# Patient Record
Sex: FEMALE | Race: WHITE | NOT HISPANIC OR LATINO | Employment: FULL TIME | URBAN - METROPOLITAN AREA
[De-identification: names, ages, dates, MRNs, and addresses within clinical notes are randomized per-mention and may not be internally consistent; named-entity substitution may affect disease eponyms.]

---

## 2017-03-02 DIAGNOSIS — I10 ESSENTIAL HYPERTENSION: Primary | ICD-10-CM

## 2017-03-02 DIAGNOSIS — R80.9 PROTEINURIA: ICD-10-CM

## 2017-03-02 DIAGNOSIS — N27.0 UNILATERAL SMALL KIDNEY: ICD-10-CM

## 2017-03-02 RX ORDER — AMLODIPINE BESYLATE 10 MG/1
10 TABLET ORAL DAILY
Qty: 30 TABLET | Refills: 6 | Status: SHIPPED | OUTPATIENT
Start: 2017-03-02 | End: 2018-05-15

## 2017-06-27 ENCOUNTER — TELEPHONE (OUTPATIENT)
Dept: NEPHROLOGY | Facility: CLINIC | Age: 22
End: 2017-06-27

## 2017-06-27 NOTE — TELEPHONE ENCOUNTER
Hello, this is Ruben's office from Medicine Specialty on the 3rd floor at [facility] located at [address] [place] [address]. We are reminding you of your upcoming Nephrology appointment on 7/11/2017 at 3:30 pm. Please arrive an hour prior to your appointment time for labs. Also, please bring an updated medication list including dose of prescribed and over the counter medications you are currently taking or your labeled medication bottles with you to your appointment. If you have any questions or would like to cancel or reschedule your appointment, please call us at [phone].     If you are having labs draw same day you need a lab appointment scheduled 1 hour prior to your visit.    You are welcome to have your labs done 2-7 days before your appointment at any [facility] or [facility] facility. If you have your labs completed before your appointment, please still come 30 minutes early for check-in.     Thank-You for allowing us to be a member of your Health Care Team,     Camilla Howard., CMA

## 2017-07-10 DIAGNOSIS — R80.9 PROTEINURIA: Primary | ICD-10-CM

## 2017-07-10 NOTE — NURSING NOTE
Labs per clinic 2A protocol.  Proteinuria  Last OV: peds to adult transition  Adali Méndez LPN  Nephrology  [facility]  [phone]

## 2017-07-11 ENCOUNTER — OFFICE VISIT (OUTPATIENT)
Dept: NEPHROLOGY | Facility: CLINIC | Age: 22
End: 2017-07-11
Attending: INTERNAL MEDICINE
Payer: COMMERCIAL

## 2017-07-11 VITALS
BODY MASS INDEX: 19.49 KG/M2 | HEIGHT: 67 IN | OXYGEN SATURATION: 97 % | DIASTOLIC BLOOD PRESSURE: 71 MMHG | WEIGHT: 124.2 LBS | SYSTOLIC BLOOD PRESSURE: 109 MMHG | HEART RATE: 77 BPM

## 2017-07-11 DIAGNOSIS — Q60.5 HYPOPLASTIC KIDNEY: Primary | ICD-10-CM

## 2017-07-11 DIAGNOSIS — R80.9 PROTEINURIA: ICD-10-CM

## 2017-07-11 DIAGNOSIS — I15.9 SECONDARY HYPERTENSION: ICD-10-CM

## 2017-07-11 LAB
ALBUMIN SERPL-MCNC: 4.1 G/DL (ref 3.4–5)
ALBUMIN UR-MCNC: NEGATIVE MG/DL
ANION GAP SERPL CALCULATED.3IONS-SCNC: 7 MMOL/L (ref 3–14)
APPEARANCE UR: CLEAR
BACTERIA #/AREA URNS HPF: ABNORMAL /HPF
BILIRUB UR QL STRIP: NEGATIVE
BUN SERPL-MCNC: 14 MG/DL (ref 7–30)
CALCIUM SERPL-MCNC: 8.4 MG/DL (ref 8.5–10.1)
CHLORIDE SERPL-SCNC: 108 MMOL/L (ref 94–109)
CO2 SERPL-SCNC: 24 MMOL/L (ref 20–32)
COLOR UR AUTO: YELLOW
CREAT SERPL-MCNC: 0.98 MG/DL (ref 0.52–1.04)
CREAT UR-MCNC: 123 MG/DL
ERYTHROCYTE [DISTWIDTH] IN BLOOD BY AUTOMATED COUNT: 12 % (ref 10–15)
GFR SERPL CREATININE-BSD FRML MDRD: 71 ML/MIN/1.7M2
GLUCOSE SERPL-MCNC: 102 MG/DL (ref 70–99)
GLUCOSE UR STRIP-MCNC: NEGATIVE MG/DL
HCT VFR BLD AUTO: 40.8 % (ref 35–47)
HGB BLD-MCNC: 13.3 G/DL (ref 11.7–15.7)
HGB UR QL STRIP: NEGATIVE
KETONES UR STRIP-MCNC: NEGATIVE MG/DL
LEUKOCYTE ESTERASE UR QL STRIP: NEGATIVE
MCH RBC QN AUTO: 29.5 PG (ref 26.5–33)
MCHC RBC AUTO-ENTMCNC: 32.6 G/DL (ref 31.5–36.5)
MCV RBC AUTO: 91 FL (ref 78–100)
MICROALBUMIN UR-MCNC: 65 MG/L
MICROALBUMIN/CREAT UR: 53.09 MG/G CR (ref 0–25)
MUCOUS THREADS #/AREA URNS LPF: PRESENT /LPF
NITRATE UR QL: NEGATIVE
PH UR STRIP: 6 PH (ref 5–7)
PHOSPHATE SERPL-MCNC: 2.4 MG/DL (ref 2.5–4.5)
PLATELET # BLD AUTO: 228 10E9/L (ref 150–450)
POTASSIUM SERPL-SCNC: 3.6 MMOL/L (ref 3.4–5.3)
PROT UR-MCNC: 0.13 G/L
PROT/CREAT 24H UR: 0.11 G/G CR (ref 0–0.2)
RBC # BLD AUTO: 4.51 10E12/L (ref 3.8–5.2)
RBC #/AREA URNS AUTO: <1 /HPF (ref 0–2)
SODIUM SERPL-SCNC: 139 MMOL/L (ref 133–144)
SP GR UR STRIP: 1.01 (ref 1–1.03)
SQUAMOUS #/AREA URNS AUTO: 1 /HPF (ref 0–1)
URN SPEC COLLECT METH UR: ABNORMAL
UROBILINOGEN UR STRIP-MCNC: 0 MG/DL (ref 0–2)
WBC # BLD AUTO: 9.5 10E9/L (ref 4–11)
WBC #/AREA URNS AUTO: 1 /HPF (ref 0–2)

## 2017-07-11 PROCEDURE — 99213 OFFICE O/P EST LOW 20 MIN: CPT | Mod: ZF

## 2017-07-11 PROCEDURE — 85027 COMPLETE CBC AUTOMATED: CPT | Performed by: INTERNAL MEDICINE

## 2017-07-11 PROCEDURE — 84156 ASSAY OF PROTEIN URINE: CPT | Performed by: INTERNAL MEDICINE

## 2017-07-11 PROCEDURE — 36415 COLL VENOUS BLD VENIPUNCTURE: CPT | Performed by: INTERNAL MEDICINE

## 2017-07-11 PROCEDURE — 82043 UR ALBUMIN QUANTITATIVE: CPT | Performed by: INTERNAL MEDICINE

## 2017-07-11 PROCEDURE — 80069 RENAL FUNCTION PANEL: CPT | Performed by: INTERNAL MEDICINE

## 2017-07-11 PROCEDURE — 81001 URINALYSIS AUTO W/SCOPE: CPT | Performed by: INTERNAL MEDICINE

## 2017-07-11 ASSESSMENT — PAIN SCALES - GENERAL: PAINLEVEL: NO PAIN (0)

## 2017-07-11 NOTE — LETTER
7/11/2017       RE: Charisma Valencia  [address]  [facility] [address]     Dear Colleague,    Thank you for referring your patient, Charisma Valencia, to the [facility] NEPHROLOGY at [facility]. Please see a copy of my visit note below.    Nephrology Clinic    Charisma Valencia MRN:[id number] YOB: 1995  Date of Service: July 11, 2017   Primary care provider: Edel Daily  Requesting physician: Lorenzo Houston     ASSESSMENT AND RECOMMENDATIONS:   1. 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  Her renal function is mildly reduced but stable with a baseline Cr of ~1 mg/dL with an eGFR of 71 ml/min. Of note, her iohexol in 2012 was 76 ml/min (when Cr was 0.9 mg/dL).  She has minimal albuminuria of 53 mg/g.    -no pressing need to start RAAS blockade  -will consider restarting enalapril in the future should she develop worsening albuminuria  -if enalapril restarted, will have to remain mindful that she is sexually active  -will consider surveillance renal ultrasound in 6-12 months  -RTC in 6 months    2. HTN: Likely related to CKD.  Per her report she has had 24 BP monitoring.  SBP currently at goal of <130.  -will continue amlodipine at 10 mg daily      REASON FOR CONSULT: CKD (hypoplastic/dysplastic right kidney)    HISTORY OF PRESENT ILLNESS:   Charisma Valencia is a 22 year old female who presents for f/u regarding CKD.  She has been previously followed by Dr. Lorenzo Houston in the in the Pediatric Renal Clinic and presents today to establish care in adult nephrology.  She has a history of long-standing asymptomatic non-nephrotic range isolated proteinuria, hypertension and hypoplastic/dysplastic right kidney.  Her last renal ultrasound (May, 2016) revealed a right kidney of 6.3 cm (with little growth by comparison) and left kidney 11.6 cm. She had been on enalapril in the past but was transitioned to amlodipine 10 mg daily.  She does not measure her blood pressure at home.  "Her proteinuria has been minimal despite being off enalapril.  She is not actively trying to become pregnant. Overall, she feels well and is without medical complaints.  She denies gross hematuria, significant edema, dysuria or urinary tract infection. She denies recent fever, headache, dizziness, blurry vision, hearing difficulty, earache, sore throat, cough, shortness of breath, palpitation, abdominal pain, vomiting, abnormal bowel movement, skin rashes, joint pain/swelling or muscle weakness. The remainder of comprehensive review of systems was unremarkable.     PAST MEDICAL HISTORY:  -CKD, stage 2: Due to hypoplastic/dysplastic right kidney  -HTN    PAST SURGICAL HISTORY:  No past surgical history on file.  MEDICATIONS:  Prescription Medications as of 7/16/2017             amLODIPine (NORVASC) 10 MG tablet Take 1 tablet (10 mg) by mouth daily         ALLERGIES:    No Known Allergies  REVIEW OF SYSTEMS:  A comprehensive review of systems was performed and found to be negative except as described here or above.  SOCIAL HISTORY:   Social History     Social History     Marital status: Single     Spouse name: N/A     Number of children: N/A     Years of education: N/A     Occupational History     Not on file.     Social History Main Topics     Smoking status: Never Smoker     Smokeless tobacco: Never Used     Alcohol use Not on file     Drug use: Not on file     Sexual activity: Not on file     Other Topics Concern     Not on file     Social History Narrative     FAMILY MEDICAL HISTORY:   Reviewed and noncontributory.     PHYSICAL EXAM:   /71  Pulse 77  Ht 1.702 m (5' 7\")  Wt 56.3 kg (124 lb 3.2 oz)  SpO2 97%  BMI 19.45 kg/m2  GENERAL APPEARANCE: alert and no distress  EYES: nonicteric  HENT: mouth without ulcers or lesions  NECK: supple, no adenopathy  RESP: lungs clear to auscultation   CV: regular rhythm, normal rate, no rub  ABDOMEN: soft, nontender, normal bowel sounds, no HSM   : No CVA " tenderness  Extremities: no edema  MS: no evidence of inflammation in joints, no muscle tenderness  SKIN: no concerning rash  NEURO: mentation intact and speech normal  PSYCH: affect normal/bright   LABS:   CMP  Recent Labs   Lab Test  07/11/17   1441 05/26/16   1511  08/14/14   0950  06/05/14   0945   NA  139  138  138  141   POTASSIUM  3.6  3.7  3.8  4.0   CHLORIDE  108  106  105  105   CO2  24  25  23  24   ANIONGAP  7  8  10  12   GLC  102*  108*  87  100*   BUN  14  16  19  18   CR  0.98  0.82  0.89  0.86   GFRESTIMATED  71  88  81  86   GFRESTBLACK  86  >90   GFR Calc    >90   GFR Calc    >90   MERCY  8.4*  9.4  9.6  9.7   MAG   --   2.0   --    --    PHOS  2.4*  3.0  2.6  2.9   ALBUMIN  4.1  4.3  4.8  4.5   ALKPHOS   --   80   --    --      CBC  Recent Labs   Lab Test  07/11/17   1441 05/26/16   1511  06/20/13   0930  12/06/12   0855   HGB  13.3  13.7  14.4  14.3   WBC  9.5  8.9  7.7  6.7   RBC  4.51  4.63  4.73  4.65   HCT  40.8  42.2  43.2  42.0   MCV  91  91  91  90   MCH  29.5  29.6  30.4  30.8   MCHC  32.6  32.5  33.3  34.0   RDW  12.0  12.0  12.9  12.1   PLT  228  236  222  220     URINE STUDIES  Recent Labs   Lab Test  07/11/17   1450  05/26/16   1511  08/14/14   0950  06/17/14   1153   COLOR  Yellow  Light Yellow  Straw  Light Yellow   APPEARANCE  Clear  Clear  Clear  Clear   URINEGLC  Negative  Negative  Negative  Negative   URINEBILI  Negative  Negative  Negative  Negative   URINEKETONE  Negative  Negative  Negative  Negative   SG  1.014  1.014  1.007  1.019   UBLD  Negative  Negative  Negative  Negative   URINEPH  6.0  6.0  5.5  6.5   PROTEIN  Negative  Negative  Negative  Negative   NITRITE  Negative  Negative  Negative  Negative   LEUKEST  Negative  Small*  Negative  Negative   RBCU  <1  0  0  1   WBCU  1  <1  1  1     Recent Labs   Lab Test  07/11/17   1450  05/26/16   1511 08/14/14   0950  06/17/14   1153  07/12/13   0850  06/20/13   0930  06/20/13   0615   12   0845  12   0645  10/13/11   1040  09   0730  09   1200   UTPG  0.11  0.07  0.11  0.18  <0.03  0.39*  0.21*  <0.04  0.16  <0.02  0.31*  0.94*  1.88*     PTH  Recent Labs   Lab Test  16   1511   PTHI  21     IRON STUDIES  No lab results found.  IMAGIN2016 Renal U/S:  Right kidney 6.3 cm and left kidney 11.6 cm.      Total time spent was >60 minutes, and more than 50% of face to face time was spent in counseling and/or coordination of care regarding principles of multidisciplinary care, medication management, and chronic kidney disease and HTN education.  Migel Major MD

## 2017-07-11 NOTE — MR AVS SNAPSHOT
After Visit Summary   7/11/2017    Charisma Valencia    MRN: [id number]           Patient Information     Date Of Birth          1995        Visit Information        Provider Department      7/11/2017 3:30 PM Migel Major MD [facility] Nephrology         Follow-ups after your visit        Follow-up notes from your care team     Return in 6 months (on 1/11/2018).      Your next 10 appointments already scheduled     Jan 09, 2018  1:30 PM CST   Lab with  LAB   [facility] ([facility])    [address] [place] [id number] [phone]            Jan 09, 2018  2:30 PM CST   (Arrive by 2:00 PM)   Return Visit with Migel Major MD   [facility] Nephrology ([facility])    [address] [place] [id number] [phone]              Who to contact     If you have questions or need follow up information about today's clinic visit or your schedule please contact [facility] NEPHROLOGY directly at [phone].  Normal or non-critical lab and imaging results will be communicated to you by MyChart, letter or phone within 4 business days after the clinic has received the results. If you do not hear from us within 7 days, please contact the clinic through [email or web address] or phone. If you have a critical or abnormal lab result, we will notify you by phone as soon as possible.  Submit refill requests through [employer] or call your pharmacy and they will forward the refill request to us. Please allow 3 business days for your refill to be completed.          Additional Information About Your Visit        MyChart Information     [employer] gives you secure access to your electronic health record. If you see a primary care provider, you can also send messages to your care team and make appointments. If you have questions, please call your primary care clinic.  If you do not have a primary care provider, please call  "[phone] and they will assist you.        Care EveryWhere ID     This is your Care EveryWhere ID. This could be used by other organizations to access your [facility] medical records  [id number]        Your Vitals Were     Pulse Height Pulse Oximetry BMI (Body Mass Index)          77 1.702 m (5' 7\") 97% 19.45 kg/m2         Blood Pressure from Last 3 Encounters:   07/11/17 109/71   08/04/16 112/77   07/06/16 115/69    Weight from Last 3 Encounters:   07/11/17 56.3 kg (124 lb 3.2 oz)   08/04/16 54.5 kg (120 lb 2.4 oz)   05/26/16 56.5 kg (124 lb 9 oz)              Today, you had the following     No orders found for display       Primary Care Provider Office Phone # Fax #    Edel Daily [phone]       [place] [id number] [address] [address]        Equal Access to Services     ANDREI HILTON : Hadii aad ku hadasho Soomaali, waaxda luqadaha, qaybta kaalmada adeegyada, waxay idiin hayaan grace silva . So [facility] [phone].    ATENCIÓN: Si habla español, tiene a heredia disposición servicios gratuitos de asistencia lingüística. Llame al [phone].    We comply with applicable federal civil rights laws and [place] laws. We do not discriminate on the basis of race, color, national origin, age, disability sex, sexual orientation or gender identity.            Thank you!     Thank you for choosing [facility] NEPHROLOGY  for your care. Our goal is always to provide you with excellent care. Hearing back from our patients is one way we can continue to improve our services. Please take a few minutes to complete the written survey that you may receive in the mail after your visit with us. Thank you!             Your Updated Medication List - Protect others around you: Learn how to safely use, store and throw away your medicines at www.disposemymeds.org.          This list is accurate as of: 7/11/17  4:54 PM.  Always use your most recent med list.                   " Brand Name Dispense Instructions for use Diagnosis    amLODIPine 10 MG tablet    NORVASC    30 tablet    Take 1 tablet (10 mg) by mouth daily    Essential hypertension, Proteinuria, Unilateral small kidney

## 2017-07-11 NOTE — NURSING NOTE
"Chief Complaint   Patient presents with     Consult     Consult for Proteinuria       Initial /71  Pulse 77  Ht 1.702 m (5' 7\")  Wt 56.3 kg (124 lb 3.2 oz)  SpO2 97%  BMI 19.45 kg/m2 Estimated body mass index is 19.45 kg/(m^2) as calculated from the following:    Height as of this encounter: 1.702 m (5' 7\").    Weight as of this encounter: 56.3 kg (124 lb 3.2 oz).  Medication Reconciliation: complete   Silvia Zazueta CMA    "

## 2017-07-16 PROBLEM — Q60.5 HYPOPLASTIC KIDNEY: Status: ACTIVE | Noted: 2017-07-16

## 2017-07-16 PROBLEM — N18.2 HYPERTENSION ASSOCIATED WITH STAGE 2 CHRONIC KIDNEY DISEASE DUE TO TYPE 2 DIABETES MELLITUS (H): Status: ACTIVE | Noted: 2017-07-16

## 2017-07-16 PROBLEM — I15.9 SECONDARY HYPERTENSION: Status: ACTIVE | Noted: 2017-07-16

## 2017-07-17 NOTE — PROGRESS NOTES
Nephrology Clinic    Charisma Valencia MRN:[id number] YOB: 1995  Date of Service: July 11, 2017   Primary care provider: Edel Daily  Requesting physician: Lorenzo Houston     ASSESSMENT AND RECOMMENDATIONS:   1. 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  Her renal function is mildly reduced but stable with a baseline Cr of ~1 mg/dL with an eGFR of 71 ml/min. Of note, her iohexol in 2012 was 76 ml/min (when Cr was 0.9 mg/dL).  She has minimal albuminuria of 53 mg/g.    -no pressing need to start RAAS blockade  -will consider restarting enalapril in the future should she develop worsening albuminuria  -if enalapril restarted, will have to remain mindful that she is sexually active  -will consider surveillance renal ultrasound in 6-12 months  -RTC in 6 months    2. HTN: Likely related to CKD.  Per her report she has had 24 BP monitoring.  SBP currently at goal of <130.  -will continue amlodipine at 10 mg daily      REASON FOR CONSULT: CKD (hypoplastic/dysplastic right kidney)    HISTORY OF PRESENT ILLNESS:   Charisma Valencia is a 22 year old female who presents for f/u regarding CKD.  She has been previously followed by Dr. Lorenzo Houston in the in the Pediatric Renal Clinic and presents today to establish care in adult nephrology.  She has a history of long-standing asymptomatic non-nephrotic range isolated proteinuria, hypertension and hypoplastic/dysplastic right kidney.  Her last renal ultrasound (May, 2016) revealed a right kidney of 6.3 cm (with little growth by comparison) and left kidney 11.6 cm. She had been on enalapril in the past but was transitioned to amlodipine 10 mg daily.  She does not measure her blood pressure at home. Her proteinuria has been minimal despite being off enalapril.  She is not actively trying to become pregnant. Overall, she feels well and is without medical complaints.  She denies gross hematuria, significant edema, dysuria or urinary tract infection. She denies  "recent fever, headache, dizziness, blurry vision, hearing difficulty, earache, sore throat, cough, shortness of breath, palpitation, abdominal pain, vomiting, abnormal bowel movement, skin rashes, joint pain/swelling or muscle weakness. The remainder of comprehensive review of systems was unremarkable.     PAST MEDICAL HISTORY:  -CKD, stage 2: Due to hypoplastic/dysplastic right kidney  -HTN    PAST SURGICAL HISTORY:  No past surgical history on file.  MEDICATIONS:  Prescription Medications as of 7/16/2017             amLODIPine (NORVASC) 10 MG tablet Take 1 tablet (10 mg) by mouth daily         ALLERGIES:    No Known Allergies  REVIEW OF SYSTEMS:  A comprehensive review of systems was performed and found to be negative except as described here or above.  SOCIAL HISTORY:   Social History     Social History     Marital status: Single     Spouse name: N/A     Number of children: N/A     Years of education: N/A     Occupational History     Not on file.     Social History Main Topics     Smoking status: Never Smoker     Smokeless tobacco: Never Used     Alcohol use Not on file     Drug use: Not on file     Sexual activity: Not on file     Other Topics Concern     Not on file     Social History Narrative     FAMILY MEDICAL HISTORY:   Reviewed and noncontributory.     PHYSICAL EXAM:   /71  Pulse 77  Ht 1.702 m (5' 7\")  Wt 56.3 kg (124 lb 3.2 oz)  SpO2 97%  BMI 19.45 kg/m2  GENERAL APPEARANCE: alert and no distress  EYES: nonicteric  HENT: mouth without ulcers or lesions  NECK: supple, no adenopathy  RESP: lungs clear to auscultation   CV: regular rhythm, normal rate, no rub  ABDOMEN: soft, nontender, normal bowel sounds, no HSM   : No CVA tenderness  Extremities: no edema  MS: no evidence of inflammation in joints, no muscle tenderness  SKIN: no concerning rash  NEURO: mentation intact and speech normal  PSYCH: affect normal/bright   LABS:   CMP  Recent Labs   Lab Test  07/11/17   1441  05/26/16   1511  " 08/14/14   0950  06/05/14   0945   NA  139  138  138  141   POTASSIUM  3.6  3.7  3.8  4.0   CHLORIDE  108  106  105  105   CO2  24  25  23  24   ANIONGAP  7  8  10  12   GLC  102*  108*  87  100*   BUN  14  16  19  18   CR  0.98  0.82  0.89  0.86   GFRESTIMATED  71  88  81  86   GFRESTBLACK  86  >90   GFR Calc    >90   GFR Calc    >90   MERCY  8.4*  9.4  9.6  9.7   MAG   --   2.0   --    --    PHOS  2.4*  3.0  2.6  2.9   ALBUMIN  4.1  4.3  4.8  4.5   ALKPHOS   --   80   --    --      CBC  Recent Labs   Lab Test  07/11/17   1441  05/26/16   1511  06/20/13   0930  12/06/12   0855   HGB  13.3  13.7  14.4  14.3   WBC  9.5  8.9  7.7  6.7   RBC  4.51  4.63  4.73  4.65   HCT  40.8  42.2  43.2  42.0   MCV  91  91  91  90   MCH  29.5  29.6  30.4  30.8   MCHC  32.6  32.5  33.3  34.0   RDW  12.0  12.0  12.9  12.1   PLT  228  236  222  220     URINE STUDIES  Recent Labs   Lab Test  07/11/17   1450  05/26/16   1511  08/14/14   0950  06/17/14   1153   COLOR  Yellow  Light Yellow  Straw  Light Yellow   APPEARANCE  Clear  Clear  Clear  Clear   URINEGLC  Negative  Negative  Negative  Negative   URINEBILI  Negative  Negative  Negative  Negative   URINEKETONE  Negative  Negative  Negative  Negative   SG  1.014  1.014  1.007  1.019   UBLD  Negative  Negative  Negative  Negative   URINEPH  6.0  6.0  5.5  6.5   PROTEIN  Negative  Negative  Negative  Negative   NITRITE  Negative  Negative  Negative  Negative   LEUKEST  Negative  Small*  Negative  Negative   RBCU  <1  0  0  1   WBCU  1  <1  1  1     Recent Labs   Lab Test  07/11/17   1450  05/26/16   1511  08/14/14   0950  06/17/14   1153  07/12/13   0850  06/20/13   0930  06/20/13   0615  07/13/12   0845  07/13/12   0645  10/13/11   1040  07/02/09   0730  06/18/09   1200   UTPG  0.11  0.07  0.11  0.18  <0.03  0.39*  0.21*  <0.04  0.16  <0.02  0.31*  0.94*  1.88*     PTH  Recent Labs   Lab Test  05/26/16   1511   PTHI  21     IRON STUDIES  No lab results  found.  IMAGIN2016 Renal U/S:  Right kidney 6.3 cm and left kidney 11.6 cm.      Total time spent was >60 minutes, and more than 50% of face to face time was spent in counseling and/or coordination of care regarding principles of multidisciplinary care, medication management, and chronic kidney disease and HTN education.  Migel Major MD

## 2017-07-29 ENCOUNTER — HEALTH MAINTENANCE LETTER (OUTPATIENT)
Age: 22
End: 2017-07-29

## 2018-01-03 DIAGNOSIS — Q60.5 HYPOPLASTIC KIDNEY: ICD-10-CM

## 2018-01-03 DIAGNOSIS — R80.9 PROTEINURIA: ICD-10-CM

## 2018-01-03 DIAGNOSIS — I15.9 SECONDARY HYPERTENSION: Primary | ICD-10-CM

## 2018-01-10 ENCOUNTER — OFFICE VISIT (OUTPATIENT)
Dept: NEPHROLOGY | Facility: CLINIC | Age: 23
End: 2018-01-10
Attending: INTERNAL MEDICINE
Payer: COMMERCIAL

## 2018-01-10 VITALS
OXYGEN SATURATION: 96 % | HEART RATE: 79 BPM | BODY MASS INDEX: 18.93 KG/M2 | HEIGHT: 67 IN | SYSTOLIC BLOOD PRESSURE: 107 MMHG | WEIGHT: 120.6 LBS | DIASTOLIC BLOOD PRESSURE: 72 MMHG

## 2018-01-10 DIAGNOSIS — R80.8 OTHER PROTEINURIA: ICD-10-CM

## 2018-01-10 DIAGNOSIS — N18.2 CKD (CHRONIC KIDNEY DISEASE) STAGE 2, GFR 60-89 ML/MIN: Primary | ICD-10-CM

## 2018-01-10 DIAGNOSIS — I15.9 SECONDARY HYPERTENSION: ICD-10-CM

## 2018-01-10 DIAGNOSIS — R80.9 PROTEINURIA: ICD-10-CM

## 2018-01-10 DIAGNOSIS — Q60.5 HYPOPLASTIC KIDNEY: ICD-10-CM

## 2018-01-10 LAB
ALBUMIN SERPL-MCNC: 4.3 G/DL (ref 3.4–5)
ALBUMIN UR-MCNC: NEGATIVE MG/DL
ANION GAP SERPL CALCULATED.3IONS-SCNC: 9 MMOL/L (ref 3–14)
APPEARANCE UR: CLEAR
BACTERIA #/AREA URNS HPF: ABNORMAL /HPF
BILIRUB UR QL STRIP: NEGATIVE
BUN SERPL-MCNC: 16 MG/DL (ref 7–30)
CALCIUM SERPL-MCNC: 8.5 MG/DL (ref 8.5–10.1)
CHLORIDE SERPL-SCNC: 106 MMOL/L (ref 94–109)
CO2 SERPL-SCNC: 22 MMOL/L (ref 20–32)
COLOR UR AUTO: YELLOW
CREAT SERPL-MCNC: 0.91 MG/DL (ref 0.52–1.04)
CREAT UR-MCNC: 68 MG/DL
DEPRECATED CALCIDIOL+CALCIFEROL SERPL-MC: 26 UG/L (ref 20–75)
GFR SERPL CREATININE-BSD FRML MDRD: 77 ML/MIN/1.7M2
GLUCOSE SERPL-MCNC: 98 MG/DL (ref 70–99)
GLUCOSE UR STRIP-MCNC: NEGATIVE MG/DL
HGB BLD-MCNC: 13.7 G/DL (ref 11.7–15.7)
HGB UR QL STRIP: NEGATIVE
KETONES UR STRIP-MCNC: NEGATIVE MG/DL
LEUKOCYTE ESTERASE UR QL STRIP: NEGATIVE
MICROALBUMIN UR-MCNC: 34 MG/L
MICROALBUMIN/CREAT UR: 49.19 MG/G CR (ref 0–25)
MUCOUS THREADS #/AREA URNS LPF: PRESENT /LPF
NITRATE UR QL: NEGATIVE
PH UR STRIP: 5 PH (ref 5–7)
PHOSPHATE SERPL-MCNC: 2.6 MG/DL (ref 2.5–4.5)
POTASSIUM SERPL-SCNC: 3.8 MMOL/L (ref 3.4–5.3)
PROT UR-MCNC: 0.08 G/L
PROT/CREAT 24H UR: 0.11 G/G CR (ref 0–0.2)
PTH-INTACT SERPL-MCNC: 28 PG/ML (ref 12–72)
RBC #/AREA URNS AUTO: 2 /HPF (ref 0–2)
SODIUM SERPL-SCNC: 136 MMOL/L (ref 133–144)
SOURCE: ABNORMAL
SP GR UR STRIP: 1.01 (ref 1–1.03)
SQUAMOUS #/AREA URNS AUTO: <1 /HPF (ref 0–1)
UROBILINOGEN UR STRIP-MCNC: 0 MG/DL (ref 0–2)
WBC #/AREA URNS AUTO: 1 /HPF (ref 0–2)

## 2018-01-10 PROCEDURE — 80069 RENAL FUNCTION PANEL: CPT | Performed by: INTERNAL MEDICINE

## 2018-01-10 PROCEDURE — 82043 UR ALBUMIN QUANTITATIVE: CPT | Performed by: INTERNAL MEDICINE

## 2018-01-10 PROCEDURE — 82306 VITAMIN D 25 HYDROXY: CPT | Performed by: INTERNAL MEDICINE

## 2018-01-10 PROCEDURE — 83970 ASSAY OF PARATHORMONE: CPT | Performed by: INTERNAL MEDICINE

## 2018-01-10 PROCEDURE — 81001 URINALYSIS AUTO W/SCOPE: CPT | Performed by: INTERNAL MEDICINE

## 2018-01-10 PROCEDURE — 36415 COLL VENOUS BLD VENIPUNCTURE: CPT | Performed by: INTERNAL MEDICINE

## 2018-01-10 PROCEDURE — 84156 ASSAY OF PROTEIN URINE: CPT | Performed by: INTERNAL MEDICINE

## 2018-01-10 PROCEDURE — G0463 HOSPITAL OUTPT CLINIC VISIT: HCPCS | Mod: ZF

## 2018-01-10 PROCEDURE — 85018 HEMOGLOBIN: CPT | Performed by: INTERNAL MEDICINE

## 2018-01-10 ASSESSMENT — PAIN SCALES - GENERAL: PAINLEVEL: NO PAIN (0)

## 2018-01-10 NOTE — LETTER
1/10/2018      RE: Charisma Valencia  [address]  [place] [facility] [address]       Nephrology Clinic    Charisma Valencia MRN:[id number] YOB: 1995  Date of Service: January 10, 2018    Primary care provider: Edel Daily  Requesting physician: Lorenzo Houston     ASSESSMENT AND RECOMMENDATIONS:   1. 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  Her renal function is mildly reduced but stable with a baseline Cr of ~0.9 mg/dL with an eGFR of 77 ml/min.  Of note, her iohexol in 2012 was 76 ml/min (when Cr was 0.9 mg/dL) suggesting that creatinine-based eGFR is fairly accurate.  She has minimal albuminuria of ~40 mg/g.    -no pressing need to start RAAS blockade  -will consider restarting enalapril in the future should she develop worsening albuminuria  -if enalapril restarted, will have to remain mindful that she is sexually active  -will consider surveillance renal ultrasound in 1-2 years  -otherwise, RTC in 8 months    2. HTN: Likely related to CKD.  Per her report she has had 24 BP monitoring.  SBP currently at goal of <130.  -will continue amlodipine at 10 mg daily      REASON FOR CONSULT: CKD (hypoplastic/dysplastic right kidney)    HISTORY OF PRESENT ILLNESS:   Charisma Valencia is a 22 year old woman who presents for f/u regarding CKD.  She has been previously followed by Dr. Lorenzo Houston in the [facility].  She has a history of long-standing asymptomatic non-nephrotic range isolated proteinuria, hypertension and hypoplastic/dysplastic right kidney.  Her last renal ultrasound (May, 2016) revealed a right kidney of 6.3 cm (with little growth by comparison) and left kidney 11.6 cm. She had been on enalapril in the past but was transitioned to amlodipine 10 mg daily.  She does not measure her blood pressure at home. Her proteinuria has been minimal despite being off enalapril.  She is not actively trying to become pregnant. Overall, she feels well and is without medical complaints.  She  "denies gross hematuria, significant edema, dysuria or urinary tract infection. She denies recent fever, headache, dizziness, blurry vision, hearing difficulty, earache, sore throat, cough, shortness of breath, palpitation, abdominal pain, vomiting, abnormal bowel movement, skin rashes, joint pain/swelling or muscle weakness. The remainder of comprehensive review of systems was unremarkable.  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      PAST MEDICAL HISTORY:  -CKD, stage 2: Due to hypoplastic/dysplastic right kidney  -HTN    PAST SURGICAL HISTORY:  No past surgical history on file.  MEDICATIONS:  Prescription Medications as of 1/10/2018             amLODIPine (NORVASC) 10 MG tablet Take 1 tablet (10 mg) by mouth daily         ALLERGIES:    No Known Allergies  REVIEW OF SYSTEMS:  A comprehensive review of systems was performed and found to be negative except as described here or above.  SOCIAL HISTORY:   Social History     Social History     Marital status: Single     Spouse name: N/A     Number of children: N/A     Years of education: N/A     Occupational History     Not on file.     Social History Main Topics     Smoking status: Never Smoker     Smokeless tobacco: Never Used     Alcohol use Not on file     Drug use: Not on file     Sexual activity: Not on file     Other Topics Concern     Not on file     Social History Narrative     FAMILY MEDICAL HISTORY:   Reviewed and noncontributory.     PHYSICAL EXAM:   /72  Pulse 79  Ht 1.702 m (5' 7\")  Wt 54.7 kg (120 lb 9.6 oz)  SpO2 96%  BMI 18.89 kg/m2  GENERAL APPEARANCE: alert and no distress  EYES: nonicteric  HENT: mouth without ulcers or lesions  NECK: supple, no adenopathy  RESP: lungs clear to auscultation   CV: regular rhythm, normal rate, no rub  ABDOMEN: soft, nontender, normal bowel sounds, no HSM   : No CVA tenderness  Extremities: no edema  MS: no evidence of inflammation in joints, no muscle tenderness  SKIN: " no concerning rash  NEURO: mentation intact and speech normal  PSYCH: affect normal/bright   LABS:   CMP  Recent Labs   Lab Test  01/10/18   1258  07/11/17   1441  05/26/16   1511  08/14/14   0950   NA  136  139  138  138   POTASSIUM  3.8  3.6  3.7  3.8   CHLORIDE  106  108  106  105   CO2  22  24  25  23   ANIONGAP  9  7  8  10   GLC  98  102*  108*  87   BUN  16  14  16  19   CR  0.91  0.98  0.82  0.89   GFRESTIMATED  77  71  88  81   GFRESTBLACK  >90  86  >90   GFR Calc    >90   GFR Calc     MERCY  8.5  8.4*  9.4  9.6   MAG   --    --   2.0   --    PHOS  2.6  2.4*  3.0  2.6   ALBUMIN  4.3  4.1  4.3  4.8   ALKPHOS   --    --   80   --      CBC  Recent Labs   Lab Test  01/10/18   1258  07/11/17   1441  05/26/16   1511  06/20/13   0930  12/06/12   0855   HGB  13.7  13.3  13.7  14.4  14.3   WBC   --   9.5  8.9  7.7  6.7   RBC   --   4.51  4.63  4.73  4.65   HCT   --   40.8  42.2  43.2  42.0   MCV   --   91  91  91  90   MCH   --   29.5  29.6  30.4  30.8   MCHC   --   32.6  32.5  33.3  34.0   RDW   --   12.0  12.0  12.9  12.1   PLT   --   228  236  222  220     URINE STUDIES  Recent Labs   Lab Test  01/10/18   1313  07/11/17   1450  05/26/16   1511  08/14/14   0950   COLOR  Yellow  Yellow  Light Yellow  Straw   APPEARANCE  Clear  Clear  Clear  Clear   URINEGLC  Negative  Negative  Negative  Negative   URINEBILI  Negative  Negative  Negative  Negative   URINEKETONE  Negative  Negative  Negative  Negative   SG  1.009  1.014  1.014  1.007   UBLD  Negative  Negative  Negative  Negative   URINEPH  5.0  6.0  6.0  5.5   PROTEIN  Negative  Negative  Negative  Negative   NITRITE  Negative  Negative  Negative  Negative   LEUKEST  Negative  Negative  Small*  Negative   RBCU  2  <1  0  0   WBCU  1  1  <1  1     Recent Labs   Lab Test  01/10/18   1313  07/11/17   1450  05/26/16   1511  08/14/14   0950  06/17/14   1153  07/12/13   0850  06/20/13   0930  06/20/13   0615  07/13/12   0845  07/13/12   0645   10/13/11   1040   UTPG  0.11  0.11  0.07  0.11  0.18  <0.03  0.39*  0.21*  <0.04  0.16  <0.02  0.31*     PTH  Recent Labs   Lab Test  16   1511   PTHI  21     IRON STUDIES  No lab results found.  IMAGIN2016 Renal U/S:  Right kidney 6.3 cm and left kidney 11.6 cm.      Total time spent was >40 minutes, and more than 50% of face to face time was spent in counseling and/or coordination of care regarding principles of multidisciplinary care, medication management, and chronic kidney disease and HTN education.  Migel Major MD

## 2018-01-10 NOTE — MR AVS SNAPSHOT
After Visit Summary   1/10/2018    Charisma Valencia    MRN: [id number]           Patient Information     Date Of Birth          1995        Visit Information        Provider Department      1/10/2018 2:00 PM Migel Major MD [facility]         Follow-ups after your visit        Follow-up notes from your care team     Return in about 8 months (around 9/10/2018).      Your next 10 appointments already scheduled     Sep 05, 2018  1:00 PM CDT   Lab with  LAB   [facility] ([facility])    [address]  1st Floor  [facility] [id number] [phone]            Sep 05, 2018  2:00 PM CDT   (Arrive by 1:30 PM)   Return Visit with Migel Major MD   [facility] ([facility])    [address] [place] [address]  [address]  [facility] [address] [phone]              Who to contact     If you have questions or need follow up information about today's clinic visit or your schedule please contact [facility] directly at [phone].  Normal or non-critical lab and imaging results will be communicated to you by [employer], letter or phone within 4 business days after the clinic has received the results. If you do not hear from us within 7 days, please contact the clinic through [employer] or phone. If you have a critical or abnormal lab result, we will notify you by phone as soon as possible.  Submit refill requests through [employer] or call your pharmacy and they will forward the refill request to us. Please allow 3 business days for your refill to be completed.          Additional Information About Your Visit        MyChart Information     [employer] gives you secure access to your electronic health record. If you see a primary care provider, you can also send messages to your care team and make appointments. If you have questions, please call your primary care clinic.  If you do not have a primary care provider,  "please call [phone] and they will assist you.        Care EveryWhere ID     This is your Care EveryWhere ID. This could be used by other organizations to access your [facility] medical records  [id number]        Your Vitals Were     Pulse Height Pulse Oximetry BMI (Body Mass Index)          79 1.702 m (5' 7\") 96% 18.89 kg/m2         Blood Pressure from Last 3 Encounters:   01/10/18 107/72   07/11/17 109/71   08/04/16 112/77    Weight from Last 3 Encounters:   01/10/18 54.7 kg (120 lb 9.6 oz)   07/11/17 56.3 kg (124 lb 3.2 oz)   08/04/16 54.5 kg (120 lb 2.4 oz)              Today, you had the following     No orders found for display       Primary Care Provider Office Phone # Fax #    Edel Daily [phone]       [address] [address]        Equal Access to Services     [facility]: Hadii aad ku hadasho Soomaali, waaxda luqadaha, qaybta kaalmada adeegyada, waxay idiin hayritesh silva . So [facility] [phone].    ATENCIÓN: Si habla español, tiene a heredia disposición servicios gratuitos de asistencia lingüística. Llame al [phone].    We comply with applicable federal civil rights laws and [place] laws. We do not discriminate on the basis of race, color, national origin, age, disability, sex, sexual orientation, or gender identity.            Thank you!     Thank you for choosing [facility]  for your care. Our goal is always to provide you with excellent care. Hearing back from our patients is one way we can continue to improve our services. Please take a few minutes to complete the written survey that you may receive in the mail after your visit with us. Thank you!             Your Updated Medication List - Protect others around you: Learn how to safely use, store and throw away your medicines at www.disposemymeds.org.          This list is accurate as of: 1/10/18  3:01 PM.  Always use your most recent med list.       "             Brand Name Dispense Instructions for use Diagnosis    amLODIPine 10 MG tablet    NORVASC    30 tablet    Take 1 tablet (10 mg) by mouth daily    Essential hypertension, Proteinuria, Unilateral small kidney

## 2018-01-10 NOTE — PROGRESS NOTES
Nephrology Clinic    Charisma Valencia MRN:[id number] YOB: 1995  Date of Service: January 10, 2018    Primary care provider: Edel Daily  Requesting physician: Lorenzo Houston     ASSESSMENT AND RECOMMENDATIONS:   1. 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  Her renal function is mildly reduced but stable with a baseline Cr of ~0.9 mg/dL with an eGFR of 77 ml/min.  Of note, her iohexol in 2012 was 76 ml/min (when Cr was 0.9 mg/dL) suggesting that creatinine-based eGFR is fairly accurate.  She has minimal albuminuria of ~40 mg/g.    -no pressing need to start RAAS blockade  -will consider restarting enalapril in the future should she develop worsening albuminuria  -if enalapril restarted, will have to remain mindful that she is sexually active  -will consider surveillance renal ultrasound in 1-2 years  -otherwise, RTC in 8 months    2. HTN: Likely related to CKD.  Per her report she has had 24 BP monitoring.  SBP currently at goal of <130.  -will continue amlodipine at 10 mg daily      REASON FOR CONSULT: CKD (hypoplastic/dysplastic right kidney)    HISTORY OF PRESENT ILLNESS:   Charisma Valencia is a 22 year old woman who presents for f/u regarding CKD.  She has been previously followed by Dr. Lorenzo Houston in the in the [facility].  She has a history of long-standing asymptomatic non-nephrotic range isolated proteinuria, hypertension and hypoplastic/dysplastic right kidney.  Her last renal ultrasound (May, 2016) revealed a right kidney of 6.3 cm (with little growth by comparison) and left kidney 11.6 cm. She had been on enalapril in the past but was transitioned to amlodipine 10 mg daily.  She does not measure her blood pressure at home. Her proteinuria has been minimal despite being off enalapril.  She is not actively trying to become pregnant. Overall, she feels well and is without medical complaints.  She denies gross hematuria, significant edema, dysuria or urinary tract infection.  "She denies recent fever, headache, dizziness, blurry vision, hearing difficulty, earache, sore throat, cough, shortness of breath, palpitation, abdominal pain, vomiting, abnormal bowel movement, skin rashes, joint pain/swelling or muscle weakness. The remainder of comprehensive review of systems was unremarkable.  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      PAST MEDICAL HISTORY:  -CKD, stage 2: Due to hypoplastic/dysplastic right kidney  -HTN    PAST SURGICAL HISTORY:  No past surgical history on file.  MEDICATIONS:  Prescription Medications as of 1/10/2018             amLODIPine (NORVASC) 10 MG tablet Take 1 tablet (10 mg) by mouth daily         ALLERGIES:    No Known Allergies  REVIEW OF SYSTEMS:  A comprehensive review of systems was performed and found to be negative except as described here or above.  SOCIAL HISTORY:   Social History     Social History     Marital status: Single     Spouse name: N/A     Number of children: N/A     Years of education: N/A     Occupational History     Not on file.     Social History Main Topics     Smoking status: Never Smoker     Smokeless tobacco: Never Used     Alcohol use Not on file     Drug use: Not on file     Sexual activity: Not on file     Other Topics Concern     Not on file     Social History Narrative     FAMILY MEDICAL HISTORY:   Reviewed and noncontributory.     PHYSICAL EXAM:   /72  Pulse 79  Ht 1.702 m (5' 7\")  Wt 54.7 kg (120 lb 9.6 oz)  SpO2 96%  BMI 18.89 kg/m2  GENERAL APPEARANCE: alert and no distress  EYES: nonicteric  HENT: mouth without ulcers or lesions  NECK: supple, no adenopathy  RESP: lungs clear to auscultation   CV: regular rhythm, normal rate, no rub  ABDOMEN: soft, nontender, normal bowel sounds, no HSM   : No CVA tenderness  Extremities: no edema  MS: no evidence of inflammation in joints, no muscle tenderness  SKIN: no concerning rash  NEURO: mentation intact and speech normal  PSYCH: affect " normal/bright   LABS:   CMP  Recent Labs   Lab Test  01/10/18   1258  07/11/17   1441 05/26/16   1511 08/14/14   0950   NA  136  139  138  138   POTASSIUM  3.8  3.6  3.7  3.8   CHLORIDE  106  108  106  105   CO2  22  24  25  23   ANIONGAP  9  7  8  10   GLC  98  102*  108*  87   BUN  16  14  16  19   CR  0.91  0.98  0.82  0.89   GFRESTIMATED  77  71  88  81   GFRESTBLACK  >90  86  >90   GFR Calc    >90   GFR Calc     MERCY  8.5  8.4*  9.4  9.6   MAG   --    --   2.0   --    PHOS  2.6  2.4*  3.0  2.6   ALBUMIN  4.3  4.1  4.3  4.8   ALKPHOS   --    --   80   --      CBC  Recent Labs   Lab Test  01/10/18   1258  07/11/17   1441 05/26/16   1511 06/20/13   0930  12/06/12   0855   HGB  13.7  13.3  13.7  14.4  14.3   WBC   --   9.5  8.9  7.7  6.7   RBC   --   4.51  4.63  4.73  4.65   HCT   --   40.8  42.2  43.2  42.0   MCV   --   91  91  91  90   MCH   --   29.5  29.6  30.4  30.8   MCHC   --   32.6  32.5  33.3  34.0   RDW   --   12.0  12.0  12.9  12.1   PLT   --   228  236  222  220     URINE STUDIES  Recent Labs   Lab Test  01/10/18   1313  07/11/17   1450 05/26/16   1511  08/14/14   0950   COLOR  Yellow  Yellow  Light Yellow  Straw   APPEARANCE  Clear  Clear  Clear  Clear   URINEGLC  Negative  Negative  Negative  Negative   URINEBILI  Negative  Negative  Negative  Negative   URINEKETONE  Negative  Negative  Negative  Negative   SG  1.009  1.014  1.014  1.007   UBLD  Negative  Negative  Negative  Negative   URINEPH  5.0  6.0  6.0  5.5   PROTEIN  Negative  Negative  Negative  Negative   NITRITE  Negative  Negative  Negative  Negative   LEUKEST  Negative  Negative  Small*  Negative   RBCU  2  <1  0  0   WBCU  1  1  <1  1     Recent Labs   Lab Test  01/10/18   1313  07/11/17   1450 05/26/16   1511 08/14/14   0950  06/17/14   1153  07/12/13   0850  06/20/13   0930  06/20/13   0615  07/13/12   0845  07/13/12   0645  10/13/11   1040   UTPG  0.11  0.11  0.07  0.11  0.18  <0.03  0.39*  0.21*   <0.04  0.16  <0.02  0.31*     PTH  Recent Labs   Lab Test  16   1511   PTHI  21     IRON STUDIES  No lab results found.  IMAGIN2016 Renal U/S:  Right kidney 6.3 cm and left kidney 11.6 cm.      Total time spent was >40 minutes, and more than 50% of face to face time was spent in counseling and/or coordination of care regarding principles of multidisciplinary care, medication management, and chronic kidney disease and HTN education.  Migel Major MD

## 2018-01-14 PROBLEM — N18.2 CKD (CHRONIC KIDNEY DISEASE) STAGE 2, GFR 60-89 ML/MIN: Status: ACTIVE | Noted: 2018-01-14

## 2018-05-11 ENCOUNTER — MYC REFILL (OUTPATIENT)
Dept: NEPHROLOGY | Facility: CLINIC | Age: 23
End: 2018-05-11

## 2018-05-11 DIAGNOSIS — R80.9 PROTEINURIA: ICD-10-CM

## 2018-05-11 DIAGNOSIS — N27.0 UNILATERAL SMALL KIDNEY: ICD-10-CM

## 2018-05-11 DIAGNOSIS — I10 ESSENTIAL HYPERTENSION: ICD-10-CM

## 2018-05-11 RX ORDER — AMLODIPINE BESYLATE 10 MG/1
10 TABLET ORAL DAILY
Qty: 30 TABLET | Refills: 6 | Status: CANCELLED | OUTPATIENT
Start: 2018-05-11

## 2018-05-15 ENCOUNTER — TELEPHONE (OUTPATIENT)
Dept: NEPHROLOGY | Facility: CLINIC | Age: 23
End: 2018-05-15

## 2018-05-15 DIAGNOSIS — I10 ESSENTIAL HYPERTENSION: ICD-10-CM

## 2018-05-15 DIAGNOSIS — N27.0 UNILATERAL SMALL KIDNEY: ICD-10-CM

## 2018-05-15 RX ORDER — AMLODIPINE BESYLATE 10 MG/1
10 TABLET ORAL DAILY
Qty: 90 TABLET | Refills: 3 | Status: SHIPPED | OUTPATIENT
Start: 2018-05-15 | End: 2019-05-02

## 2018-05-15 NOTE — TELEPHONE ENCOUNTER
Informed patient that refill request for amlodipine was sent.  Adali Méndez LPN  Nephrology  [phone]

## 2018-05-15 NOTE — TELEPHONE ENCOUNTER
[facility] Call [facility]    Phone Message    May a detailed message be left on voicemail: yes    Reason for Call: Other: RX refill:  amLODIPine (NORVASC) 10 MG.  Pt submitted a RX refill on 5/11/18 and original prescriber was Varsha Lindo.  Please let pt know if Dr. Major will refill this med, as pt needs this med.     Action Taken: Other: [facility]

## 2018-05-15 NOTE — TELEPHONE ENCOUNTER
[facility]  Katie Webb RN Fri May 11, 2018 4:33 PM        ----- Message -----   From: Charisma Valencia   Sent: 5/11/2018 4:26 PM   To: [facility] - [facility]  Subject: Medication Renewal Request     Original authorizing provider: MD Charisma Bello would like a refill of the following medications:  amLODIPine (NORVASC) 10 MG tablet [Varsha Lindo MD]    Preferred pharmacy: [address]    Comment:

## 2019-05-02 DIAGNOSIS — N27.0 UNILATERAL SMALL KIDNEY: ICD-10-CM

## 2019-05-02 DIAGNOSIS — I10 ESSENTIAL HYPERTENSION: ICD-10-CM

## 2019-05-02 RX ORDER — AMLODIPINE BESYLATE 10 MG/1
TABLET ORAL
Qty: 90 TABLET | Refills: 3 | Status: SHIPPED | OUTPATIENT
Start: 2019-05-02 | End: 2020-04-27

## 2019-11-05 ENCOUNTER — HEALTH MAINTENANCE LETTER (OUTPATIENT)
Age: 24
End: 2019-11-05

## 2020-04-27 DIAGNOSIS — I10 ESSENTIAL HYPERTENSION: ICD-10-CM

## 2020-04-27 DIAGNOSIS — N27.0 UNILATERAL SMALL KIDNEY: ICD-10-CM

## 2020-04-27 RX ORDER — AMLODIPINE BESYLATE 10 MG/1
10 TABLET ORAL DAILY
Qty: 90 TABLET | Refills: 3 | Status: SHIPPED | OUTPATIENT
Start: 2020-04-27 | End: 2020-07-30

## 2020-06-01 ENCOUNTER — VIRTUAL VISIT (OUTPATIENT)
Dept: NEPHROLOGY | Facility: CLINIC | Age: 25
End: 2020-06-01
Attending: INTERNAL MEDICINE
Payer: COMMERCIAL

## 2020-06-01 DIAGNOSIS — N18.2 CKD (CHRONIC KIDNEY DISEASE) STAGE 2, GFR 60-89 ML/MIN: Primary | ICD-10-CM

## 2020-06-01 ASSESSMENT — PAIN SCALES - GENERAL: PAINLEVEL: NO PAIN (0)

## 2020-06-01 NOTE — PROGRESS NOTES
Nephrology Clinic    Charisma Valencia MRN:[id number] YOB: 1995  Date of Service: June 1, 2020     Primary care provider: Edel Daily  Requesting physician: Lorenzo Houston     ASSESSMENT AND RECOMMENDATIONS:   1. 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  Her renal function has been mildly reduced but stable with a baseline Cr of ~0.9 mg/dL with an eGFR of 77 ml/min though we don't have any recent labs.  Of note, her iohexol in 2012 was 76 ml/min (when Cr was 0.9 mg/dL) suggesting that creatinine-based eGFR is fairly accurate.  She has had minimal albuminuria of ~40 mg/g.    -obtain labs post COVID crisis  -there has been no pressing need to restart RAAS blockade  -will consider restarting enalapril in the future should she develop worsening albuminuria  -if enalapril restarted, will have to remain mindful that she is sexually active  -will consider surveillance renal ultrasound in 1-2 years  -otherwise, RTC in 1 year    2. HTN: Likely related to CKD.  Per her report she has had 24 BP monitoring.  SBP has been at goal of <130 though she has not checked it over the past year.  -check home BP at home on occasion to be sure that BP continues to be under good control  -will continue amlodipine at 10 mg daily      REASON FOR CONSULT: CKD (hypoplastic/dysplastic right kidney)    HISTORY OF PRESENT ILLNESS:   Charisma Valencia is a 24 year old woman who presents for f/u regarding CKD.  She has been previously followed by Dr. Lorenzo Houston in the in [facility].  She has a history of long-standing asymptomatic non-nephrotic range isolated proteinuria, hypertension and hypoplastic/dysplastic right kidney.  Her last renal ultrasound (May, 2016) revealed a right kidney of 6.3 cm (with little growth by comparison) and left kidney 11.6 cm. She had been on enalapril in the past but was transitioned to amlodipine 10 mg daily.  She does not measure her blood pressure at home. Her proteinuria has been  minimal despite being off enalapril.  She is not actively trying to become pregnant. Overall, she feels well and is without medical complaints.  She denies gross hematuria, significant edema, dysuria or urinary tract infection. She denies recent fever, headache, dizziness, blurry vision, hearing difficulty, earache, sore throat, cough, shortness of breath, palpitation, abdominal pain, vomiting, abnormal bowel movement, skin rashes, joint pain/swelling or muscle weakness. The remainder of comprehensive review of systems was unremarkable.  She has graduated and is now a .      PAST MEDICAL HISTORY:  -CKD, stage 2: Due to hypoplastic/dysplastic right kidney  -HTN    PAST SURGICAL HISTORY:  No past surgical history on file.  MEDICATIONS:  Prescription Medications as of 6/1/2020       Rx Number Disp Refills Start End Last Dispensed Date Next Fill Date Owning Pharmacy    amLODIPine (NORVASC) 10 MG tablet  90 tablet 3 4/27/2020    [facility] [address] [place] [facility] [address]    Sig: Take 1 tablet (10 mg) by mouth daily    Class: E-Prescribe    Route: Oral    norgestrel-ethinyl estradiol (LO/OVRAL) 0.3-30 MG-MCG tablet    7/9/2019 7/8/2020   [facility] [address] [place] [facility] [address]    Sig: Take 1 tablet by mouth    Class: Historical    Route: Oral         ALLERGIES:    No Known Allergies  REVIEW OF SYSTEMS:  A comprehensive review of systems was performed and found to be negative except as described here or above.  SOCIAL HISTORY:   Social History     Socioeconomic History     Marital status: Single     Spouse name: Not on file     Number of children: Not on file     Years of education: Not on file     Highest education level: Not on file   Occupational History     Not on file   Social Needs     Financial resource strain: Not on file     Food insecurity     Worry: Not on file     Inability: Not on file     Transportation needs     Medical: Not on file     Non-medical: Not on file   Tobacco  Use     Smoking status: Never Smoker     Smokeless tobacco: Never Used   Substance and Sexual Activity     Alcohol use: Not on file     Drug use: Not on file     Sexual activity: Not on file   Lifestyle     Physical activity     Days per week: Not on file     Minutes per session: Not on file     Stress: Not on file   Relationships     Social connections     Talks on phone: Not on file     Gets together: Not on file     Attends Spiritism service: Not on file     Active member of club or organization: Not on file     Attends meetings of clubs or organizations: Not on file     Relationship status: Not on file     Intimate partner violence     Fear of current or ex partner: Not on file     Emotionally abused: Not on file     Physically abused: Not on file     Forced sexual activity: Not on file   Other Topics Concern     Not on file   Social History Narrative     Not on file     FAMILY MEDICAL HISTORY:   Reviewed and noncontributory.     PHYSICAL EXAM:   There were no vitals taken for this visit.  GENERAL APPEARANCE: alert and no distress  EYES: nonicteric  HENT: mouth without ulcers or lesions  NECK: supple, no adenopathy  RESP: lungs clear to auscultation   CV: regular rhythm, normal rate, no rub  ABDOMEN: soft, nontender, normal bowel sounds, no HSM   : No CVA tenderness  Extremities: no edema  MS: no evidence of inflammation in joints, no muscle tenderness  SKIN: no concerning rash  NEURO: mentation intact and speech normal  PSYCH: affect normal/bright   LABS:   CMP  Recent Labs   Lab Test 01/10/18  1258 07/11/17  1441 05/26/16  1511 08/14/14  0950    139 138 138   POTASSIUM 3.8 3.6 3.7 3.8   CHLORIDE 106 108 106 105   CO2 22 24 25 23   ANIONGAP 9 7 8 10   GLC 98 102* 108* 87   BUN 16 14 16 19   CR 0.91 0.98 0.82 0.89   GFRESTIMATED 77 71 88 81   GFRESTBLACK >90 86 >90   GFR Calc   >90   GFR Calc     MERCY 8.5 8.4* 9.4 9.6   MAG  --   --  2.0  --    PHOS 2.6 2.4* 3.0 2.6  "  ALBUMIN 4.3 4.1 4.3 4.8   ALKPHOS  --   --  80  --      CBC  Recent Labs   Lab Test 01/10/18  1258 17  1441 16  1511 13  0930 12  0855   HGB 13.7 13.3 13.7 14.4 14.3   WBC  --  9.5 8.9 7.7 6.7   RBC  --  4.51 4.63 4.73 4.65   HCT  --  40.8 42.2 43.2 42.0   MCV  --  91 91 91 90   MCH  --  29.5 29.6 30.4 30.8   MCHC  --  32.6 32.5 33.3 34.0   RDW  --  12.0 12.0 12.9 12.1   PLT  --  228 236 222 220     URINE STUDIES  Recent Labs   Lab Test 01/10/18  1313 17  1450 16  1511 14  0950   COLOR Yellow Yellow Light Yellow Straw   APPEARANCE Clear Clear Clear Clear   URINEGLC Negative Negative Negative Negative   URINEBILI Negative Negative Negative Negative   URINEKETONE Negative Negative Negative Negative   SG 1.009 1.014 1.014 1.007   UBLD Negative Negative Negative Negative   URINEPH 5.0 6.0 6.0 5.5   PROTEIN Negative Negative Negative Negative   NITRITE Negative Negative Negative Negative   LEUKEST Negative Negative Small* Negative   RBCU 2 <1 0 0   WBCU 1 1 <1 1     Recent Labs   Lab Test 01/10/18  1313 17  1450 16  1511 14  0950 14  1153 13  0850 13  0930 13  0615 12  0845 12  0645   UTPG 0.11 0.11 0.07  0.11 0.18 <0.03 0.39* 0.21* <0.04 0.16 <0.02     PTH  Recent Labs   Lab Test 01/10/18  1258 16  1511   PTHI 28 21     IRON STUDIES  No lab results found.  IMAGIN2016 Renal U/S:  Right kidney 6.3 cm and left kidney 11.6 cm.        Charisma Valencia is a 24 year old female who is being evaluated via a billable video visit.      The patient has been notified of following:     \"This video visit will be conducted via a call between you and your physician/provider. We have found that certain health care needs can be provided without the need for an in-person physical exam.  This service lets us provide the care you need with a video conversation.  If a prescription is necessary we can send it directly to your pharmacy.  " "If lab work is needed we can place an order for that and you can then stop by our lab to have the test done at a later time.    Video visits are billed at different rates depending on your insurance coverage.  Please reach out to your insurance provider with any questions.    If during the course of the call the physician/provider feels a video visit is not appropriate, you will not be charged for this service.\"    Patient has given verbal consent for Video visit? Yes    How would you like to obtain your AVS? Marty    Patient would like the video invitation sent by: Send to e-mail at: [email or web address]    Will anyone else be joining your video visit? No        Video-Visit Details    Type of service:  Video Visit    Video Start Time: 9:01 AM  Video End Time: 9:51 AM    Originating Location (pt. Location): Home    Distant Location (provider location):  [facility] NEPHROLOGY     Platform used for Video Visit: [facility]    Migel Major MD    Total time spent was >40 minutes, and more than 50% of face to face time was spent in counseling and/or coordination of care regarding principles of multidisciplinary care, medication management, and chronic kidney disease education.  Migel Major MD    "

## 2020-06-01 NOTE — LETTER
6/1/2020       RE: Charisma Valencia  [address]  [facility] [address]     Dear Colleague,    Thank you for referring your patient, Charisma Valencia, to the [facility] NEPHROLOGY at [facility]. Please see a copy of my visit note below.    Nephrology Clinic    Charisma Valencia MRN:[id number] YOB: 1995  Date of Service: June 1, 2020     Primary care provider: Edel Daily  Requesting physician: Lorenzo Houston     ASSESSMENT AND RECOMMENDATIONS:   1. 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  Her renal function has been mildly reduced but stable with a baseline Cr of ~0.9 mg/dL with an eGFR of 77 ml/min though we don't have any recent labs.  Of note, her iohexol in 2012 was 76 ml/min (when Cr was 0.9 mg/dL) suggesting that creatinine-based eGFR is fairly accurate.  She has had minimal albuminuria of ~40 mg/g.    -obtain labs post COVID crisis  -there has been no pressing need to restart RAAS blockade  -will consider restarting enalapril in the future should she develop worsening albuminuria  -if enalapril restarted, will have to remain mindful that she is sexually active  -will consider surveillance renal ultrasound in 1-2 years  -otherwise, RTC in 1 year    2. HTN: Likely related to CKD.  Per her report she has had 24 BP monitoring.  SBP has been at goal of <130 though she has not checked it over the past year.  -check home BP at home on occasion to be sure that BP continues to be under good control  -will continue amlodipine at 10 mg daily      REASON FOR CONSULT: CKD (hypoplastic/dysplastic right kidney)    HISTORY OF PRESENT ILLNESS:   Charisma Valencia is a 24 year old woman who presents for f/u regarding CKD.  She has been previously followed by Dr. Lorenzo Houston in the in the [facility]al [facility].  She has a history of long-standing asymptomatic non-nephrotic range isolated proteinuria, hypertension and hypoplastic/dysplastic right kidney.  Her last renal  ultrasound (May, 2016) revealed a right kidney of 6.3 cm (with little growth by comparison) and left kidney 11.6 cm. She had been on enalapril in the past but was transitioned to amlodipine 10 mg daily.  She does not measure her blood pressure at home. Her proteinuria has been minimal despite being off enalapril.  She is not actively trying to become pregnant. Overall, she feels well and is without medical complaints.  She denies gross hematuria, significant edema, dysuria or urinary tract infection. She denies recent fever, headache, dizziness, blurry vision, hearing difficulty, earache, sore throat, cough, shortness of breath, palpitation, abdominal pain, vomiting, abnormal bowel movement, skin rashes, joint pain/swelling or muscle weakness. The remainder of comprehensive review of systems was unremarkable.  She has graduated and is now a .      PAST MEDICAL HISTORY:  -CKD, stage 2: Due to hypoplastic/dysplastic right kidney  -HTN    PAST SURGICAL HISTORY:  No past surgical history on file.  MEDICATIONS:  Prescription Medications as of 6/1/2020       Rx Number Disp Refills Start End Last Dispensed Date Next Fill Date Owning Pharmacy    amLODIPine (NORVASC) 10 MG tablet  90 tablet 3 4/27/2020    [facility] [address] [place] [facility] [address]    Sig: Take 1 tablet (10 mg) by mouth daily    Class: E-Prescribe    Route: Oral    norgestrel-ethinyl estradiol (LO/OVRAL) 0.3-30 MG-MCG tablet    7/9/2019 7/8/2020   [facility] [address] [place] [facility] [address]    Sig: Take 1 tablet by mouth    Class: Historical    Route: Oral         ALLERGIES:    No Known Allergies  REVIEW OF SYSTEMS:  A comprehensive review of systems was performed and found to be negative except as described here or above.  SOCIAL HISTORY:   Social History     Socioeconomic History     Marital status: Single     Spouse name: Not on file     Number of children: Not on file     Years of education: Not on file     Highest  education level: Not on file   Occupational History     Not on file   Social Needs     Financial resource strain: Not on file     Food insecurity     Worry: Not on file     Inability: Not on file     Transportation needs     Medical: Not on file     Non-medical: Not on file   Tobacco Use     Smoking status: Never Smoker     Smokeless tobacco: Never Used   Substance and Sexual Activity     Alcohol use: Not on file     Drug use: Not on file     Sexual activity: Not on file   Lifestyle     Physical activity     Days per week: Not on file     Minutes per session: Not on file     Stress: Not on file   Relationships     Social connections     Talks on phone: Not on file     Gets together: Not on file     Attends [employer] service: Not on file     Active member of club or organization: Not on file     Attends meetings of clubs or organizations: Not on file     Relationship status: Not on file     Intimate partner violence     Fear of current or ex partner: Not on file     Emotionally abused: Not on file     Physically abused: Not on file     Forced sexual activity: Not on file   Other Topics Concern     Not on file   Social History Narrative     Not on file     FAMILY MEDICAL HISTORY:   Reviewed and noncontributory.     PHYSICAL EXAM:   There were no vitals taken for this visit.  GENERAL APPEARANCE: alert and no distress  EYES: nonicteric  HENT: mouth without ulcers or lesions  NECK: supple, no adenopathy  RESP: lungs clear to auscultation   CV: regular rhythm, normal rate, no rub  ABDOMEN: soft, nontender, normal bowel sounds, no HSM   : No CVA tenderness  Extremities: no edema  MS: no evidence of inflammation in joints, no muscle tenderness  SKIN: no concerning rash  NEURO: mentation intact and speech normal  PSYCH: affect normal/bright   LABS:   CMP  Recent Labs   Lab Test 01/10/18  1258 07/11/17  1441 05/26/16  1511 08/14/14  0950    139 138 138   POTASSIUM 3.8 3.6 3.7 3.8   CHLORIDE 106 108 106 105   CO2 22 24  "25 23   ANIONGAP 9 7 8 10   GLC 98 102* 108* 87   BUN 16 14 16 19   CR 0.91 0.98 0.82 0.89   GFRESTIMATED 77 71 88 81   GFRESTBLACK >90 86 >90   GFR Calc   >90   GFR Calc     MERCY 8.5 8.4* 9.4 9.6   MAG  --   --  2.0  --    PHOS 2.6 2.4* 3.0 2.6   ALBUMIN 4.3 4.1 4.3 4.8   ALKPHOS  --   --  80  --      CBC  Recent Labs   Lab Test 01/10/18  1258 17  1441 16  1511 13  0930 12  0855   HGB 13.7 13.3 13.7 14.4 14.3   WBC  --  9.5 8.9 7.7 6.7   RBC  --  4.51 4.63 4.73 4.65   HCT  --  40.8 42.2 43.2 42.0   MCV  --  91 91 91 90   MCH  --  29.5 29.6 30.4 30.8   MCHC  --  32.6 32.5 33.3 34.0   RDW  --  12.0 12.0 12.9 12.1   PLT  --  228 236 222 220     URINE STUDIES  Recent Labs   Lab Test 01/10/18  1313 17  1450 16  1511 14  0950   COLOR Yellow Yellow Light Yellow Straw   APPEARANCE Clear Clear Clear Clear   URINEGLC Negative Negative Negative Negative   URINEBILI Negative Negative Negative Negative   URINEKETONE Negative Negative Negative Negative   SG 1.009 1.014 1.014 1.007   UBLD Negative Negative Negative Negative   URINEPH 5.0 6.0 6.0 5.5   PROTEIN Negative Negative Negative Negative   NITRITE Negative Negative Negative Negative   LEUKEST Negative Negative Small* Negative   RBCU 2 <1 0 0   WBCU 1 1 <1 1     Recent Labs   Lab Test 01/10/18  1313 17  1450 16  1511 14  0950 14  1153 13  0850 13  0930 13  0615 12  0845 12  0645   UTPG 0.11 0.11 0.07  0.11 0.18 <0.03 0.39* 0.21* <0.04 0.16 <0.02     PTH  Recent Labs   Lab Test 01/10/18  1258 16  1511   PTHI 28 21     IRON STUDIES  No lab results found.  IMAGIN2016 Renal U/S:  Right kidney 6.3 cm and left kidney 11.6 cm.        Charisma Valencia is a 24 year old female who is being evaluated via a billable video visit.      The patient has been notified of following:     \"This video visit will be conducted via a call between you and your " "physician/provider. We have found that certain health care needs can be provided without the need for an in-person physical exam.  This service lets us provide the care you need with a video conversation.  If a prescription is necessary we can send it directly to your pharmacy.  If lab work is needed we can place an order for that and you can then stop by our lab to have the test done at a later time.    Video visits are billed at different rates depending on your insurance coverage.  Please reach out to your insurance provider with any questions.    If during the course of the call the physician/provider feels a video visit is not appropriate, you will not be charged for this service.\"    Patient has given verbal consent for Video visit? Yes    How would you like to obtain your AVS? Marty    Patient would like the video invitation sent by: Send to e-mail at: [email or web address]    Will anyone else be joining your video visit? No        Video-Visit Details    Type of service:  Video Visit    Video Start Time: 9:01 AM  Video End Time: 9:51 AM    Originating Location (pt. Location): Home    Distant Location (provider location):  [facility] NEPHROLOGY     Platform used for Video Visit: [id number]    Migel Major MD    Total time spent was >40 minutes, and more than 50% of face to face time was spent in counseling and/or coordination of care regarding principles of multidisciplinary care, medication management, and chronic kidney disease education.  Migel Major MD      Again, thank you for allowing me to participate in the care of your patient.      Sincerely,    Migel Major MD      "

## 2020-07-27 ENCOUNTER — APPOINTMENT (OUTPATIENT)
Dept: LAB | Facility: OTHER | Age: 25
End: 2020-07-27
Payer: COMMERCIAL

## 2020-07-27 ENCOUNTER — TELEPHONE (OUTPATIENT)
Dept: NEPHROLOGY | Facility: CLINIC | Age: 25
End: 2020-07-27

## 2020-07-27 DIAGNOSIS — N18.2 CKD (CHRONIC KIDNEY DISEASE) STAGE 2, GFR 60-89 ML/MIN: Primary | ICD-10-CM

## 2020-07-27 DIAGNOSIS — I10 ESSENTIAL HYPERTENSION: ICD-10-CM

## 2020-07-27 NOTE — TELEPHONE ENCOUNTER
[facility] Call Center    Phone Message    May a detailed message be left on voicemail: yes     Reason for Call: Other: Patient called in and went into the lab to get her blood drawn and there was no order. She stated that  was going to put in an order and she could do it whenever this summer. Please place the lab order and call the patient so she knows when to schedule the lab appointment. Thank you.     Action Taken: Message routed to:  [facility] ([facility] neph    Travel Screening: Not Applicable

## 2020-07-30 DIAGNOSIS — N18.2 CKD (CHRONIC KIDNEY DISEASE) STAGE 2, GFR 60-89 ML/MIN: ICD-10-CM

## 2020-07-30 DIAGNOSIS — N27.0 UNILATERAL SMALL KIDNEY: ICD-10-CM

## 2020-07-30 DIAGNOSIS — I10 ESSENTIAL HYPERTENSION: ICD-10-CM

## 2020-07-30 LAB
ALBUMIN SERPL-MCNC: 4.2 G/DL (ref 3.4–5)
ANION GAP SERPL CALCULATED.3IONS-SCNC: 6 MMOL/L (ref 3–14)
BUN SERPL-MCNC: 17 MG/DL (ref 7–30)
CALCIUM SERPL-MCNC: 9 MG/DL (ref 8.5–10.1)
CHLORIDE SERPL-SCNC: 108 MMOL/L (ref 94–109)
CO2 SERPL-SCNC: 26 MMOL/L (ref 20–32)
CREAT SERPL-MCNC: 0.93 MG/DL (ref 0.52–1.04)
CREAT UR-MCNC: 45 MG/DL
GFR SERPL CREATININE-BSD FRML MDRD: 86 ML/MIN/{1.73_M2}
GLUCOSE SERPL-MCNC: 98 MG/DL (ref 70–99)
HGB BLD-MCNC: 13.7 G/DL (ref 11.7–15.7)
MICROALBUMIN UR-MCNC: 69 MG/L
MICROALBUMIN/CREAT UR: 154.22 MG/G CR (ref 0–25)
PHOSPHATE SERPL-MCNC: 2.9 MG/DL (ref 2.5–4.5)
POTASSIUM SERPL-SCNC: 4.2 MMOL/L (ref 3.4–5.3)
PROT UR-MCNC: 0.11 G/L
PROT/CREAT 24H UR: 0.24 G/G CR (ref 0–0.2)
SODIUM SERPL-SCNC: 140 MMOL/L (ref 133–144)

## 2020-07-30 PROCEDURE — 84156 ASSAY OF PROTEIN URINE: CPT | Performed by: PHYSICIAN ASSISTANT

## 2020-07-30 PROCEDURE — 85018 HEMOGLOBIN: CPT | Performed by: PHYSICIAN ASSISTANT

## 2020-07-30 PROCEDURE — 80069 RENAL FUNCTION PANEL: CPT | Performed by: PHYSICIAN ASSISTANT

## 2020-07-30 PROCEDURE — 82043 UR ALBUMIN QUANTITATIVE: CPT | Performed by: PHYSICIAN ASSISTANT

## 2020-07-30 PROCEDURE — 36415 COLL VENOUS BLD VENIPUNCTURE: CPT | Performed by: PHYSICIAN ASSISTANT

## 2020-07-30 RX ORDER — AMLODIPINE BESYLATE 10 MG/1
10 TABLET ORAL DAILY
Qty: 90 TABLET | Refills: 3 | Status: SHIPPED | OUTPATIENT
Start: 2020-07-30 | End: 2021-10-04

## 2020-08-06 ENCOUNTER — MYC MEDICAL ADVICE (OUTPATIENT)
Dept: NEPHROLOGY | Facility: CLINIC | Age: 25
End: 2020-08-06

## 2020-11-22 ENCOUNTER — HEALTH MAINTENANCE LETTER (OUTPATIENT)
Age: 25
End: 2020-11-22

## 2021-05-04 DIAGNOSIS — N18.2 CKD (CHRONIC KIDNEY DISEASE) STAGE 2, GFR 60-89 ML/MIN: Primary | ICD-10-CM

## 2021-05-27 ENCOUNTER — RECORDS - HEALTHEAST (OUTPATIENT)
Dept: ADMINISTRATIVE | Facility: CLINIC | Age: 26
End: 2021-05-27

## 2021-06-02 ENCOUNTER — RECORDS - HEALTHEAST (OUTPATIENT)
Dept: ADMINISTRATIVE | Facility: CLINIC | Age: 26
End: 2021-06-02

## 2021-06-02 DIAGNOSIS — N18.2 CKD (CHRONIC KIDNEY DISEASE) STAGE 2, GFR 60-89 ML/MIN: ICD-10-CM

## 2021-06-02 LAB
ALBUMIN SERPL-MCNC: 4.3 G/DL (ref 3.4–5)
ANION GAP SERPL CALCULATED.3IONS-SCNC: 6 MMOL/L (ref 3–14)
BUN SERPL-MCNC: 14 MG/DL (ref 7–30)
CALCIUM SERPL-MCNC: 9 MG/DL (ref 8.5–10.1)
CHLORIDE SERPL-SCNC: 109 MMOL/L (ref 94–109)
CO2 SERPL-SCNC: 27 MMOL/L (ref 20–32)
CREAT SERPL-MCNC: 1.21 MG/DL (ref 0.52–1.04)
CREAT UR-MCNC: 30 MG/DL
GFR SERPL CREATININE-BSD FRML MDRD: 62 ML/MIN/{1.73_M2}
GLUCOSE SERPL-MCNC: 92 MG/DL (ref 70–99)
HGB BLD-MCNC: 15.1 G/DL (ref 11.7–15.7)
MICROALBUMIN UR-MCNC: 89 MG/L
MICROALBUMIN/CREAT UR: 292.46 MG/G CR (ref 0–25)
PHOSPHATE SERPL-MCNC: 3 MG/DL (ref 2.5–4.5)
POTASSIUM SERPL-SCNC: 3.9 MMOL/L (ref 3.4–5.3)
PROT UR-MCNC: 0.12 G/L
PROT/CREAT 24H UR: 0.41 G/G CR (ref 0–0.2)
SODIUM SERPL-SCNC: 142 MMOL/L (ref 133–144)

## 2021-06-02 PROCEDURE — 85018 HEMOGLOBIN: CPT | Performed by: INTERNAL MEDICINE

## 2021-06-02 PROCEDURE — 80069 RENAL FUNCTION PANEL: CPT | Performed by: INTERNAL MEDICINE

## 2021-06-02 PROCEDURE — 83970 ASSAY OF PARATHORMONE: CPT | Performed by: INTERNAL MEDICINE

## 2021-06-02 PROCEDURE — 82306 VITAMIN D 25 HYDROXY: CPT | Performed by: INTERNAL MEDICINE

## 2021-06-02 PROCEDURE — 36415 COLL VENOUS BLD VENIPUNCTURE: CPT | Performed by: INTERNAL MEDICINE

## 2021-06-02 PROCEDURE — 84156 ASSAY OF PROTEIN URINE: CPT | Performed by: INTERNAL MEDICINE

## 2021-06-02 PROCEDURE — 82043 UR ALBUMIN QUANTITATIVE: CPT | Performed by: INTERNAL MEDICINE

## 2021-06-03 LAB — PTH-INTACT SERPL-MCNC: 16 PG/ML (ref 18–80)

## 2021-06-07 ENCOUNTER — VIRTUAL VISIT (OUTPATIENT)
Dept: NEPHROLOGY | Facility: CLINIC | Age: 26
End: 2021-06-07
Attending: INTERNAL MEDICINE
Payer: COMMERCIAL

## 2021-06-07 DIAGNOSIS — N18.2 CKD (CHRONIC KIDNEY DISEASE) STAGE 2, GFR 60-89 ML/MIN: Primary | ICD-10-CM

## 2021-06-07 LAB
DEPRECATED CALCIDIOL+CALCIFEROL SERPL-MC: <70 UG/L (ref 20–75)
VITAMIN D2 SERPL-MCNC: <5 UG/L
VITAMIN D3 SERPL-MCNC: 65 UG/L

## 2021-06-07 PROCEDURE — 99204 OFFICE O/P NEW MOD 45 MIN: CPT | Mod: 95 | Performed by: INTERNAL MEDICINE

## 2021-06-07 SDOH — HEALTH STABILITY: MENTAL HEALTH: HOW OFTEN DO YOU HAVE 6 OR MORE DRINKS ON ONE OCCASION?: NOT ASKED

## 2021-06-07 SDOH — HEALTH STABILITY: MENTAL HEALTH: HOW OFTEN DO YOU HAVE A DRINK CONTAINING ALCOHOL?: MONTHLY OR LESS

## 2021-06-07 SDOH — HEALTH STABILITY: MENTAL HEALTH: HOW MANY STANDARD DRINKS CONTAINING ALCOHOL DO YOU HAVE ON A TYPICAL DAY?: NOT ASKED

## 2021-06-07 NOTE — PROGRESS NOTES
Charisma is a 25 year old who is being evaluated via a billable video visit.      How would you like to obtain your AVS? MyChart  If the video visit is dropped, the invitation should be resent by: Text to cell phone: [phone]  Will anyone else be joining your video visit? No      Video Start Time: 9:00 AM  Video-Visit Details    Type of service:  Video Visit    Video End Time:9:21 AM    Originating Location (pt. Location): Home    Distant Location (provider location):  [facility] NEPHROLOGY [facility]     Platform used for Video Visit: [employer] [place] CMA

## 2021-06-07 NOTE — PROGRESS NOTES
Nephrology Clinic - Video Visit    Charisma Valencia MRN:[id number] YOB: 1995  Date of Service: June 7, 2021      Primary care provider: Edel Daily  Requesting physician: Lorenzo Houston     ASSESSMENT AND RECOMMENDATIONS:   1. 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  Her renal function has been mildly reduced but stable with a baseline Cr of ~0.9 mg/dL with an eGFR of 77 ml/min.  Having said that, her Cr is slightly up from baseline to 1.2 mg/dL in early June, 2021 along with more albuminuria (increased to 292 from 154 mg/g).  Of note, her iohexol in 2012 was 76 ml/min (when Cr was 0.9 mg/dL) suggesting that creatinine-based eGFR is fairly accurate.    -will repeat renal panel and urine albumin in 2 weeks and reassess if ACEi/ARB therapy may be warranted.  Suspect recent change in Cr and albuminuria may be more stress related.   -otherwise, there has been no pressing need to restart RAAS blockade  -will consider restarting enalapril in the future should she develop worsening albuminuria  -if enalapril restarted, will have to remain mindful that she is sexually active  -will consider surveillance renal ultrasound in 1-2 years  -otherwise, RTC in 6 months    2. HTN: Likely related to CKD.  Per her report she has had 24 BP monitoring.  SBP has been at goal of <130 though she has not checked it over the past year.  -check home BP at home on occasion to be sure that BP continues to be under good control  -will continue amlodipine at 10 mg daily    REASON FOR CONSULT: CKD (hypoplastic/dysplastic right kidney)    HISTORY OF PRESENT ILLNESS:   Charisma Valencia is a 26 year old woman who presents for f/u regarding CKD.  She has been previously followed by Dr. Lorenzo Houston in the in the [facility].  She has a history of long-standing asymptomatic non-nephrotic range isolated proteinuria, hypertension and hypoplastic/dysplastic right kidney.  Her last renal ultrasound (May, 2016) revealed a  right kidney of 6.3 cm (with little growth by comparison) and left kidney 11.6 cm. She had been on enalapril in the past but was transitioned to amlodipine 10 mg daily.  She does not measure her blood pressure at home. Her proteinuria has been minimal despite being off enalapril.  She is not actively trying to become pregnant. Overall, she feels well and is without medical complaints.  She denies gross hematuria, significant edema, dysuria or urinary tract infection. She denies recent fever, headache, dizziness, blurry vision, hearing difficulty, earache, sore throat, cough, shortness of breath, palpitation, abdominal pain, vomiting, abnormal bowel movement, skin rashes, joint pain/swelling or muscle weakness. The remainder of comprehensive review of systems was unremarkable.  She has graduated and is now a .      PAST MEDICAL HISTORY:  -CKD, stage 2: Due to hypoplastic/dysplastic right kidney  -HTN    PAST SURGICAL HISTORY:  No past surgical history on file.  MEDICATIONS:  Prescription Medications as of 6/7/2021       Rx Number Disp Refills Start End Last Dispensed Date Next Fill Date Owning Pharmacy    amLODIPine (NORVASC) 10 MG tablet  90 tablet 3 7/30/2020    [facility] [id number] [place] [address]    Sig: Take 1 tablet (10 mg) by mouth daily    Class: E-Prescribe    Route: Oral    norgestrel-ethinyl estradiol (LO/OVRAL) 0.3-30 MG-MCG tablet    7/9/2019 7/8/2020   [facility] [address] [place] [address]    Sig: Take 1 tablet by mouth    Class: Historical    Route: Oral         ALLERGIES:    No Known Allergies  REVIEW OF SYSTEMS:  A comprehensive review of systems was performed and found to be negative except as described here or above.  SOCIAL HISTORY:   Social History     Socioeconomic History     Marital status: Single     Spouse name: Not on file     Number of children: Not on file     Years of education: Not on file     Highest education level: Not on file   Occupational  History     Not on file   Social Needs     Financial resource strain: Not on file     Food insecurity     Worry: Not on file     Inability: Not on file     Transportation needs     Medical: Not on file     Non-medical: Not on file   Tobacco Use     Smoking status: Never Smoker     Smokeless tobacco: Never Used   Substance and Sexual Activity     Alcohol use: Yes     Frequency: Monthly or less     Drug use: Not Currently     Sexual activity: Not on file   Lifestyle     Physical activity     Days per week: Not on file     Minutes per session: Not on file     Stress: Not on file   Relationships     Social connections     Talks on phone: Not on file     Gets together: Not on file     Attends Taoist service: Not on file     Active member of club or organization: Not on file     Attends meetings of clubs or organizations: Not on file     Relationship status: Not on file     Intimate partner violence     Fear of current or ex partner: Not on file     Emotionally abused: Not on file     Physically abused: Not on file     Forced sexual activity: Not on file   Other Topics Concern     Not on file   Social History Narrative     Not on file     FAMILY MEDICAL HISTORY:   Reviewed and noncontributory.     PHYSICAL EXAM:   Physical exam deferred as encounter was a video visit.      LABS:   CMP  Recent Labs   Lab Test 06/02/21  1307 07/30/20  1414 01/10/18  1258 07/11/17  1441 05/26/16  1511    140 136 139 138   POTASSIUM 3.9 4.2 3.8 3.6 3.7   CHLORIDE 109 108 106 108 106   CO2 27 26 22 24 25   ANIONGAP 6 6 9 7 8   GLC 92 98 98 102* 108*   BUN 14 17 16 14 16   CR 1.21* 0.93 0.91 0.98 0.82   GFRESTIMATED 62 86 77 71 88   GFRESTBLACK 72 >90 >90 86 >90   GFR Calc     MERCY 9.0 9.0 8.5 8.4* 9.4   MAG  --   --   --   --  2.0   PHOS 3.0 2.9 2.6 2.4* 3.0   ALBUMIN 4.3 4.2 4.3 4.1 4.3   ALKPHOS  --   --   --   --  80     CBC  Recent Labs   Lab Test 06/02/21  1307 07/30/20  1414 01/10/18  1258 07/11/17  1441  16  1511 13  0930   HGB 15.1 13.7 13.7 13.3 13.7 14.4   WBC  --   --   --  9.5 8.9 7.7   RBC  --   --   --  4.51 4.63 4.73   HCT  --   --   --  40.8 42.2 43.2   MCV  --   --   --  91 91 91   MCH  --   --   --  29.5 29.6 30.4   MCHC  --   --   --  32.6 32.5 33.3   RDW  --   --   --  12.0 12.0 12.9   PLT  --   --   --  228 236 222     URINE STUDIES  Recent Labs   Lab Test 01/10/18  1313 17  1450 16  1511 14  0950   COLOR Yellow Yellow Light Yellow Straw   APPEARANCE Clear Clear Clear Clear   URINEGLC Negative Negative Negative Negative   URINEBILI Negative Negative Negative Negative   URINEKETONE Negative Negative Negative Negative   SG 1.009 1.014 1.014 1.007   UBLD Negative Negative Negative Negative   URINEPH 5.0 6.0 6.0 5.5   PROTEIN Negative Negative Negative Negative   NITRITE Negative Negative Negative Negative   LEUKEST Negative Negative Small* Negative   RBCU 2 <1 0 0   WBCU 1 1 <1 1     Recent Labs   Lab Test 21  1314 20  1421 01/10/18  1313 17  1450 16  1511 14  0950 14  1153 13  0850 13  0930 13  0615   UTPG 0.41* 0.24* 0.11 0.11 0.07  0.11 0.18 <0.03 0.39* 0.21* <0.04     PTH  Recent Labs   Lab Test 21  1307 01/10/18  1258 16  1511   PTHI 16* 28 21     IRON STUDIES  No lab results found.  IMAGIN2016 Renal U/S:  Right kidney 6.3 cm and left kidney 11.6 cm.      All labs and imaging above reviewed by me.    Migel Major MD

## 2021-06-07 NOTE — LETTER
6/7/2021       RE: Charisma Valencia  [address] [address]     Dear Colleague,    Thank you for referring your patient, Charisma Valencia, to the [facility] [place] at [facility]. Please see a copy of my visit note below.    Charisma is a 25 year old who is being evaluated via a billable video visit.      How would you like to obtain your AVS? MyChart  If the video visit is dropped, the invitation should be resent by: Text to cell phone: [phone]  Will anyone else be joining your video visit? No      Video Start Time: 9:00 AM  Video-Visit Details    Type of service:  Video Visit    Video End Time:9:21 AM    Originating Location (pt. Location): Home    Distant Location (provider location):  [facility] [place]     Platform used for Video Visit: [employer] [facility]    Nephrology Clinic - Video Visit    Charisma Valencia MRN:[id number] YOB: 1995  Date of Service: June 7, 2021      Primary care provider: Edel Daily  Requesting physician: Lorenzo Houston     ASSESSMENT AND RECOMMENDATIONS:   1. 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  Her renal function has been mildly reduced but stable with a baseline Cr of ~0.9 mg/dL with an eGFR of 77 ml/min.  Having said that, her Cr is slightly up from baseline to 1.2 mg/dL in early June, 2021 along with more albuminuria (increased to 292 from 154 mg/g).  Of note, her iohexol in 2012 was 76 ml/min (when Cr was 0.9 mg/dL) suggesting that creatinine-based eGFR is fairly accurate.    -will repeat renal panel and urine albumin in 2 weeks and reassess if ACEi/ARB therapy may be warranted.  Suspect recent change in Cr and albuminuria may be more stress related.   -otherwise, there has been no pressing need to restart RAAS blockade  -will consider restarting enalapril in the future should she develop worsening albuminuria  -if enalapril  restarted, will have to remain mindful that she is sexually active  -will consider surveillance renal ultrasound in 1-2 years  -otherwise, RTC in 6 months    2. HTN: Likely related to CKD.  Per her report she has had 24 BP monitoring.  SBP has been at goal of <130 though she has not checked it over the past year.  -check home BP at home on occasion to be sure that BP continues to be under good control  -will continue amlodipine at 10 mg daily    REASON FOR CONSULT: CKD (hypoplastic/dysplastic right kidney)    HISTORY OF PRESENT ILLNESS:   Charisma Valencia is a 26 year old woman who presents for f/u regarding CKD.  She has been previously followed by Dr. Lorenzo Houston in the in the Pediatric [facility]al Clinic.  She has a history of long-standing asymptomatic non-nephrotic range isolated proteinuria, hypertension and hypoplastic/dysplastic right kidney.  Her last renal ultrasound (May, 2016) revealed a right kidney of 6.3 cm (with little growth by comparison) and left kidney 11.6 cm. She had been on enalapril in the past but was transitioned to amlodipine 10 mg daily.  She does not measure her blood pressure at home. Her proteinuria has been minimal despite being off enalapril.  She is not actively trying to become pregnant. Overall, she feels well and is without medical complaints.  She denies gross hematuria, significant edema, dysuria or urinary tract infection. She denies recent fever, headache, dizziness, blurry vision, hearing difficulty, earache, sore throat, cough, shortness of breath, palpitation, abdominal pain, vomiting, abnormal bowel movement, skin rashes, joint pain/swelling or muscle weakness. The remainder of comprehensive review of systems was unremarkable.  She has graduated and is now a .      PAST MEDICAL HISTORY:  -CKD, stage 2: Due to hypoplastic/dysplastic right kidney  -HTN    PAST SURGICAL HISTORY:  No past surgical history on file.  MEDICATIONS:  Prescription Medications as of 6/7/2021        Rx Number Disp Refills Start End Last Dispensed Date Next Fill Date Owning Pharmacy    amLODIPine (NORVASC) 10 MG tablet  90 tablet 3 7/30/2020    [facility] [place] [address]    Sig: Take 1 tablet (10 mg) by mouth daily    Class: E-Prescribe    Route: Oral    norgestrel-ethinyl estradiol (LO/OVRAL) 0.3-30 MG-MCG tablet    7/9/2019 7/8/2020   [facility] [place] [address]    Sig: Take 1 tablet by mouth    Class: Historical    Route: Oral         ALLERGIES:    No Known Allergies  REVIEW OF SYSTEMS:  A comprehensive review of systems was performed and found to be negative except as described here or above.  SOCIAL HISTORY:   Social History     Socioeconomic History     Marital status: Single     Spouse name: Not on file     Number of children: Not on file     Years of education: Not on file     Highest education level: Not on file   Occupational History     Not on file   Social Needs     Financial resource strain: Not on file     Food insecurity     Worry: Not on file     Inability: Not on file     Transportation needs     Medical: Not on file     Non-medical: Not on file   Tobacco Use     Smoking status: Never Smoker     Smokeless tobacco: Never Used   Substance and Sexual Activity     Alcohol use: Yes     Frequency: Monthly or less     Drug use: Not Currently     Sexual activity: Not on file   Lifestyle     Physical activity     Days per week: Not on file     Minutes per session: Not on file     Stress: Not on file   Relationships     Social connections     Talks on phone: Not on file     Gets together: Not on file     Attends Cheondoism service: Not on file     Active member of club or organization: Not on file     Attends meetings of clubs or organizations: Not on file     Relationship status: Not on file     Intimate partner violence     Fear of current or ex partner: Not on file     Emotionally abused: Not on file     Physically abused: Not on file     Forced sexual  activity: Not on file   Other Topics Concern     Not on file   Social History Narrative     Not on file     FAMILY MEDICAL HISTORY:   Reviewed and noncontributory.     PHYSICAL EXAM:   Physical exam deferred as encounter was a video visit.      LABS:   CMP  Recent Labs   Lab Test 06/02/21  1307 07/30/20  1414 01/10/18  1258 07/11/17  1441 05/26/16  1511    140 136 139 138   POTASSIUM 3.9 4.2 3.8 3.6 3.7   CHLORIDE 109 108 106 108 106   CO2 27 26 22 24 25   ANIONGAP 6 6 9 7 8   GLC 92 98 98 102* 108*   BUN 14 17 16 14 16   CR 1.21* 0.93 0.91 0.98 0.82   GFRESTIMATED 62 86 77 71 88   GFRESTBLACK 72 >90 >90 86 >90   GFR Calc     MERCY 9.0 9.0 8.5 8.4* 9.4   MAG  --   --   --   --  2.0   PHOS 3.0 2.9 2.6 2.4* 3.0   ALBUMIN 4.3 4.2 4.3 4.1 4.3   ALKPHOS  --   --   --   --  80     CBC  Recent Labs   Lab Test 06/02/21  1307 07/30/20  1414 01/10/18  1258 07/11/17  1441 05/26/16  1511 06/20/13  0930   HGB 15.1 13.7 13.7 13.3 13.7 14.4   WBC  --   --   --  9.5 8.9 7.7   RBC  --   --   --  4.51 4.63 4.73   HCT  --   --   --  40.8 42.2 43.2   MCV  --   --   --  91 91 91   MCH  --   --   --  29.5 29.6 30.4   MCHC  --   --   --  32.6 32.5 33.3   RDW  --   --   --  12.0 12.0 12.9   PLT  --   --   --  228 236 222     URINE STUDIES  Recent Labs   Lab Test 01/10/18  1313 07/11/17  1450 05/26/16  1511 08/14/14  0950   COLOR Yellow Yellow Light Yellow Straw   APPEARANCE Clear Clear Clear Clear   URINEGLC Negative Negative Negative Negative   URINEBILI Negative Negative Negative Negative   URINEKETONE Negative Negative Negative Negative   SG 1.009 1.014 1.014 1.007   UBLD Negative Negative Negative Negative   URINEPH 5.0 6.0 6.0 5.5   PROTEIN Negative Negative Negative Negative   NITRITE Negative Negative Negative Negative   LEUKEST Negative Negative Small* Negative   RBCU 2 <1 0 0   WBCU 1 1 <1 1     Recent Labs   Lab Test 06/02/21  1314 07/30/20  1421 01/10/18  1313 07/11/17  1450 05/26/16  1511 08/14/14  0950  14  1153 13  0850 13  0930 13  0615   UTPG 0.41* 0.24* 0.11 0.11 0.07  0.11 0.18 <0.03 0.39* 0.21* <0.04     PTH  Recent Labs   Lab Test 21  1307 01/10/18  1258 16  1511   PTHI 16* 28 21     IRON STUDIES  No lab results found.  IMAGIN2016 Renal U/S:  Right kidney 6.3 cm and left kidney 11.6 cm.      All labs and imaging above reviewed by me.    Migel Major MD

## 2021-06-23 DIAGNOSIS — N18.2 CKD (CHRONIC KIDNEY DISEASE) STAGE 2, GFR 60-89 ML/MIN: ICD-10-CM

## 2021-06-23 LAB
ALBUMIN SERPL-MCNC: 4.3 G/DL (ref 3.4–5)
ANION GAP SERPL CALCULATED.3IONS-SCNC: 5 MMOL/L (ref 3–14)
BUN SERPL-MCNC: 17 MG/DL (ref 7–30)
CALCIUM SERPL-MCNC: 9 MG/DL (ref 8.5–10.1)
CHLORIDE SERPL-SCNC: 108 MMOL/L (ref 94–109)
CO2 SERPL-SCNC: 24 MMOL/L (ref 20–32)
CREAT SERPL-MCNC: 1.02 MG/DL (ref 0.52–1.04)
CREAT UR-MCNC: 40 MG/DL
GFR SERPL CREATININE-BSD FRML MDRD: 76 ML/MIN/{1.73_M2}
GLUCOSE SERPL-MCNC: 96 MG/DL (ref 70–99)
MICROALBUMIN UR-MCNC: 55 MG/L
MICROALBUMIN/CREAT UR: 136.16 MG/G CR (ref 0–25)
PHOSPHATE SERPL-MCNC: 2.9 MG/DL (ref 2.5–4.5)
POTASSIUM SERPL-SCNC: 4.2 MMOL/L (ref 3.4–5.3)
SODIUM SERPL-SCNC: 137 MMOL/L (ref 133–144)

## 2021-06-23 PROCEDURE — 82043 UR ALBUMIN QUANTITATIVE: CPT | Performed by: INTERNAL MEDICINE

## 2021-06-23 PROCEDURE — 80069 RENAL FUNCTION PANEL: CPT | Performed by: INTERNAL MEDICINE

## 2021-06-23 PROCEDURE — 36415 COLL VENOUS BLD VENIPUNCTURE: CPT | Performed by: INTERNAL MEDICINE

## 2021-09-19 ENCOUNTER — HEALTH MAINTENANCE LETTER (OUTPATIENT)
Age: 26
End: 2021-09-19

## 2021-10-02 DIAGNOSIS — I10 ESSENTIAL HYPERTENSION: ICD-10-CM

## 2021-10-02 DIAGNOSIS — N27.0 UNILATERAL SMALL KIDNEY: ICD-10-CM

## 2021-10-04 RX ORDER — AMLODIPINE BESYLATE 10 MG/1
TABLET ORAL
Qty: 90 TABLET | Refills: 3 | Status: SHIPPED | OUTPATIENT
Start: 2021-10-04 | End: 2022-09-29

## 2022-01-08 ENCOUNTER — HEALTH MAINTENANCE LETTER (OUTPATIENT)
Age: 27
End: 2022-01-08

## 2022-04-15 ENCOUNTER — DOCUMENTATION ONLY (OUTPATIENT)
Dept: LAB | Facility: OTHER | Age: 27
End: 2022-04-15

## 2022-04-15 ENCOUNTER — TELEPHONE (OUTPATIENT)
Dept: NEPHROLOGY | Facility: CLINIC | Age: 27
End: 2022-04-15
Payer: COMMERCIAL

## 2022-04-15 ENCOUNTER — LAB (OUTPATIENT)
Dept: LAB | Facility: OTHER | Age: 27
End: 2022-04-15
Payer: COMMERCIAL

## 2022-04-15 DIAGNOSIS — N18.2 CKD (CHRONIC KIDNEY DISEASE) STAGE 2, GFR 60-89 ML/MIN: ICD-10-CM

## 2022-04-15 DIAGNOSIS — N18.2 CKD (CHRONIC KIDNEY DISEASE) STAGE 2, GFR 60-89 ML/MIN: Primary | ICD-10-CM

## 2022-04-15 LAB
ALBUMIN SERPL-MCNC: 4.3 G/DL (ref 3.4–5)
ANION GAP SERPL CALCULATED.3IONS-SCNC: 5 MMOL/L (ref 3–14)
BUN SERPL-MCNC: 13 MG/DL (ref 7–30)
CALCIUM SERPL-MCNC: 9 MG/DL (ref 8.5–10.1)
CHLORIDE BLD-SCNC: 109 MMOL/L (ref 94–109)
CO2 SERPL-SCNC: 26 MMOL/L (ref 20–32)
CREAT SERPL-MCNC: 0.87 MG/DL (ref 0.52–1.04)
CREAT UR-MCNC: 72 MG/DL
CREAT UR-MCNC: 72 MG/DL
GFR SERPL CREATININE-BSD FRML MDRD: >90 ML/MIN/1.73M2
GLUCOSE BLD-MCNC: 104 MG/DL (ref 70–99)
HGB BLD-MCNC: 14.6 G/DL (ref 11.7–15.7)
MICROALBUMIN UR-MCNC: 699 MG/L
MICROALBUMIN/CREAT UR: 970.83 MG/G CR (ref 0–25)
PHOSPHATE SERPL-MCNC: 1.9 MG/DL (ref 2.5–4.5)
POTASSIUM BLD-SCNC: 3.8 MMOL/L (ref 3.4–5.3)
PROT UR-MCNC: 0.8 G/L
PROT/CREAT 24H UR: 1.11 G/G CR (ref 0–0.2)
PTH-INTACT SERPL-MCNC: 18 PG/ML (ref 18–80)
SODIUM SERPL-SCNC: 140 MMOL/L (ref 133–144)

## 2022-04-15 PROCEDURE — 82306 VITAMIN D 25 HYDROXY: CPT

## 2022-04-15 PROCEDURE — 85018 HEMOGLOBIN: CPT

## 2022-04-15 PROCEDURE — 36415 COLL VENOUS BLD VENIPUNCTURE: CPT

## 2022-04-15 PROCEDURE — 84156 ASSAY OF PROTEIN URINE: CPT

## 2022-04-15 PROCEDURE — 82043 UR ALBUMIN QUANTITATIVE: CPT

## 2022-04-15 PROCEDURE — 80069 RENAL FUNCTION PANEL: CPT

## 2022-04-15 PROCEDURE — 83970 ASSAY OF PARATHORMONE: CPT

## 2022-04-15 NOTE — PROGRESS NOTES
Patient is coming in today for labs   Please place orders if needed   Thank you   Vita ABREU) [facility]

## 2022-04-15 NOTE — TELEPHONE ENCOUNTER
[facility] Call Center    Phone Message    May a detailed message be left on voicemail: yes     Reason for Call: Order(s): Other:   Reason for requested: Follow up CKD labs  Date needed: 4/15/22- pt already had lab draw this AM  Provider name: Dr. Major      Action Taken: Message routed to:  [facility] [place] [facility]    Travel Screening: Not Applicable

## 2022-04-19 LAB
DEPRECATED CALCIDIOL+CALCIFEROL SERPL-MC: <67 UG/L (ref 20–75)
VITAMIN D2 SERPL-MCNC: <5 UG/L
VITAMIN D3 SERPL-MCNC: 62 UG/L

## 2022-04-25 ENCOUNTER — VIRTUAL VISIT (OUTPATIENT)
Dept: NEPHROLOGY | Facility: CLINIC | Age: 27
End: 2022-04-25
Attending: INTERNAL MEDICINE
Payer: COMMERCIAL

## 2022-04-25 DIAGNOSIS — N18.2 CKD (CHRONIC KIDNEY DISEASE) STAGE 2, GFR 60-89 ML/MIN: ICD-10-CM

## 2022-04-25 DIAGNOSIS — R80.9 ALBUMINURIA: ICD-10-CM

## 2022-04-25 DIAGNOSIS — I10 ESSENTIAL HYPERTENSION: ICD-10-CM

## 2022-04-25 DIAGNOSIS — N27.0 UNILATERAL SMALL KIDNEY: ICD-10-CM

## 2022-04-25 DIAGNOSIS — Z76.89 ENCOUNTER PRIOR TO INITIATION OF MEDICATION: Primary | ICD-10-CM

## 2022-04-25 PROCEDURE — 99214 OFFICE O/P EST MOD 30 MIN: CPT | Mod: 95 | Performed by: INTERNAL MEDICINE

## 2022-04-25 RX ORDER — ENALAPRIL MALEATE 10 MG/1
10 TABLET ORAL DAILY
Qty: 30 TABLET | Refills: 11 | Status: SHIPPED | OUTPATIENT
Start: 2022-04-25 | End: 2022-10-11

## 2022-04-25 NOTE — PROGRESS NOTES
Charisma is a 26 year old who is being evaluated via a billable video visit.      How would you like to obtain your AVS? MyChart  If the video visit is dropped, the invitation should be resent by: Send to e-mail at: [email or web address]  Will anyone else be joining your video visit? No      Video Start Time: 10:30 AM  Video-Visit Details    Type of service:  Video Visit    Video End Time:10:59 AM    Originating Location (pt. Location): Home    Distant Location (provider location):  [facility] [place]     Platform used for Video Visit: [employer]

## 2022-04-25 NOTE — PROGRESS NOTES
Nephrology Clinic - Video Visit    Charisma Valencia MRN:[id number] YOB: 1995  Date of Service: April 25, 2022      Primary care provider: Edel Daily  Requesting physician: Lorenzo Houston     ASSESSMENT AND RECOMMENDATIONS:   1. 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  Her renal function has been mildly reduced but stable with a baseline Cr of ~0.9 mg/dL with an eGFR of 77 ml/min.  Of note, her iohexol in 2012 was 76 ml/min (when Cr was 0.9 mg/dL) suggesting that creatinine-based eGFR is fairly accurate.  Given her worsening albuminuria (136 to 960 mg/g) we will restart her on RAAS blockade.     -will discontinue amlodipine and restart enalapril at 10 mg daily   -we will have to remain mindful that she is sexually active on birth control.  She was counseled on teratogenic complications of enalapril and will notify if she plans on becoming pregnant.  She agreed to undergo HCG testing.  -will obtain repeat renal panel and HCG within 1-2 weeks of starting enalapril      -will consider surveillance renal ultrasound in 1-2 years  -otherwise, RTC in 4 months    2. HTN: Likely related to CKD.  Per her report she has had 24 BP monitoring.  SBP has been at goal of <130 though she has not checked it over the past year.  -check home BP at home on occasion to be sure that BP continues to be under good control  -will discontinue amlodipine at 10 mg daily and start enalapril at 10 mg daily as outlined in the above problem 1.     REASON FOR CONSULT: CKD (hypoplastic/dysplastic right kidney)    HISTORY OF PRESENT ILLNESS:   Charisma Valencia is a 26 year old woman who presents for f/u regarding CKD.  She has been previously followed by Dr. Lorenzo Houston in the in the [facility]al [facility].  She has a history of long-standing asymptomatic non-nephrotic range isolated proteinuria, hypertension and hypoplastic/dysplastic right kidney.  Her last renal ultrasound (May, 2016) revealed a right kidney of 6.3 cm (with  little growth by comparison) and left kidney 11.6 cm. She had been on enalapril in the past but was transitioned to amlodipine 10 mg daily.  She does not measure her blood pressure at home. Her proteinuria has been minimal despite being off enalapril.  She is not actively trying to become pregnant. Overall, she feels well and is without medical complaints.  She denies gross hematuria, significant edema, dysuria or urinary tract infection. She denies recent fever, headache, dizziness, blurry vision, hearing difficulty, earache, sore throat, cough, shortness of breath, palpitation, abdominal pain, vomiting, abnormal bowel movement, skin rashes, joint pain/swelling or muscle weakness. The remainder of comprehensive review of systems was unremarkable.  She has graduated and is now a .  She has no major concerns today though reports that she may be moving such that [facility] may be easier for in-person visits.       PAST MEDICAL HISTORY:  -CKD, stage 2: Due to hypoplastic/dysplastic right kidney  -HTN    PAST SURGICAL HISTORY:  No past surgical history on file.  MEDICATIONS:  Prescription Medications as of 4/25/2022       Rx Number Disp Refills Start End Last Dispensed Date Next Fill Date Owning Pharmacy    amLODIPine (NORVASC) 10 MG tablet  90 tablet 3 10/4/2021    [facility] [place] [address]    Sig: TAKE 1 TABLET BY MOUTH EVERY DAY    Class: E-Prescribe    norgestrel-ethinyl estradiol (LO/OVRAL) 0.3-30 MG-MCG tablet    7/9/2019 7/8/2020   [facility] [place] [address]    Sig: Take 1 tablet by mouth    Class: Historical    Route: Oral         ALLERGIES:    No Known Allergies  REVIEW OF SYSTEMS:  A comprehensive review of systems was performed and found to be negative except as described here or above.  SOCIAL HISTORY:   Social History     Socioeconomic History     Marital status: Single     Spouse name: Not on file     Number of children: Not on file      Years of education: Not on file     Highest education level: Not on file   Occupational History     Not on file   Tobacco Use     Smoking status: Never Smoker     Smokeless tobacco: Never Used   Substance and Sexual Activity     Alcohol use: Yes     Drug use: Not Currently     Sexual activity: Not on file   Other Topics Concern     Not on file   Social History Narrative     Not on file     Social Determinants of Health     Financial Resource Strain: Not on file   Food Insecurity: Not on file   Transportation Needs: Not on file   Physical Activity: Not on file   Stress: Not on file   Social Connections: Not on file   Intimate Partner Violence: Not on file   Housing Stability: Not on file     FAMILY MEDICAL HISTORY:   Reviewed and noncontributory.     PHYSICAL EXAM:   Physical exam deferred as encounter was a video visit.      LABS:   CMP  Recent Labs   Lab Test 04/15/22  1026 06/23/21  1346 06/02/21  1307 07/30/20  1414 01/10/18  1258 07/11/17  1441 05/26/16  1511    137 142 140 136   < > 138   POTASSIUM 3.8 4.2 3.9 4.2 3.8   < > 3.7   CHLORIDE 109 108 109 108 106   < > 106   CO2 26 24 27 26 22   < > 25   ANIONGAP 5 5 6 6 9   < > 8   * 96 92 98 98   < > 108*   BUN 13 17 14 17 16   < > 16   CR 0.87 1.02 1.21* 0.93 0.91   < > 0.82   GFRESTIMATED >90 76 62 86 77   < > 88   GFRESTBLACK  --  88 72 >90 >90   < > >90  African American GFR Calc     MERCY 9.0 9.0 9.0 9.0 8.5   < > 9.4   MAG  --   --   --   --   --   --  2.0   PHOS 1.9* 2.9 3.0 2.9 2.6   < > 3.0   ALBUMIN 4.3 4.3 4.3 4.2 4.3   < > 4.3   ALKPHOS  --   --   --   --   --   --  80    < > = values in this interval not displayed.     CBC  Recent Labs   Lab Test 04/15/22  1026 06/02/21  1307 07/30/20  1414 01/10/18  1258 07/11/17  1441 05/26/16  1511   HGB 14.6 15.1 13.7 13.7 13.3 13.7   WBC  --   --   --   --  9.5 8.9   RBC  --   --   --   --  4.51 4.63   HCT  --   --   --   --  40.8 42.2   MCV  --   --   --   --  91 91   MCH  --   --   --   --  29.5  29.6   MCHC  --   --   --   --  32.6 32.5   RDW  --   --   --   --  12.0 12.0   PLT  --   --   --   --  228 236     URINE STUDIES  Recent Labs   Lab Test 01/10/18  1313 17  1450 16  1511 14  0950   COLOR Yellow Yellow Light Yellow Straw   APPEARANCE Clear Clear Clear Clear   URINEGLC Negative Negative Negative Negative   URINEBILI Negative Negative Negative Negative   URINEKETONE Negative Negative Negative Negative   SG 1.009 1.014 1.014 1.007   UBLD Negative Negative Negative Negative   URINEPH 5.0 6.0 6.0 5.5   PROTEIN Negative Negative Negative Negative   NITRITE Negative Negative Negative Negative   LEUKEST Negative Negative Small* Negative   RBCU 2 <1 0 0   WBCU 1 1 <1 1     Recent Labs   Lab Test 04/15/22  1026 21  1314 20  1421 01/10/18  1313 17  1450 16  1511 14  0950 14  1153   UTPG 1.11* 0.41* 0.24* 0.11 0.11 0.07  0.11 0.18 <0.03     PTH  Recent Labs   Lab Test 04/15/22  1026 21  1307 01/10/18  1258 16  1511   PTHI 18 16* 28 21     IRON STUDIES  No lab results found.  IMAGIN2016 Renal U/S:  Right kidney 6.3 cm and left kidney 11.6 cm.      All labs and imaging above reviewed by me.    Migel Major MD

## 2022-04-25 NOTE — LETTER
4/25/2022       RE: Charisma Valencia  [address] [facility] [address]     Dear Colleague,    Thank you for referring your patient, Charisma Valencia, to the [facility] [place] at [facility]. Please see a copy of my visit note below.    Charisma is a 26 year old who is being evaluated via a billable video visit.      How would you like to obtain your AVS? MyChart  If the video visit is dropped, the invitation should be resent by: Send to e-mail at: [email or web address]  Will anyone else be joining your video visit? No      Video Start Time: 10:30 AM  Video-Visit Details    Type of service:  Video Visit    Video End Time:10:59 AM    Originating Location (pt. Location): Home    Distant Location (provider location):  [facility] [place]     Platform used for Video Visit: [facility]    Nephrology Clinic - Video Visit    Charisma Valencia MRN:[id number] YOB: 1995  Date of Service: April 25, 2022      Primary care provider: Edel Daily  Requesting physician: Lorenzo Houston     ASSESSMENT AND RECOMMENDATIONS:   1. 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  Her renal function has been mildly reduced but stable with a baseline Cr of ~0.9 mg/dL with an eGFR of 77 ml/min.  Of note, her iohexol in 2012 was 76 ml/min (when Cr was 0.9 mg/dL) suggesting that creatinine-based eGFR is fairly accurate.  Given her worsening albuminuria (136 to 960 mg/g) we will restart her on RAAS blockade.     -will discontinue amlodipine and restart enalapril at 10 mg daily   -we will have to remain mindful that she is sexually active on birth control.  She was counseled on teratogenic complications of enalapril and will notify if she plans on becoming pregnant.  She agreed to undergo HCG testing.  -will obtain repeat renal panel and HCG within 1-2 weeks of starting enalapril      -will consider surveillance renal ultrasound in  1-2 years  -otherwise, RTC in 4 months    2. HTN: Likely related to CKD.  Per her report she has had 24 BP monitoring.  SBP has been at goal of <130 though she has not checked it over the past year.  -check home BP at home on occasion to be sure that BP continues to be under good control  -will discontinue amlodipine at 10 mg daily and start enalapril at 10 mg daily as outlined in the above problem 1.     REASON FOR CONSULT: CKD (hypoplastic/dysplastic right kidney)    HISTORY OF PRESENT ILLNESS:   Charisma Valencia is a 26 year old woman who presents for f/u regarding CKD.  She has been previously followed by Dr. Lorenzo Houston in the in the [facility].  She has a history of long-standing asymptomatic non-nephrotic range isolated proteinuria, hypertension and hypoplastic/dysplastic right kidney.  Her last renal ultrasound (May, 2016) revealed a right kidney of 6.3 cm (with little growth by comparison) and left kidney 11.6 cm. She had been on enalapril in the past but was transitioned to amlodipine 10 mg daily.  She does not measure her blood pressure at home. Her proteinuria has been minimal despite being off enalapril.  She is not actively trying to become pregnant. Overall, she feels well and is without medical complaints.  She denies gross hematuria, significant edema, dysuria or urinary tract infection. She denies recent fever, headache, dizziness, blurry vision, hearing difficulty, earache, sore throat, cough, shortness of breath, palpitation, abdominal pain, vomiting, abnormal bowel movement, skin rashes, joint pain/swelling or muscle weakness. The remainder of comprehensive review of systems was unremarkable.  She has graduated and is now a .  She has no major concerns today though reports that she may be moving such that [facility] may be easier for in-person visits.       PAST MEDICAL HISTORY:  -CKD, stage 2: Due to hypoplastic/dysplastic right kidney  -HTN    PAST SURGICAL  HISTORY:  No past surgical history on file.  MEDICATIONS:  Prescription Medications as of 4/25/2022       Rx Number Disp Refills Start End Last Dispensed Date Next Fill Date Owning Pharmacy    amLODIPine (NORVASC) 10 MG tablet  90 tablet 3 10/4/2021    [facility] [id number] [place] [address]    Sig: TAKE 1 TABLET BY MOUTH EVERY DAY    Class: E-Prescribe    norgestrel-ethinyl estradiol (LO/OVRAL) 0.3-30 MG-MCG tablet    7/9/2019 7/8/2020   [facility] [place] [address]    Sig: Take 1 tablet by mouth    Class: Historical    Route: Oral         ALLERGIES:    No Known Allergies  REVIEW OF SYSTEMS:  A comprehensive review of systems was performed and found to be negative except as described here or above.  SOCIAL HISTORY:   Social History     Socioeconomic History     Marital status: Single     Spouse name: Not on file     Number of children: Not on file     Years of education: Not on file     Highest education level: Not on file   Occupational History     Not on file   Tobacco Use     Smoking status: Never Smoker     Smokeless tobacco: Never Used   Substance and Sexual Activity     Alcohol use: Yes     Drug use: Not Currently     Sexual activity: Not on file   Other Topics Concern     Not on file   Social History Narrative     Not on file     Social Determinants of Health     Financial Resource Strain: Not on file   Food Insecurity: Not on file   Transportation Needs: Not on file   Physical Activity: Not on file   Stress: Not on file   Social Connections: Not on file   Intimate Partner Violence: Not on file   Housing Stability: Not on file     FAMILY MEDICAL HISTORY:   Reviewed and noncontributory.     PHYSICAL EXAM:   Physical exam deferred as encounter was a video visit.      LABS:   CMP  Recent Labs   Lab Test 04/15/22  1026 06/23/21  1346 06/02/21  1307 07/30/20  1414 01/10/18  1258 07/11/17  1441 05/26/16  1511    137 142 140 136   < > 138   POTASSIUM 3.8 4.2 3.9 4.2 3.8    < > 3.7   CHLORIDE 109 108 109 108 106   < > 106   CO2 26 24 27 26 22   < > 25   ANIONGAP 5 5 6 6 9   < > 8   * 96 92 98 98   < > 108*   BUN 13 17 14 17 16   < > 16   CR 0.87 1.02 1.21* 0.93 0.91   < > 0.82   GFRESTIMATED >90 76 62 86 77   < > 88   GFRESTBLACK  --  88 72 >90 >90   < > >90  African American GFR Calc     MERCY 9.0 9.0 9.0 9.0 8.5   < > 9.4   MAG  --   --   --   --   --   --  2.0   PHOS 1.9* 2.9 3.0 2.9 2.6   < > 3.0   ALBUMIN 4.3 4.3 4.3 4.2 4.3   < > 4.3   ALKPHOS  --   --   --   --   --   --  80    < > = values in this interval not displayed.     CBC  Recent Labs   Lab Test 04/15/22  1026 06/02/21  1307 07/30/20  1414 01/10/18  1258 07/11/17  1441 05/26/16  1511   HGB 14.6 15.1 13.7 13.7 13.3 13.7   WBC  --   --   --   --  9.5 8.9   RBC  --   --   --   --  4.51 4.63   HCT  --   --   --   --  40.8 42.2   MCV  --   --   --   --  91 91   MCH  --   --   --   --  29.5 29.6   MCHC  --   --   --   --  32.6 32.5   RDW  --   --   --   --  12.0 12.0   PLT  --   --   --   --  228 236     URINE STUDIES  Recent Labs   Lab Test 01/10/18  1313 07/11/17  1450 05/26/16  1511 08/14/14  0950   COLOR Yellow Yellow Light Yellow Straw   APPEARANCE Clear Clear Clear Clear   URINEGLC Negative Negative Negative Negative   URINEBILI Negative Negative Negative Negative   URINEKETONE Negative Negative Negative Negative   SG 1.009 1.014 1.014 1.007   UBLD Negative Negative Negative Negative   URINEPH 5.0 6.0 6.0 5.5   PROTEIN Negative Negative Negative Negative   NITRITE Negative Negative Negative Negative   LEUKEST Negative Negative Small* Negative   RBCU 2 <1 0 0   WBCU 1 1 <1 1     Recent Labs   Lab Test 04/15/22  1026 06/02/21  1314 07/30/20  1421 01/10/18  1313 07/11/17  1450 05/26/16  1511 08/14/14  0950 06/17/14  1153   UTPG 1.11* 0.41* 0.24* 0.11 0.11 0.07  0.11 0.18 <0.03     PTH  Recent Labs   Lab Test 04/15/22  1026 06/02/21  1307 01/10/18  1258 05/26/16  1511   PTHI 18 16* 28 21     IRON STUDIES  No lab  results found.  IMAGIN2016 Renal U/S:  Right kidney 6.3 cm and left kidney 11.6 cm.      All labs and imaging above reviewed by me.    Migel Major MD

## 2022-04-25 NOTE — PATIENT INSTRUCTIONS
-Please discontinue amlodipine  -Please start enalapril at 10 mg daily to help reduce the amount of albumin leaking past your kidneys into the urine as well as for blood pressure control  -Please measure your blood pressure daily or every other day until we have a good understanding of your average blood pressure after making the medication changes following this visit  -Please also have your kidney function and urine tested in approximately 2 weeks  -Please let us know how you are doing over the next few weeks.  We can reached at [phone].  -Otherwise, please return to clinic in 4 months

## 2022-05-03 ENCOUNTER — TELEPHONE (OUTPATIENT)
Dept: NEPHROLOGY | Facility: CLINIC | Age: 27
End: 2022-05-03
Payer: COMMERCIAL

## 2022-08-03 ENCOUNTER — LAB (OUTPATIENT)
Dept: LAB | Facility: CLINIC | Age: 27
End: 2022-08-03
Payer: COMMERCIAL

## 2022-08-03 ENCOUNTER — TELEPHONE (OUTPATIENT)
Dept: NEPHROLOGY | Facility: CLINIC | Age: 27
End: 2022-08-03

## 2022-08-03 DIAGNOSIS — R25.2 LEG CRAMPS: ICD-10-CM

## 2022-08-03 DIAGNOSIS — R53.83 FATIGUE: ICD-10-CM

## 2022-08-03 DIAGNOSIS — N18.2 CKD (CHRONIC KIDNEY DISEASE) STAGE 2, GFR 60-89 ML/MIN: ICD-10-CM

## 2022-08-03 DIAGNOSIS — N18.2 CKD (CHRONIC KIDNEY DISEASE) STAGE 2, GFR 60-89 ML/MIN: Primary | ICD-10-CM

## 2022-08-03 LAB
ALBUMIN UR-MCNC: 100 MG/DL
APPEARANCE UR: CLEAR
BACTERIA #/AREA URNS HPF: ABNORMAL /HPF
BILIRUB UR QL STRIP: NEGATIVE
COLOR UR AUTO: YELLOW
ERYTHROCYTE [DISTWIDTH] IN BLOOD BY AUTOMATED COUNT: 11.2 % (ref 10–15)
GLUCOSE UR STRIP-MCNC: NEGATIVE MG/DL
HCT VFR BLD AUTO: 42.8 % (ref 35–47)
HGB BLD-MCNC: 14.3 G/DL (ref 11.7–15.7)
HGB UR QL STRIP: NEGATIVE
KETONES UR STRIP-MCNC: ABNORMAL MG/DL
LEUKOCYTE ESTERASE UR QL STRIP: NEGATIVE
MCH RBC QN AUTO: 29.5 PG (ref 26.5–33)
MCHC RBC AUTO-ENTMCNC: 33.4 G/DL (ref 31.5–36.5)
MCV RBC AUTO: 88 FL (ref 78–100)
NITRATE UR QL: NEGATIVE
PH UR STRIP: 5.5 [PH] (ref 5–7)
PLATELET # BLD AUTO: 263 10E3/UL (ref 150–450)
RBC # BLD AUTO: 4.85 10E6/UL (ref 3.8–5.2)
RBC #/AREA URNS AUTO: ABNORMAL /HPF
SP GR UR STRIP: >=1.03 (ref 1–1.03)
SQUAMOUS #/AREA URNS AUTO: ABNORMAL /LPF
UROBILINOGEN UR STRIP-ACNC: 0.2 E.U./DL
WBC # BLD AUTO: 7.8 10E3/UL (ref 4–11)
WBC #/AREA URNS AUTO: ABNORMAL /HPF

## 2022-08-03 PROCEDURE — 87086 URINE CULTURE/COLONY COUNT: CPT

## 2022-08-03 PROCEDURE — 85027 COMPLETE CBC AUTOMATED: CPT

## 2022-08-03 PROCEDURE — 83735 ASSAY OF MAGNESIUM: CPT

## 2022-08-03 PROCEDURE — 81001 URINALYSIS AUTO W/SCOPE: CPT

## 2022-08-03 PROCEDURE — 80053 COMPREHEN METABOLIC PANEL: CPT

## 2022-08-03 PROCEDURE — 36415 COLL VENOUS BLD VENIPUNCTURE: CPT

## 2022-08-03 NOTE — TELEPHONE ENCOUNTER
Call to patient who reports that these symptoms, achy legs, fatigue, weakness, no appetite has been on and off for a couple of weeks now. She reports that her blood pressure July 27th was 118/75. She did not write down her HR. She reports no swelling or edema, no fevers and no medication changes other than stopping her birth control a month ago.  She reports that she is taking amlodipine as she was not going to switch back to enalapril until after her wedding and honeymoon coming up.   She also reports to be in between Primary providers which is why she called Nephrology.  Will send this to Dr Major for recommendations and follow up with the patient  Adali Méndez LPN  Nephrology  [phone]

## 2022-08-03 NOTE — TELEPHONE ENCOUNTER
[facility] Call Center    Phone Message    May a detailed message be left on voicemail: yes     Reason for Call: Symptoms or Concerns     If patient has red-flag symptoms, warm transfer to triage line    Current symptom or concern: super weak, no energy, no appetite, aches in legs - not pain just uncomfortable, pinching feeling in chest - not consistent just every so often its a pinch feeling.    Stopped taking Birth Control pills roughly a month ago - not sure if side effect from that    Symptoms have been present for:  Past couple week(s)    Has patient previously been seen for this? No      Are there any new or worsening symptoms? No - just lingering not getting worse      Action Taken: Message routed to:  [facility] Neph    Travel Screening: Not Applicable

## 2022-08-03 NOTE — TELEPHONE ENCOUNTER
Reviewed with Dr. Major.  Would repeat labs (CMP, CBC, UAUC and Mag)  Orders placed.  Call to patient. She is aware and will get her labs drawn today likely.  Adali Méndez LPN  Nephrology  [phone]

## 2022-08-04 LAB
ALBUMIN SERPL-MCNC: 4.6 G/DL (ref 3.4–5)
ALP SERPL-CCNC: 79 U/L (ref 40–150)
ALT SERPL W P-5'-P-CCNC: 15 U/L (ref 0–50)
ANION GAP SERPL CALCULATED.3IONS-SCNC: 5 MMOL/L (ref 3–14)
AST SERPL W P-5'-P-CCNC: 13 U/L (ref 0–45)
BILIRUB SERPL-MCNC: 0.5 MG/DL (ref 0.2–1.3)
BUN SERPL-MCNC: 17 MG/DL (ref 7–30)
CALCIUM SERPL-MCNC: 9.7 MG/DL (ref 8.5–10.1)
CHLORIDE BLD-SCNC: 109 MMOL/L (ref 94–109)
CO2 SERPL-SCNC: 27 MMOL/L (ref 20–32)
CREAT SERPL-MCNC: 0.88 MG/DL (ref 0.52–1.04)
GFR SERPL CREATININE-BSD FRML MDRD: >90 ML/MIN/1.73M2
GLUCOSE BLD-MCNC: 91 MG/DL (ref 70–99)
MAGNESIUM SERPL-MCNC: 2.2 MG/DL (ref 1.6–2.3)
POTASSIUM BLD-SCNC: 4 MMOL/L (ref 3.4–5.3)
PROT SERPL-MCNC: 7.6 G/DL (ref 6.8–8.8)
SODIUM SERPL-SCNC: 141 MMOL/L (ref 133–144)

## 2022-08-05 LAB — BACTERIA UR CULT: NORMAL

## 2022-08-05 NOTE — TELEPHONE ENCOUNTER
Reviewed labs with Dr Major.  Creatinine, potassium and magnesium all within normal levels. No WBCs on UA, nitrites neg, UC showed <10,000 CFU/mL mixed urogenital gregory which may be a contaminated sample. Would tell patient to stay hydrated as her UA may indicate that she may be dehydrated. Would have her follow up with her prescribing provider on her birth control pills regarding her symptoms.  Call to patient.   Informed her of the recommendations per Dr Major as above.  No further questions or concerns.  Adali Méndez LPN  Nephrology  [phone]

## 2022-09-06 ENCOUNTER — TELEPHONE (OUTPATIENT)
Dept: NEPHROLOGY | Facility: CLINIC | Age: 27
End: 2022-09-06

## 2022-09-06 DIAGNOSIS — N18.2 CKD (CHRONIC KIDNEY DISEASE) STAGE 2, GFR 60-89 ML/MIN: Primary | ICD-10-CM

## 2022-09-06 NOTE — TELEPHONE ENCOUNTER
[facility] Call Center    Phone Message    May a detailed message be left on voicemail: yes     Reason for Call: Other: Pt is calling requesting to switch clinic locations due to commute. Please reach out to pt to discuss scheduling options. Thanks     Action Taken: Message routed to:  [facility] nephro    Travel Screening: Not Applicable

## 2022-09-07 ENCOUNTER — TELEPHONE (OUTPATIENT)
Dept: NEPHROLOGY | Facility: CLINIC | Age: 27
End: 2022-09-07

## 2022-09-07 NOTE — TELEPHONE ENCOUNTER
Call to patient to remind her of virtual visit with Dr Major on Monday September 12th and to get labs drawn ahead of time if possible.  Left voice message with patient regarding this message below.  Await to hear from patient  Adali Méndez LPN  Nephrology  [phone]

## 2022-09-07 NOTE — TELEPHONE ENCOUNTER
Patient called, she needs to cancel her appointment on Monday due to work conflict. She is wondering Dr Major's thoughts on urgency of appointment and also is needing to switch to [place] as this is closer for her commute.  Will send messages to [facility] and Dr Pedrito Méndez, [facility]  Nephrology  [phone]

## 2022-09-09 NOTE — TELEPHONE ENCOUNTER
Attempted to contact pt. No answer.  Message left on VM to return call.  Calling to assist pt with scheduling an appt with Nephrology at the [place] site.   Per Routing message from Dr. Ridley, pt can transfer to [place] Location.    Soonest availability is with Dr. Barrios, will offer to pt to schedule.    Michelle King LPN

## 2022-09-12 NOTE — TELEPHONE ENCOUNTER
Pt returned call. Assisted with scheduling an Appt with Dr. Barrios per pt request 10/11/22 at 9:00am  Lab Appt at 8:30am.    Pt had no further questions.    Michelle King LPN

## 2022-09-12 NOTE — TELEPHONE ENCOUNTER
2nd attempt to contact pt. No answer.  Message left to return call.    [employer] message sent to pt as well.    Michelle King LPN

## 2022-09-14 DIAGNOSIS — N18.2 CKD (CHRONIC KIDNEY DISEASE) STAGE 2, GFR 60-89 ML/MIN: Primary | ICD-10-CM

## 2022-09-26 ASSESSMENT — ENCOUNTER SYMPTOMS
HEARTBURN: 0
FREQUENCY: 0
HEADACHES: 0
SHORTNESS OF BREATH: 0
COUGH: 0
DYSURIA: 0
ABDOMINAL PAIN: 0
CONSTIPATION: 0
MYALGIAS: 0
FEVER: 0
CHILLS: 0
DIARRHEA: 0
WEAKNESS: 0
NERVOUS/ANXIOUS: 0
SORE THROAT: 0
PALPITATIONS: 0
JOINT SWELLING: 0
BREAST MASS: 0
ARTHRALGIAS: 0
HEMATURIA: 0
DIZZINESS: 0
NAUSEA: 0
PARESTHESIAS: 0
HEMATOCHEZIA: 0
EYE PAIN: 0

## 2022-09-29 DIAGNOSIS — I10 ESSENTIAL HYPERTENSION: ICD-10-CM

## 2022-09-29 DIAGNOSIS — N27.0 UNILATERAL SMALL KIDNEY: ICD-10-CM

## 2022-09-29 RX ORDER — AMLODIPINE BESYLATE 10 MG/1
TABLET ORAL
Qty: 90 TABLET | Refills: 3 | Status: SHIPPED | OUTPATIENT
Start: 2022-09-29 | End: 2022-10-03

## 2022-10-03 ENCOUNTER — OFFICE VISIT (OUTPATIENT)
Dept: FAMILY MEDICINE | Facility: OTHER | Age: 27
End: 2022-10-03
Payer: COMMERCIAL

## 2022-10-03 VITALS
WEIGHT: 119 LBS | TEMPERATURE: 98.7 F | DIASTOLIC BLOOD PRESSURE: 66 MMHG | SYSTOLIC BLOOD PRESSURE: 136 MMHG | HEIGHT: 67 IN | HEART RATE: 115 BPM | BODY MASS INDEX: 18.68 KG/M2 | OXYGEN SATURATION: 100 %

## 2022-10-03 DIAGNOSIS — Z13.220 SCREENING FOR HYPERLIPIDEMIA: ICD-10-CM

## 2022-10-03 DIAGNOSIS — Z00.00 ROUTINE GENERAL MEDICAL EXAMINATION AT A HEALTH CARE FACILITY: Primary | ICD-10-CM

## 2022-10-03 DIAGNOSIS — I15.9 SECONDARY HYPERTENSION: ICD-10-CM

## 2022-10-03 DIAGNOSIS — N18.2 CKD (CHRONIC KIDNEY DISEASE) STAGE 2, GFR 60-89 ML/MIN: ICD-10-CM

## 2022-10-03 DIAGNOSIS — Q60.5 HYPOPLASTIC KIDNEY: ICD-10-CM

## 2022-10-03 DIAGNOSIS — N92.6 MISSED MENSES: ICD-10-CM

## 2022-10-03 LAB — HCG UR QL: NEGATIVE

## 2022-10-03 PROCEDURE — 99385 PREV VISIT NEW AGE 18-39: CPT | Mod: 25 | Performed by: FAMILY MEDICINE

## 2022-10-03 PROCEDURE — 99213 OFFICE O/P EST LOW 20 MIN: CPT | Mod: 25 | Performed by: FAMILY MEDICINE

## 2022-10-03 PROCEDURE — 81025 URINE PREGNANCY TEST: CPT | Performed by: FAMILY MEDICINE

## 2022-10-03 PROCEDURE — 90471 IMMUNIZATION ADMIN: CPT | Performed by: FAMILY MEDICINE

## 2022-10-03 PROCEDURE — 90686 IIV4 VACC NO PRSV 0.5 ML IM: CPT | Performed by: FAMILY MEDICINE

## 2022-10-03 ASSESSMENT — ENCOUNTER SYMPTOMS
JOINT SWELLING: 0
NAUSEA: 0
WEAKNESS: 0
FREQUENCY: 0
HEMATOCHEZIA: 0
EYE PAIN: 0
NERVOUS/ANXIOUS: 0
FEVER: 0
CHILLS: 0
SHORTNESS OF BREATH: 0
HEADACHES: 0
PARESTHESIAS: 0
HEMATURIA: 0
DIZZINESS: 0
ARTHRALGIAS: 0
SORE THROAT: 0
MYALGIAS: 0
HEARTBURN: 0
ABDOMINAL PAIN: 0
DYSURIA: 0
BREAST MASS: 0
DIARRHEA: 0
CONSTIPATION: 0
PALPITATIONS: 0
COUGH: 0

## 2022-10-03 ASSESSMENT — PAIN SCALES - GENERAL: PAINLEVEL: NO PAIN (0)

## 2022-10-03 NOTE — PROGRESS NOTES
SUBJECTIVE:   CC: Charisma is an 27 year old who presents for preventive health visit.       Patient has been advised of split billing requirements and indicates understanding: Yes  Healthy Habits:     Getting at least 3 servings of Calcium per day:  Yes    Bi-annual eye exam:  Yes    Dental care twice a year:  NO    Sleep apnea or symptoms of sleep apnea:  None    Diet:  Regular (no restrictions)    Frequency of exercise:  1 day/week    Duration of exercise:  15-30 minutes    Taking medications regularly:  Yes    Medication side effects:  None    PHQ-2 Total Score: 0    Additional concerns today:  No      Today's PHQ-2 Score:   PHQ-2 ( 1999 [employer]) 9/26/2022   Q1: Little interest or pleasure in doing things 0   Q2: Feeling down, depressed or hopeless 0   PHQ-2 Score 0   PHQ-2 Total Score (12-17 Years)- Positive if 3 or more points; Administer PHQ-A if positive -   Q1: Little interest or pleasure in doing things Not at all   Q2: Feeling down, depressed or hopeless Not at all   PHQ-2 Score 0       Abuse: Current or Past (Physical, Sexual or Emotional) - No  Do you feel safe in your environment? Yes    Have you ever done Advance Care Planning? (For example, a Health Directive, POLST, or a discussion with a medical provider or your loved ones about your wishes): No, advance care planning information given to patient to review.  Advanced care planning was discussed at today's visit.    Social History     Tobacco Use     Smoking status: Never Smoker     Smokeless tobacco: Never Used   Substance Use Topics     Alcohol use: Yes         Alcohol Use 9/26/2022   Prescreen: >3 drinks/day or >7 drinks/week? No       Reviewed orders with patient.  Reviewed health maintenance and updated orders accordingly - Yes  Labs reviewed in EPIC    Breast Cancer Screening:    Breast CA Risk Assessment (FHS-7) 9/26/2022   Do you have a family history of breast, colon, or ovarian cancer? No / Unknown           Pertinent mammograms are  "reviewed under the imaging tab.    History of abnormal Pap smear: NO - age 21-29 PAP every 3 years recommended     Reviewed and updated as needed this visit by clinical staff   Tobacco  Allergies  Meds  Problems  Med Hx  Surg Hx  Fam Hx            Reviewed and updated as needed this visit by Provider   Tobacco  Allergies  Meds  Problems  Med Hx  Surg Hx  Fam Hx               Review of Systems   Constitutional: Negative for chills and fever.   HENT: Negative for congestion, ear pain, hearing loss and sore throat.    Eyes: Negative for pain and visual disturbance.   Respiratory: Negative for cough and shortness of breath.    Cardiovascular: Negative for chest pain, palpitations and peripheral edema.   Gastrointestinal: Negative for abdominal pain, constipation, diarrhea, heartburn, hematochezia and nausea.   Breasts:  Negative for tenderness, breast mass and discharge.   Genitourinary: Negative for dysuria, frequency, genital sores, hematuria, pelvic pain, urgency, vaginal bleeding and vaginal discharge.   Musculoskeletal: Negative for arthralgias, joint swelling and myalgias.   Skin: Negative for rash.   Neurological: Negative for dizziness, weakness, headaches and paresthesias.   Psychiatric/Behavioral: Negative for mood changes. The patient is not nervous/anxious.           OBJECTIVE:   /66   Pulse 115   Temp 98.7  F (37.1  C) (Oral)   Ht 1.714 m (5' 7.48\")   Wt 54 kg (119 lb)   LMP 08/25/2022 (Exact Date)   SpO2 100%   BMI 18.37 kg/m    Physical Exam  GENERAL: healthy, alert and no distress  EYES: Eyes grossly normal to inspection, PERRL and conjunctivae and sclerae normal  HENT: ear canals and TM's normal, nose and mouth without ulcers or lesions  NECK: no adenopathy, no asymmetry, masses, or scars and thyroid normal to palpation  RESP: lungs clear to auscultation - no rales, rhonchi or wheezes  BREAST: normal without masses, tenderness or nipple discharge and no palpable axillary " masses or adenopathy  CV: regular rate and rhythm, normal S1 S2, no S3 or S4, no murmur, click or rub, no peripheral edema and peripheral pulses strong  ABDOMEN: soft, nontender, no hepatosplenomegaly, no masses and bowel sounds normal  MS: no gross musculoskeletal defects noted, no edema  SKIN: no suspicious lesions or rashes  NEURO: Normal strength and tone, mentation intact and speech normal  PSYCH: mentation appears normal, affect normal/bright        ASSESSMENT/PLAN:       ICD-10-CM    1. Routine general medical examination at a health care facility  Z00.00 HCG Qual, Urine ([id number])     HCG Qual, Urine ([id number])   2. CKD (chronic kidney disease) stage 2, GFR 60-89 ml/min  N18.2    3. Secondary hypertension  I15.9    4. Hypoplastic kidney  Q60.5    5. Screening for hyperlipidemia  Z13.220 Lipid panel reflex to direct LDL Non-fasting   6. Screening for HIV (human immunodeficiency virus)  Z11.4    7. Need for hepatitis C screening test  Z11.59    8. Missed menses  N92.6 HCG Qual, Urine ([id number])     HCG Qual, Urine ([id number])     Discussed healthy eating nutrition and monitoring of her weight to maximize her medical health before she was to consider pregnancy.  She is already doing an actual family-planning and has missed her menstrual cycle so pregnancy test was done today.  Her pregnancy test was negative at this time we did inform her that we could consider a prenatal vitamin which would be advised for her at this time to prepare in case of pregnancy and that if she does get pregnant we need to maneuver her blood pressure medication so that she is no longer taking the enalapril and would be taking something more conducive with pregnancy like labetalol.  As she had significant proteinuria and nephrology is monitoring her kidneys we certainly can continue the enalapril now until she does get a positive pregnancy test.    Patient has been advised of split billing requirements and indicates understanding:  "Yes    COUNSELING:  Reviewed preventive health counseling, as reflected in patient instructions       Regular exercise       Healthy diet/nutrition    Estimated body mass index is 18.37 kg/m  as calculated from the following:    Height as of this encounter: 1.714 m (5' 7.48\").    Weight as of this encounter: 54 kg (119 lb).    Weight management plan noted, stable and monitoring    She reports that she has never smoked. She has never used smokeless tobacco.      Counseling Resources:  ATP IV Guidelines  Pooled Cohorts Equation Calculator  Breast Cancer Risk Calculator  BRCA-Related Cancer Risk Assessment: FHS-7 Tool  FRAX Risk Assessment  ICSI Preventive Guidelines  Dietary Guidelines for Americans, 2010  USDA's MyPlate  ASA Prophylaxis  Lung CA Screening    Yvette Cedeno MD, MD  [facility]  "

## 2022-10-11 ENCOUNTER — OFFICE VISIT (OUTPATIENT)
Dept: NEPHROLOGY | Facility: CLINIC | Age: 27
End: 2022-10-11
Payer: COMMERCIAL

## 2022-10-11 ENCOUNTER — LAB (OUTPATIENT)
Dept: LAB | Facility: CLINIC | Age: 27
End: 2022-10-11
Payer: COMMERCIAL

## 2022-10-11 VITALS
SYSTOLIC BLOOD PRESSURE: 120 MMHG | HEART RATE: 112 BPM | WEIGHT: 118.7 LBS | DIASTOLIC BLOOD PRESSURE: 82 MMHG | BODY MASS INDEX: 18.33 KG/M2 | OXYGEN SATURATION: 98 %

## 2022-10-11 DIAGNOSIS — Z13.220 SCREENING FOR HYPERLIPIDEMIA: ICD-10-CM

## 2022-10-11 DIAGNOSIS — N18.2 CKD (CHRONIC KIDNEY DISEASE) STAGE 2, GFR 60-89 ML/MIN: ICD-10-CM

## 2022-10-11 DIAGNOSIS — I15.1 HYPERTENSION SECONDARY TO OTHER RENAL DISORDERS: Primary | ICD-10-CM

## 2022-10-11 PROBLEM — I10 HTN (HYPERTENSION): Status: ACTIVE | Noted: 2017-07-16

## 2022-10-11 PROBLEM — N18.1 CKD (CHRONIC KIDNEY DISEASE) STAGE 1, GFR 90 ML/MIN OR GREATER: Status: ACTIVE | Noted: 2022-10-11

## 2022-10-11 LAB
ALBUMIN MFR UR ELPH: <6 MG/DL
ALBUMIN SERPL-MCNC: 4.5 G/DL (ref 3.4–5)
ALBUMIN UR-MCNC: NEGATIVE MG/DL
ANION GAP SERPL CALCULATED.3IONS-SCNC: 5 MMOL/L (ref 3–14)
APPEARANCE UR: CLEAR
BACTERIA #/AREA URNS HPF: ABNORMAL /HPF
BILIRUB UR QL STRIP: NEGATIVE
BUN SERPL-MCNC: 16 MG/DL (ref 7–30)
CALCIUM SERPL-MCNC: 9.5 MG/DL (ref 8.5–10.1)
CHLORIDE BLD-SCNC: 109 MMOL/L (ref 94–109)
CHOLEST SERPL-MCNC: 179 MG/DL
CO2 SERPL-SCNC: 27 MMOL/L (ref 20–32)
COLOR UR AUTO: COLORLESS
CREAT SERPL-MCNC: 0.9 MG/DL (ref 0.52–1.04)
CREAT UR-MCNC: 29.1 MG/DL
CREAT UR-MCNC: 30 MG/DL
DEPRECATED CALCIDIOL+CALCIFEROL SERPL-MC: 56 UG/L (ref 20–75)
ERYTHROCYTE [DISTWIDTH] IN BLOOD BY AUTOMATED COUNT: 11.6 % (ref 10–15)
FASTING STATUS PATIENT QL REPORTED: YES
GFR SERPL CREATININE-BSD FRML MDRD: 89 ML/MIN/1.73M2
GLUCOSE BLD-MCNC: 84 MG/DL (ref 70–99)
GLUCOSE UR STRIP-MCNC: NEGATIVE MG/DL
HCT VFR BLD AUTO: 43.5 % (ref 35–47)
HDLC SERPL-MCNC: 72 MG/DL
HGB BLD-MCNC: 14.7 G/DL (ref 11.7–15.7)
HGB UR QL STRIP: NEGATIVE
KETONES UR STRIP-MCNC: NEGATIVE MG/DL
LDLC SERPL CALC-MCNC: 97 MG/DL
LEUKOCYTE ESTERASE UR QL STRIP: ABNORMAL
MCH RBC QN AUTO: 30.4 PG (ref 26.5–33)
MCHC RBC AUTO-ENTMCNC: 33.8 G/DL (ref 31.5–36.5)
MCV RBC AUTO: 90 FL (ref 78–100)
MICROALBUMIN UR-MCNC: 25 MG/L
MICROALBUMIN/CREAT UR: 83.33 MG/G CR (ref 0–25)
NITRATE UR QL: NEGATIVE
NONHDLC SERPL-MCNC: 107 MG/DL
PH UR STRIP: 6 [PH] (ref 5–7)
PHOSPHATE SERPL-MCNC: 2.7 MG/DL (ref 2.5–4.5)
PLATELET # BLD AUTO: 239 10E3/UL (ref 150–450)
POTASSIUM BLD-SCNC: 3.9 MMOL/L (ref 3.4–5.3)
PROT/CREAT 24H UR: NORMAL MG/G{CREAT}
PTH-INTACT SERPL-MCNC: 17 PG/ML (ref 15–65)
RBC # BLD AUTO: 4.83 10E6/UL (ref 3.8–5.2)
RBC #/AREA URNS AUTO: ABNORMAL /HPF
SKIP: ABNORMAL
SODIUM SERPL-SCNC: 141 MMOL/L (ref 133–144)
SP GR UR STRIP: 1 (ref 1–1.03)
SQUAMOUS #/AREA URNS AUTO: ABNORMAL /LPF
TRIGL SERPL-MCNC: 51 MG/DL
UROBILINOGEN UR STRIP-MCNC: NORMAL MG/DL
WBC # BLD AUTO: 7.8 10E3/UL (ref 4–11)
WBC #/AREA URNS AUTO: ABNORMAL /HPF

## 2022-10-11 PROCEDURE — 80061 LIPID PANEL: CPT

## 2022-10-11 PROCEDURE — 82043 UR ALBUMIN QUANTITATIVE: CPT

## 2022-10-11 PROCEDURE — 84156 ASSAY OF PROTEIN URINE: CPT

## 2022-10-11 PROCEDURE — 83970 ASSAY OF PARATHORMONE: CPT

## 2022-10-11 PROCEDURE — 36415 COLL VENOUS BLD VENIPUNCTURE: CPT

## 2022-10-11 PROCEDURE — 99215 OFFICE O/P EST HI 40 MIN: CPT | Performed by: INTERNAL MEDICINE

## 2022-10-11 PROCEDURE — 81001 URINALYSIS AUTO W/SCOPE: CPT

## 2022-10-11 PROCEDURE — 82306 VITAMIN D 25 HYDROXY: CPT

## 2022-10-11 PROCEDURE — 85027 COMPLETE CBC AUTOMATED: CPT

## 2022-10-11 PROCEDURE — 80069 RENAL FUNCTION PANEL: CPT

## 2022-10-11 RX ORDER — NIFEDIPINE 30 MG
30 TABLET, EXTENDED RELEASE ORAL DAILY
Qty: 30 TABLET | Refills: 11 | Status: SHIPPED | OUTPATIENT
Start: 2022-10-11 | End: 2023-02-04

## 2022-10-11 ASSESSMENT — PAIN SCALES - GENERAL: PAINLEVEL: NO PAIN (0)

## 2022-10-11 NOTE — PROGRESS NOTES
I was asked to see this patient by Edel Rebollar    CC:  -CKD stage I A3  -HTN secondary to CKD    HPI:  Thank you for the referral. I had the pleasure today of seeing Charisma Valencia. She is a very pleasant 27 year old female  who presents for evaluation of the above.    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 She has been previously followed by Dr. Lorenzo Houston in the in the [facility].  She has a history of long-standing asymptomatic non-nephrotic range isolated proteinuria, hypertension and hypoplastic/dysplastic right kidney.  Her last renal ultrasound (May, 2016) revealed a right kidney of 6.3 cm (with little growth by comparison) and left kidney 11.6 cm. She was last seen by Dr. Major in adult nephrology via a video visit. At that time, her proteinuria has been worsening so she was changed to enalapril 10 mg daily instead of the amlodipine. She does not measure her blood pressure at home. She denies any personal or family history of kidney stones or CKD/dialysis/kidney transplant. Her prenatal history is unremarkable as reported. No frequent urinary tract infections during her childhood.    Overall, she feels well and is without medical complaints.  She denies gross hematuria, edema, dysuria or urinary tract infection. She denies recent fever, headache, dizziness, blurry vision, hearing difficulty, earache, sore throat, cough, shortness of breath, palpitation, abdominal pain, vomiting, abnormal bowel movement, skin rashes, joint pain/swelling or muscle weakness. The remainder of comprehensive review of systems was unremarkable.      She got  in July and has been off oral contraceptive pills. She is planning to get pregnant in 1 year. She had a urine pregnancy test done last week which was negative. Her last menstrual cycle was in August 25th.     She is a .     No Known  Allergies    norgestrel-ethinyl estradiol (LO/OVRAL) 0.3-30 MG-MCG tablet, Take 1 tablet by mouth    No current facility-administered medications on file prior to visit.      Mhx: HTN, CKD    History reviewed. No pertinent surgical history.    Social History     Tobacco Use     Smoking status: Never     Smokeless tobacco: Never   Substance Use Topics     Alcohol use: Yes     Drug use: Not Currently       History reviewed. No pertinent family history.    ROS: A 12 system review of systems was negative other than noted here or above.     Exam:  /82 (BP Location: Left arm, Patient Position: Sitting, Cuff Size: Adult Regular)   Pulse 112   Wt 53.8 kg (118 lb 11.2 oz)   LMP 08/25/2022 (Exact Date)   SpO2 98%   BMI 18.33 kg/m    EYES: PERRL  HENT: mouth without ulcers or lesions  NECK: supple, no adenopathy  RESP: lungs clear to auscultation - no rales, rhonchi or wheezes  CV: regular rhythm, normal rate, no rub   ABDOMEN:  soft, nontender, no HSM or masses and bowel sounds normal  MS: extremities normal- no gross deformities noted, no evidence of inflammation in joints, no muscle tenderness  SKIN: no rash  NEURO: Normal strength and tone, sensory exam grossly normal, mentation intact and speech normal  PSYCH: mentation appears normal. and affect normal/bright    Results:reviewed in details with the patient    ASSESSMENT AND RECOMMENDATIONS:   1. 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  Her renal function has been stable with a baseline Cr of ~0.9 mg/dL with an eGFR of 89 ml/min.  Of note, her iohexol in 2012 was 76 ml/min (when Cr was 0.9 mg/dL). Her UACR is down to 83.3mg/g. She has been off oral contraception for few months now  and planning to get pregnant in one year.    -will stop enalapril and start nifedipine instead  -she was asked to check her BP at home and keep a BP log; to see if the nifedipine dose need to be adjusted. Her ideal BP is 110-120/70-80  -Patient was also instructed  about the importance of BP control in controlling proteinuria    2. HTN: Likely secondary to CKD. She doesn't check her BP frequently at home but when she does, it is similar to her clinic value. Her BP in clinic today is 120/82.  Her antihypertensive was changed today to nifedipine 30 mg daily in anticipation of pregnancy.  -she was asked to check her BP at home and keep a BP log; to see if the nifedipine dose need to be adjusted. Her ideal BP is 110-120/70-80    3. Pregnancy and CKD counseling:  She is off oral contraceptive pills and planning to get pregnant in 1 year. She was counseled on the teratogenic complications of enalapril including fetal kidney malformations. Enalapril was stopped during this visit. She had a urine pregnancy test last week which was negative. Her last cycle was .  The risk of CKD progression with Pregnancy in early stages of CKD is low (<10%)[Shlomo 2015]. This risk in increased in the presence of uncontrolled HTN and proteinuria and thus it is very important to optimize those factors prior to pregnancy. She was also instructed about the increased risk of pre-eclampsia when compared with women without renal disease[10x higher]. For that reason, Aspirin  160 mg is recommended starting 12 weeks of gestation till 36 weeks [Katty 2017]. Calcium supplementation 1000mg is also recommended at least 3 months prior to conception. In addition, there is a slightly increased risk of  delivery and SGA babies.    The total time of this encounter amounted to 60 minutes on the day of the encounter 10/11/2022  This time included face to face time spent with the patient, preparatory work and chart review, ordering tests, and performing post visit documentation.    Barry Barrios MD   [facility]   Department of Medicine   Division of Renal Disease and Hypertension

## 2022-10-11 NOTE — PATIENT INSTRUCTIONS
It was a pleasure taking care of you today.  I've included a brief summary of our discussion and care plan from today's visit below.  Please review this information with your primary care provider.     My recommendations are summarized as follows:  -Please stop enalapril and start nifedipine  -Please chart your BP over the next 3 weeks and send it via a [employer]andal message to see if your dose need to be adjusted  -you BP should be around 110-120/70-80    Who do I call with any questions after my visit?  Please be in touch if there are any further questions that arise following today's visit.  There are multiple ways to contact your nephrology care team.       During business hours, you may reach your Nephrology Care Team or schedule or reschedule an appointment or lab at [phone].       If you need to schedule imaging, please call [phone].   To schedule a COVID test, please call [phone].     You can always send a secure message through Euclid. Euclid messages are answered by your nurse or doctor typically within 24-48 hours. Please allow extra time on weekends and holidays.       For urgent/emergent questions after business hours, you may reach the on-call Nephrology Fellow by contacting the [facility]  at [phone].     How will I get the results of any tests ordered?    You will receive all of your results.  If you have signed up for Euclid, any tests ordered at your visit will be available to you once resulted on [employer]. Typically the physician reviews them and may or may not make further recommendations. If there are urgent results that require a change in your care plan, your physician or nurse will call you to discuss the next steps. If you are not on MyChart, a letter may be generated and mailed to you with your results.

## 2022-10-11 NOTE — NURSING NOTE
Charisma Valencia's goals for this visit include: NONE  She requests these members of her care team be copied on today's visit information: YES    PCP: Edel Daily    Referring Provider:  No referring provider defined for this encounter.    /82 (BP Location: Left arm, Patient Position: Sitting, Cuff Size: Adult Regular)   Pulse 112   Wt 53.8 kg (118 lb 11.2 oz)   LMP 08/25/2022 (Exact Date)   SpO2 98%   BMI 18.33 kg/m      Do you need any medication refills at today's visit? NONE    Naren Chirinos, EMT

## 2022-12-08 ENCOUNTER — TELEPHONE (OUTPATIENT)
Dept: NEPHROLOGY | Facility: CLINIC | Age: 27
End: 2022-12-08

## 2022-12-08 DIAGNOSIS — N18.2 CKD (CHRONIC KIDNEY DISEASE) STAGE 2, GFR 60-89 ML/MIN: Primary | ICD-10-CM

## 2022-12-08 NOTE — TELEPHONE ENCOUNTER
[facility] Call Center    Phone Message    May a detailed message be left on voicemail: yes     Reason for Call: Order(s): Other:   Reason for requested: Labs   Date needed: 1/31/2023  Provider name: Dr. Barrios      Action Taken: Other: Nephrology     Travel Screening: Not Applicable

## 2023-01-12 ENCOUNTER — TELEPHONE (OUTPATIENT)
Dept: NEPHROLOGY | Facility: CLINIC | Age: 28
End: 2023-01-12

## 2023-01-12 NOTE — TELEPHONE ENCOUNTER
[facility] re: appontment change for 1/31/23 to 2/3/23- asked to call back [phone].    Naren Chirinos, EMT

## 2023-02-03 ENCOUNTER — OFFICE VISIT (OUTPATIENT)
Dept: NEPHROLOGY | Facility: CLINIC | Age: 28
End: 2023-02-03
Payer: COMMERCIAL

## 2023-02-03 ENCOUNTER — LAB (OUTPATIENT)
Dept: LAB | Facility: CLINIC | Age: 28
End: 2023-02-03
Payer: COMMERCIAL

## 2023-02-03 VITALS
SYSTOLIC BLOOD PRESSURE: 143 MMHG | HEART RATE: 97 BPM | WEIGHT: 122.8 LBS | DIASTOLIC BLOOD PRESSURE: 78 MMHG | RESPIRATION RATE: 18 BRPM | BODY MASS INDEX: 18.96 KG/M2 | OXYGEN SATURATION: 100 %

## 2023-02-03 DIAGNOSIS — N18.2 CKD (CHRONIC KIDNEY DISEASE) STAGE 2, GFR 60-89 ML/MIN: ICD-10-CM

## 2023-02-03 DIAGNOSIS — N06.0 ISOLATED PROTEINURIA WITH MINOR GLOMERULAR ABNORMALITY: ICD-10-CM

## 2023-02-03 DIAGNOSIS — R00.0 CHRONIC TACHYCARDIA: ICD-10-CM

## 2023-02-03 DIAGNOSIS — N06.0 ISOLATED PROTEINURIA WITH MINOR GLOMERULAR ABNORMALITY: Primary | ICD-10-CM

## 2023-02-03 LAB
ALBUMIN MFR UR ELPH: 48.9 MG/DL (ref 1–14)
ALBUMIN SERPL-MCNC: 4.3 G/DL (ref 3.4–5)
ALBUMIN UR-MCNC: 30 MG/DL
ANION GAP SERPL CALCULATED.3IONS-SCNC: 5 MMOL/L (ref 3–14)
APPEARANCE UR: CLEAR
BACTERIA #/AREA URNS HPF: ABNORMAL /HPF
BILIRUB UR QL STRIP: NEGATIVE
BUN SERPL-MCNC: 14 MG/DL (ref 7–30)
CALCIUM SERPL-MCNC: 9.3 MG/DL (ref 8.5–10.1)
CHLORIDE BLD-SCNC: 109 MMOL/L (ref 94–109)
CO2 SERPL-SCNC: 27 MMOL/L (ref 20–32)
COLOR UR AUTO: ABNORMAL
CREAT SERPL-MCNC: 0.75 MG/DL (ref 0.52–1.04)
CREAT UR-MCNC: 164 MG/DL
CREAT UR-MCNC: 166 MG/DL
GFR SERPL CREATININE-BSD FRML MDRD: >90 ML/MIN/1.73M2
GLUCOSE BLD-MCNC: 138 MG/DL (ref 70–99)
GLUCOSE UR STRIP-MCNC: NEGATIVE MG/DL
HGB BLD-MCNC: 13.6 G/DL (ref 11.7–15.7)
HGB UR QL STRIP: NEGATIVE
KETONES UR STRIP-MCNC: NEGATIVE MG/DL
LEUKOCYTE ESTERASE UR QL STRIP: NEGATIVE
MICROALBUMIN UR-MCNC: 255 MG/L
MICROALBUMIN/CREAT UR: 155.49 MG/G CR (ref 0–25)
NITRATE UR QL: NEGATIVE
PH UR STRIP: 5.5 [PH] (ref 5–7)
PHOSPHATE SERPL-MCNC: 2.7 MG/DL (ref 2.5–4.5)
POTASSIUM BLD-SCNC: 3.4 MMOL/L (ref 3.4–5.3)
PROT/CREAT 24H UR: 0.29 MG/MG CR (ref 0–0.2)
PTH-INTACT SERPL-MCNC: 15 PG/ML (ref 15–65)
RBC #/AREA URNS AUTO: ABNORMAL /HPF
SODIUM SERPL-SCNC: 141 MMOL/L (ref 133–144)
SP GR UR STRIP: 1.02 (ref 1–1.03)
SQUAMOUS #/AREA URNS AUTO: ABNORMAL /LPF
TSH SERPL DL<=0.005 MIU/L-ACNC: 0.61 MU/L (ref 0.4–4)
UROBILINOGEN UR STRIP-MCNC: NORMAL MG/DL
WBC #/AREA URNS AUTO: ABNORMAL /HPF

## 2023-02-03 PROCEDURE — 82570 ASSAY OF URINE CREATININE: CPT

## 2023-02-03 PROCEDURE — 84443 ASSAY THYROID STIM HORMONE: CPT

## 2023-02-03 PROCEDURE — 82043 UR ALBUMIN QUANTITATIVE: CPT

## 2023-02-03 PROCEDURE — 99214 OFFICE O/P EST MOD 30 MIN: CPT | Performed by: INTERNAL MEDICINE

## 2023-02-03 PROCEDURE — 80069 RENAL FUNCTION PANEL: CPT

## 2023-02-03 PROCEDURE — 83970 ASSAY OF PARATHORMONE: CPT

## 2023-02-03 PROCEDURE — 84156 ASSAY OF PROTEIN URINE: CPT

## 2023-02-03 PROCEDURE — 81001 URINALYSIS AUTO W/SCOPE: CPT

## 2023-02-03 PROCEDURE — 36415 COLL VENOUS BLD VENIPUNCTURE: CPT

## 2023-02-03 PROCEDURE — 85018 HEMOGLOBIN: CPT

## 2023-02-03 ASSESSMENT — PAIN SCALES - GENERAL: PAINLEVEL: NO PAIN (0)

## 2023-02-03 NOTE — NURSING NOTE
Charisma Mathew's goals for this visit include: NONE  She requests these members of her care team be copied on today's visit information: YES    PCP: Yvette Cedeno    Referring Provider:  No referring provider defined for this encounter.    BP (!) 143/78   Pulse 97   Resp 18   Wt 55.7 kg (122 lb 12.8 oz)   SpO2 100%   BMI 18.96 kg/m      Do you need any medication refills at today's visit? NONE    Naren Chirinos, EMT

## 2023-02-03 NOTE — PROGRESS NOTES
I was asked to see this patient by Edel Rebollar    CC:  -CKD stage I A3  -HTN secondary to CKD    HPI:  Charisma Valencia is a very pleasant 27 year old female  who presents for Follow-up on proteinuric CKD.    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 She has been previously followed by Dr. Lorenzo Houston in the in the [facility].  She has a history of long-standing asymptomatic non-nephrotic range isolated proteinuria, hypertension and hypoplastic/dysplastic right kidney.  Her last renal ultrasound (May, 2016) revealed a right kidney of 6.3 cm (with little growth by comparison) and left kidney 11.6 cm.     I last saw her in clinic in October 2022. At that time, she has expressed interest to get pregnant so I stopped her lisinopril and started her on nifedipine. Unfortunately, nifedipine caused her headache so she elected to stop it. Her BP at home off any antihypertensives has been 110-120/70-80; Her HR is at the upper end of normal ranging between . She does experience some palpitations at night but cannot tell whether it is rather related to anxiety.    She denies any personal or family history of kidney stones or CKD/dialysis/kidney transplant. Her prenatal history is unremarkable as reported. No frequent urinary tract infections during her childhood.    Overall, she feels well and is without medical complaints today.  She denies gross hematuria, edema, dysuria or urinary tract infection. She denies recent fever, headache, dizziness, blurry vision, hearing difficulty, earache, sore throat, cough, shortness of breath, palpitation, abdominal pain, vomiting, abnormal bowel movement, skin rashes, joint pain/swelling or muscle weakness. The remainder of comprehensive review of systems was unremarkable.      She is a .     No Known Allergies    NIFEdipine ER (ADALAT CC) 30 MG 24 hr tablet, Take  1 tablet (30 mg) by mouth daily (Patient not taking: Reported on 2/3/2023)    No current facility-administered medications on file prior to visit.    Fhx: HTN, CKD    No past surgical history on file.    Social History     Tobacco Use     Smoking status: Never     Smokeless tobacco: Never   Substance Use Topics     Alcohol use: Yes     Drug use: Not Currently     ROS: A 12 system review of systems was negative other than noted here or above.     Exam:  BP (!) 143/78   Pulse 97   Resp 18   Wt 55.7 kg (122 lb 12.8 oz)   SpO2 100%   BMI 18.96 kg/m    EYES: PERRL  HENT: mouth without ulcers or lesions  NECK: supple, no adenopathy  RESP: lungs clear to auscultation - no rales, rhonchi or wheezes  CV: regular rhythm, normal rate, no rub   ABDOMEN:  soft, nontender, no HSM or masses and bowel sounds normal  MS: extremities normal- no gross deformities noted, no evidence of inflammation in joints, no muscle tenderness  SKIN: no rash  NEURO: Normal strength and tone, sensory exam grossly normal, mentation intact and speech normal  PSYCH: mentation appears normal. and affect normal/bright    Results:reviewed in details with the patient    ASSESSMENT AND RECOMMENDATIONS:   1. 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  Her renal function has been stable with a baseline Cr of ~0.9 mg/dL with an eGFR of 89 ml/min, though her creatinine is slightly better today at 0.75.  Of note, her iohexol in 2012 was 76 ml/min (when Cr was 0.9 mg/dL). Her UACR is  155 mg/g and her UPCR is 290 mg/g.   She has been off oral contraception for few months now  and planning to get pregnant.   -Holding on any VIJAY blockade at this point.  -Patient was instructed about the importance of BP control in controlling proteinuria    2. HTN: Likely secondary to CKD. Her BP at home has been ranging around 110-120/70-80 off antihypertensive medications. Her HR is at the upper end of normal and we start a small dose labetalol, given the  increase in her albuminuria.    3. Pregnancy and CKD counseling:  She is off oral contraceptive pills and planning to get pregnant. She was counseled on the teratogenic complications of enalapril including fetal kidney malformations.   The risk of CKD progression with Pregnancy in early stages of CKD is low (<10%)[Shlomo 2015]. This risk in increased in the presence of uncontrolled HTN and proteinuria and thus it is very important to optimize those factors prior to pregnancy. She was also instructed about the increased risk of pre-eclampsia when compared with women without renal disease[10x higher]. For that reason, Aspirin  160 mg is recommended starting 12 weeks of gestation till 36 weeks [Katty 2017]. Calcium supplementation 1000mg is also recommended at least 3 months prior to conception. In addition, there is a slightly increased risk of  delivery and SGA babies.    The total time of this encounter amounted to 33 minutes on the day of the encounter 2/3/2023. This time included face to face time spent with the patient, preparatory work and chart review, ordering tests, and performing post visit documentation.    *This dictation was prepared in part using Dragon recognition software.  As a result errors may occur. When identified these transcription errors have been corrected.  While every attempt is made to correct errors during dictation, errors may still exist.     Barry Barrios MD   [facility]   Department of Medicine   Division of Renal Disease and Hypertension

## 2023-02-03 NOTE — PATIENT INSTRUCTIONS
It was a pleasure taking care of you today.  I've included a brief summary of our discussion and care plan from today's visit below.  Please review this information with your primary care provider.     My recommendations are summarized as follows:  -will follow up on the urine protein results and update you if we need to add any medications for that.  -You Blood pressure goal is 120/80    Who do I call with any questions after my visit?  Please be in touch if there are any further questions that arise following today's visit.  There are multiple ways to contact your nephrology care team.       During business hours, you may reach your Nephrology Care Team or schedule or reschedule an appointment or lab at [phone].       If you need to schedule imaging, please call [phone].   To schedule a COVID test, please call [phone].     You can always send a secure message through Powered Now. Powered Now messages are answered by your nurse or doctor typically within 24-48 hours. Please allow extra time on weekends and holidays.       For urgent/emergent questions after business hours, you may reach the on-call Nephrology Fellow by contacting the [facility]  at [phone].     How will I get the results of any tests ordered?    You will receive all of your results.  If you have signed up for Powered Now, any tests ordered at your visit will be available to you once resulted on VHTt. Typically the physician reviews them and may or may not make further recommendations. If there are urgent results that require a change in your care plan, your physician or nurse will call you to discuss the next steps. If you are not on Powered Now, a letter may be generated and mailed to you with your results.

## 2023-02-04 DIAGNOSIS — I12.9 HYPERTENSION, RENAL: Primary | ICD-10-CM

## 2023-02-04 RX ORDER — LABETALOL 100 MG/1
100 TABLET, FILM COATED ORAL 2 TIMES DAILY
Qty: 60 TABLET | Refills: 4 | Status: SHIPPED | OUTPATIENT
Start: 2023-02-04 | End: 2023-07-25

## 2023-02-19 ENCOUNTER — MYC MEDICAL ADVICE (OUTPATIENT)
Dept: FAMILY MEDICINE | Facility: OTHER | Age: 28
End: 2023-02-19
Payer: COMMERCIAL

## 2023-02-20 ENCOUNTER — VIRTUAL VISIT (OUTPATIENT)
Dept: FAMILY MEDICINE | Facility: CLINIC | Age: 28
End: 2023-02-20
Payer: COMMERCIAL

## 2023-02-20 DIAGNOSIS — Z34.00 SUPERVISION OF NORMAL FIRST PREGNANCY: Primary | ICD-10-CM

## 2023-02-20 PROCEDURE — 99207 PR NO CHARGE NURSE ONLY: CPT

## 2023-02-20 RX ORDER — PRENATAL VIT/IRON FUM/FOLIC AC 27MG-0.8MG
1 TABLET ORAL DAILY
COMMUNITY
End: 2024-04-02

## 2023-02-23 DIAGNOSIS — R80.9 PROTEINURIA: Primary | ICD-10-CM

## 2023-02-26 LAB
ABO/RH(D): NORMAL
ANTIBODY SCREEN: NEGATIVE
SPECIMEN EXPIRATION DATE: NORMAL

## 2023-02-27 ENCOUNTER — ANCILLARY PROCEDURE (OUTPATIENT)
Dept: ULTRASOUND IMAGING | Facility: OTHER | Age: 28
End: 2023-02-27
Attending: FAMILY MEDICINE
Payer: COMMERCIAL

## 2023-02-27 ENCOUNTER — LAB (OUTPATIENT)
Dept: LAB | Facility: OTHER | Age: 28
End: 2023-02-27
Payer: COMMERCIAL

## 2023-02-27 DIAGNOSIS — Z34.00 SUPERVISION OF NORMAL FIRST PREGNANCY: ICD-10-CM

## 2023-02-27 DIAGNOSIS — R80.9 PROTEINURIA: ICD-10-CM

## 2023-02-27 LAB
ALBUMIN UR-MCNC: NEGATIVE MG/DL
ANION GAP SERPL CALCULATED.3IONS-SCNC: 11 MMOL/L (ref 7–15)
APPEARANCE UR: CLEAR
BILIRUB UR QL STRIP: NEGATIVE
BUN SERPL-MCNC: 11.5 MG/DL (ref 6–20)
CALCIUM SERPL-MCNC: 9.3 MG/DL (ref 8.6–10)
CHLORIDE SERPL-SCNC: 102 MMOL/L (ref 98–107)
COLOR UR AUTO: YELLOW
CREAT SERPL-MCNC: 0.68 MG/DL (ref 0.51–0.95)
DEPRECATED HCO3 PLAS-SCNC: 24 MMOL/L (ref 22–29)
ERYTHROCYTE [DISTWIDTH] IN BLOOD BY AUTOMATED COUNT: 12.6 % (ref 10–15)
GFR SERPL CREATININE-BSD FRML MDRD: >90 ML/MIN/1.73M2
GLUCOSE SERPL-MCNC: 96 MG/DL (ref 70–99)
GLUCOSE UR STRIP-MCNC: NEGATIVE MG/DL
HCT VFR BLD AUTO: 40.6 % (ref 35–47)
HGB BLD-MCNC: 13.4 G/DL (ref 11.7–15.7)
HGB UR QL STRIP: NEGATIVE
KETONES UR STRIP-MCNC: NEGATIVE MG/DL
LEUKOCYTE ESTERASE UR QL STRIP: ABNORMAL
MCH RBC QN AUTO: 29.9 PG (ref 26.5–33)
MCHC RBC AUTO-ENTMCNC: 33 G/DL (ref 31.5–36.5)
MCV RBC AUTO: 91 FL (ref 78–100)
NITRATE UR QL: NEGATIVE
PH UR STRIP: 7 [PH] (ref 5–7)
PLATELET # BLD AUTO: 223 10E3/UL (ref 150–450)
POTASSIUM SERPL-SCNC: 4 MMOL/L (ref 3.4–5.3)
RBC # BLD AUTO: 4.48 10E6/UL (ref 3.8–5.2)
RBC #/AREA URNS AUTO: NORMAL /HPF
SODIUM SERPL-SCNC: 137 MMOL/L (ref 136–145)
SP GR UR STRIP: 1.01 (ref 1–1.03)
UROBILINOGEN UR STRIP-ACNC: 0.2 E.U./DL
WBC # BLD AUTO: 9.4 10E3/UL (ref 4–11)
WBC #/AREA URNS AUTO: NORMAL /HPF

## 2023-02-27 PROCEDURE — 80048 BASIC METABOLIC PNL TOTAL CA: CPT

## 2023-02-27 PROCEDURE — 87340 HEPATITIS B SURFACE AG IA: CPT

## 2023-02-27 PROCEDURE — 87086 URINE CULTURE/COLONY COUNT: CPT

## 2023-02-27 PROCEDURE — 86850 RBC ANTIBODY SCREEN: CPT

## 2023-02-27 PROCEDURE — 76801 OB US < 14 WKS SINGLE FETUS: CPT | Mod: TC | Performed by: RADIOLOGY

## 2023-02-27 PROCEDURE — 85027 COMPLETE CBC AUTOMATED: CPT

## 2023-02-27 PROCEDURE — 86803 HEPATITIS C AB TEST: CPT

## 2023-02-27 PROCEDURE — 86762 RUBELLA ANTIBODY: CPT

## 2023-02-27 PROCEDURE — 86901 BLOOD TYPING SEROLOGIC RH(D): CPT

## 2023-02-27 PROCEDURE — 82570 ASSAY OF URINE CREATININE: CPT

## 2023-02-27 PROCEDURE — 82043 UR ALBUMIN QUANTITATIVE: CPT

## 2023-02-27 PROCEDURE — 36415 COLL VENOUS BLD VENIPUNCTURE: CPT

## 2023-02-27 PROCEDURE — 86900 BLOOD TYPING SEROLOGIC ABO: CPT

## 2023-02-27 PROCEDURE — 81001 URINALYSIS AUTO W/SCOPE: CPT

## 2023-02-27 PROCEDURE — 87389 HIV-1 AG W/HIV-1&-2 AB AG IA: CPT

## 2023-02-27 PROCEDURE — 86780 TREPONEMA PALLIDUM: CPT

## 2023-02-28 LAB
CREAT UR-MCNC: 43.6 MG/DL
HBV SURFACE AG SERPL QL IA: NONREACTIVE
HCV AB SERPL QL IA: NONREACTIVE
HIV 1+2 AB+HIV1 P24 AG SERPL QL IA: NONREACTIVE
MICROALBUMIN UR-MCNC: <12 MG/L
MICROALBUMIN/CREAT UR: NORMAL MG/G{CREAT}
RUBV IGG SERPL QL IA: 1.77 INDEX
RUBV IGG SERPL QL IA: POSITIVE
T PALLIDUM AB SER QL: NONREACTIVE

## 2023-03-01 LAB — BACTERIA UR CULT: NO GROWTH

## 2023-03-13 ENCOUNTER — PRENATAL OFFICE VISIT (OUTPATIENT)
Dept: FAMILY MEDICINE | Facility: OTHER | Age: 28
End: 2023-03-13
Payer: COMMERCIAL

## 2023-03-13 VITALS
HEART RATE: 113 BPM | WEIGHT: 122 LBS | SYSTOLIC BLOOD PRESSURE: 106 MMHG | BODY MASS INDEX: 18.49 KG/M2 | RESPIRATION RATE: 16 BRPM | HEIGHT: 68 IN | DIASTOLIC BLOOD PRESSURE: 66 MMHG | OXYGEN SATURATION: 99 % | TEMPERATURE: 97.3 F

## 2023-03-13 DIAGNOSIS — R80.8 OTHER PROTEINURIA: ICD-10-CM

## 2023-03-13 DIAGNOSIS — Q60.5 HYPOPLASTIC KIDNEY: ICD-10-CM

## 2023-03-13 DIAGNOSIS — O09.529 SUPERVISION OF HIGH-RISK PREGNANCY OF ELDERLY MULTIGRAVIDA: Primary | ICD-10-CM

## 2023-03-13 PROCEDURE — 99207 PR FIRST OB VISIT: CPT | Performed by: FAMILY MEDICINE

## 2023-03-13 ASSESSMENT — PAIN SCALES - GENERAL: PAINLEVEL: NO PAIN (0)

## 2023-03-13 NOTE — PATIENT INSTRUCTIONS
Routine OB visits:   - Office visit every 4 weeks until 28 weeks gestation  - Office visit every 2 weeks from 28-36 weeks gestation  - Office visit weekly from 36 weeks until delivery  - Listen to baby's heart beat at each office visit after 10 weeks gestation  - Weight and blood pressure check at each visit    10 weeks or beyond  - Non Invasive Prenatal Screening - screening for Down's or other trisomies (optional)    20 weeks gestation  - Ultrasound -- checking for development on all of baby's body parts    24 weeks gestation  - 1 hour glucose tolerance test, a sugar drink to test for gestational diabetes    28 weeks gestation  - Rhogam injection for women who are Rh negative  - Tetanus with pertussis shot recommended 27-36 weeks      36 weeks gestation  - Vaginal/Rectal swab for Group B strep    First trimester  Let us know if you have any large amounts of bloody vaginal discharge

## 2023-03-13 NOTE — PROGRESS NOTES
"    SUBJECTIVE:     HPI:    This is a 27 year old female patient,  who presents for her first obstetrical visit.    EMILY: 10/1/2023, by Last Menstrual Period.  She is 11w1d weeks.  Her cycles are regular.  Her last menstrual period was normal.   Since her LMP, she has experienced  nausea, fatigue, vaginal discharge and urinary frequency).   She denies emesis, abdominal pain, headache, loss of appetite, dysuria, pelvic pain, urinary urgency, lightheadedness, vaginal bleeding, hemorrhoids and constipation.    Additional History: doing well, questions: rash, iron, contact    Have you travelled during the pregnancy?No  Have your sexual partner(s) travelled during the pregnancy?No      HISTORY:   Planned Pregnancy: Yes  Marital Status:   Occupation: teacher  Living in Household: Spouse    Past History:  Her past medical history   Past Medical History:   Diagnosis Date     CKD (chronic kidney disease) stage 1, GFR 90 ml/min or greater      HTN (hypertension)    .      She has a history of  none    Since her last LMP she denies use of alcohol, tobacco and street drugs.    Past medical, surgical, social and family history were reviewed and updated in [facility].        Current Outpatient Medications   Medication     labetalol (NORMODYNE) 100 MG tablet     Prenatal Vit-Fe Fumarate-FA (PRENATAL MULTIVITAMIN W/IRON) 27-0.8 MG tablet     No current facility-administered medications for this visit.       ROS:   12 point review of systems negative other than symptoms noted below or in the HPI.      OBJECTIVE:     EXAM:  /66   Pulse 113   Temp 97.3  F (36.3  C) (Temporal)   Resp 16   Ht 1.72 m (5' 7.72\")   Wt 55.3 kg (122 lb)   LMP 2022   SpO2 99%   BMI 18.71 kg/m   Body mass index is 18.71 kg/m .    GENERAL: healthy, alert and no distress  EYES: Eyes grossly normal to inspection, PERRL and conjunctivae and sclerae normal  HENT: ear canals and TM's normal, nose and mouth without ulcers or lesions  NECK: no " adenopathy, no asymmetry, masses, or scars and thyroid normal to palpation  RESP: lungs clear to auscultation - no rales, rhonchi or wheezes  BREAST: normal without masses, tenderness or nipple discharge and no palpable axillary masses or adenopathy  CV: regular rate and rhythm, normal S1 S2, no S3 or S4, no murmur, click or rub, no peripheral edema and peripheral pulses strong  ABDOMEN: soft, nontender, no hepatosplenomegaly, no masses and bowel sounds normal  MS: no gross musculoskeletal defects noted, no edema  SKIN: no suspicious lesions or rashes  NEURO: Normal strength and tone, mentation intact and speech normal  PSYCH: mentation appears normal, affect normal/bright    ASSESSMENT/PLAN:       ICD-10-CM    1. Supervision of high-risk pregnancy of elderly multigravida  O09.529       2. Hypoplastic kidney  Q60.5       3. Other proteinuria  R80.8           27 year old , 11w1d weeks of pregnancy with EMILY of 10/1/2023, by Last Menstrual Period    Discussed as follows:Proteinuria - chronic and needs extra growth US and will address and order as we proceed. Otherwise can schedule routine visits.  Due to preeclampsia risks with the chronic HTN, will need aspirin at 12 weeks, ordered and discussed to start next week.  Has not decided on delivery location.  Is not sure on NIPT yet, will consider.  US needed for FHTs today which were reassuring.    Counseling given:   - Follow up in 4-6 weeks for return OB visit.  - Recommended weight gain for pregnancy: 15-25 lbs.         PLAN/PATIENT INSTRUCTIONS:    Routine OB visits:   - Office visit every 4 weeks until 28 weeks gestation  - Office visit every 2 weeks from 28-36 weeks gestation  - Office visit weekly from 36 weeks until delivery  - Listen to baby's heart beat at each office visit after 10 weeks gestation  - Weight and blood pressure check at each visit    10 weeks or beyond  - Non Invasive Prenatal Screening - screening for Down's or other trisomies  (optional)    20 weeks gestation  - Ultrasound -- checking for development on all of baby's body parts    24 weeks gestation  EXTRA US likely for growth  - 1 hour glucose tolerance test, a sugar drink to test for gestational diabetes    28 weeks gestation  - Rhogam injection for women who are Rh negative  - Tetanus with pertussis shot recommended 27-36 weeks      36 weeks gestation  - Vaginal/Rectal swab for Group B strep    First trimester  Let us know if you have any large amounts of bloody vaginal discharge       Yvette Cedeno MD, MD

## 2023-03-21 ENCOUNTER — OFFICE VISIT (OUTPATIENT)
Dept: NEPHROLOGY | Facility: CLINIC | Age: 28
End: 2023-03-21
Payer: COMMERCIAL

## 2023-03-21 VITALS
SYSTOLIC BLOOD PRESSURE: 143 MMHG | WEIGHT: 121.6 LBS | DIASTOLIC BLOOD PRESSURE: 80 MMHG | HEART RATE: 97 BPM | BODY MASS INDEX: 18.64 KG/M2 | RESPIRATION RATE: 18 BRPM

## 2023-03-21 DIAGNOSIS — Z32.01 PREGNANCY TEST POSITIVE: Primary | ICD-10-CM

## 2023-03-21 PROCEDURE — 99214 OFFICE O/P EST MOD 30 MIN: CPT | Performed by: INTERNAL MEDICINE

## 2023-03-21 RX ORDER — CALCIUM CARBONATE 500(1250)
1 TABLET ORAL 2 TIMES DAILY
Qty: 60 TABLET | Refills: 11 | Status: SHIPPED | OUTPATIENT
Start: 2023-03-21 | End: 2023-09-25

## 2023-03-21 ASSESSMENT — PAIN SCALES - GENERAL: PAINLEVEL: NO PAIN (0)

## 2023-03-21 NOTE — PROGRESS NOTES
I was asked to see this patient by Edel Rebollar    CC:  -CKD stage I A3  -HTN secondary to CKD  -Pregnancy    HPI:  Charisma Valencia is a very pleasant 27 year old female  who presents for Follow-up on proteinuric CKD. She is now 12 weeks+2 pregnant. Her due date is 2023. 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 She has been previously followed by Dr. Lorenzo Houston in the [facility].  She has a history of long-standing asymptomatic non-nephrotic range isolated proteinuria, hypertension and hypoplastic/dysplastic right kidney.  Her last renal ultrasound (May, 2016) revealed a right kidney of 6.3 cm (with little growth by comparison) and left kidney 11.6 cm.     She denies any personal or family history of kidney stones or CKD/dialysis/kidney transplant. Her prenatal history is unremarkable as reported. No frequent urinary tract infections during her childhood.    In 2022, she has expressed interest to get pregnant so I stopped her lisinopril and started her on nifedipine. Unfortunately, nifedipine caused her headache so she elected to stop it. During her last visit in , her BP was elevated and she was slightly tachycardic so she was started on labetalol 100 mg twice daily. Currently her BP is ranging between 110-115/63-72. Her HR has been 75-85/min.    Overall, she feels well. No morning thickness, energy is good.  She denies gross hematuria, edema, dysuria or urinary tract infection. She denies recent fever, headache, dizziness, blurry vision, hearing difficulty, earache, sore throat, cough, shortness of breath, palpitation, abdominal pain, vomiting, abnormal bowel movement, skin rashes, joint pain/swelling or muscle weakness. The remainder of comprehensive review of systems was unremarkable.      She is a .     No Known Allergies    labetalol  (NORMODYNE) 100 MG tablet, Take 1 tablet (100 mg) by mouth 2 times daily  Prenatal Vit-Fe Fumarate-FA (PRENATAL MULTIVITAMIN W/IRON) 27-0.8 MG tablet, Take 1 tablet by mouth daily  aspirin (ASA) 81 MG EC tablet, Take 1 tablet (81 mg) by mouth daily (Patient not taking: Reported on 3/21/2023)    No current facility-administered medications on file prior to visit.    Fhx: HTN, CKD    Past Surgical History:   Procedure Laterality Date     WISDOM TOOTH EXTRACTION         Social History     Tobacco Use     Smoking status: Never     Smokeless tobacco: Never   Vaping Use     Vaping Use: Never used   Substance Use Topics     Alcohol use: Not Currently     Drug use: Never     ROS: A 12 system review of systems was negative other than noted here or above.     Exam:  BP (!) 143/80   Pulse 97   Resp 18   Wt 55.2 kg (121 lb 9.6 oz)   LMP 12/25/2022   BMI 18.64 kg/m    EYES: PERRL  HENT: mouth without ulcers or lesions  NECK: supple, no adenopathy  RESP: lungs clear to auscultation - no rales, rhonchi or wheezes  CV: regular rhythm, normal rate, no rub   ABDOMEN:  soft, nontender, no HSM or masses and bowel sounds normal  MS: extremities normal- no gross deformities noted, no evidence of inflammation in joints, no muscle tenderness  SKIN: no rash  NEURO: Normal strength and tone, sensory exam grossly normal, mentation intact and speech normal  PSYCH: mentation appears normal. and affect normal/bright    Results:reviewed in details with the patient    ASSESSMENT AND RECOMMENDATIONS:   1. 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  Her creatinine has slightly decreased which is an expected dilutional effect with pregnancy.  Of note, her iohexol in 2012 was 76 ml/min (when Cr was 0.9 mg/dL). Her most recent urine albumin is very low and cannot be measured.    -Holding on any VIJAY blockade at this point given pregnancy  -Was started on labetalol with good BP and proteinuria control  -Ok to start aspirin to  lower her risk of pre-eclampsia    2. Counseling on CKD during pregnancy:  The risk of CKD progression with Pregnancy in early stages of CKD is low (<10%)[Shlomo 2015]. This risk in increased in the presence of uncontrolled HTN and proteinuria and thus it is very important to optimize those factors prior to pregnancy. She was also instructed about the increased risk of pre-eclampsia when compared with women without renal disease[10x higher]. For that reason, Aspirin  mg is recommended starting 12 weeks of gestation till 36 weeks [Katty 2017]. Calcium supplementation 1000mg is also recommended at least 3 months prior to conception. In addition, there is a slightly increased risk of  delivery and SGA babies.    3. HTN: Likely secondary to CKD. Her BP at home has been ranging around 110-115/70-80      Follow-up in 6 weeks.    The total time of this encounter amounted to 33 minutes on the day of the encounter 2/3/2023. This time included face to face time spent with the patient, preparatory work and chart review, ordering tests, and performing post visit documentation.    *This dictation was prepared in part using Dragon recognition software.  As a result errors may occur. When identified these transcription errors have been corrected.  While every attempt is made to correct errors during dictation, errors may still exist.     Barry Barrios MD   [facility]   Department of Medicine   Division of Renal Disease and Hypertension

## 2023-03-21 NOTE — PATIENT INSTRUCTIONS
It was a pleasure taking care of you today.  I've included a brief summary of our discussion and care plan from today's visit below.  Please review this information with your primary care provider.     My recommendations are summarized as follows:  -can start aspirin which is good for pre-eclampsia prophylaxis  -Keep checking blood pressure at home    Who do I call with any questions after my visit?  Please be in touch if there are any further questions that arise following today's visit.  There are multiple ways to contact your nephrology care team.       During business hours, you may reach your Nephrology Care Team or schedule or reschedule an appointment or lab at [phone].       If you need to schedule imaging, please call [phone].   To schedule a COVID test, please call [phone].     You can always send a secure message through HireArt. HireArt messages are answered by your nurse or doctor typically within 24-48 hours. Please allow extra time on weekends and holidays.       For urgent/emergent questions after business hours, you may reach the on-call Nephrology Fellow by contacting the [facility]  at [phone].     How will I get the results of any tests ordered?    You will receive all of your results.  If you have signed up for HireArt, any tests ordered at your visit will be available to you once resulted on Travelatust. Typically the physician reviews them and may or may not make further recommendations. If there are urgent results that require a change in your care plan, your physician or nurse will call you to discuss the next steps. If you are not on NetWitnesshart, a letter may be generated and mailed to you with your results.

## 2023-03-21 NOTE — NURSING NOTE
Charisma Mathew's goals for this visit include: pregnancy pregnancy complications  She requests these members of her care team be copied on today's visit information: yes    PCP: Yvette Cedeno    Referring Provider:  No referring provider defined for this encounter.    BP (!) 143/80   Pulse 97   Resp 18   Wt 55.2 kg (121 lb 9.6 oz)   LMP 12/25/2022   BMI 18.64 kg/m      Do you need any medication refills at today's visit? None    Naren Chirinos, EMT

## 2023-04-10 ENCOUNTER — PRENATAL OFFICE VISIT (OUTPATIENT)
Dept: FAMILY MEDICINE | Facility: OTHER | Age: 28
End: 2023-04-10
Payer: COMMERCIAL

## 2023-04-10 VITALS
RESPIRATION RATE: 20 BRPM | TEMPERATURE: 98.1 F | HEART RATE: 102 BPM | OXYGEN SATURATION: 97 % | DIASTOLIC BLOOD PRESSURE: 64 MMHG | SYSTOLIC BLOOD PRESSURE: 104 MMHG | WEIGHT: 126 LBS | BODY MASS INDEX: 19.1 KG/M2 | HEIGHT: 68 IN

## 2023-04-10 DIAGNOSIS — Q60.5 HYPOPLASTIC KIDNEY: ICD-10-CM

## 2023-04-10 DIAGNOSIS — O09.529 SUPERVISION OF HIGH-RISK PREGNANCY OF ELDERLY MULTIGRAVIDA: Primary | ICD-10-CM

## 2023-04-10 DIAGNOSIS — I12.9 HYPERTENSION, RENAL: ICD-10-CM

## 2023-04-10 DIAGNOSIS — R80.8 OTHER PROTEINURIA: ICD-10-CM

## 2023-04-10 PROCEDURE — 99207 PR PRENATAL VISIT: CPT | Performed by: FAMILY MEDICINE

## 2023-04-10 ASSESSMENT — PAIN SCALES - GENERAL: PAINLEVEL: NO PAIN (0)

## 2023-04-10 NOTE — PROGRESS NOTES
15w1d  Patient reports feeling great. Usually tired. Just getting over a cold.  Discussed quad screen and she does not desire.  No to COVID, [place] [facility]  Tdap planned in 3rd tri  Will schedule anatomy ultrasound.  RTC 4 weeks.  Yvette Cedeno MD

## 2023-04-15 DIAGNOSIS — I12.9 HYPERTENSION, RENAL: ICD-10-CM

## 2023-04-24 ENCOUNTER — MYC MEDICAL ADVICE (OUTPATIENT)
Dept: FAMILY MEDICINE | Facility: OTHER | Age: 28
End: 2023-04-24
Payer: COMMERCIAL

## 2023-05-02 ENCOUNTER — OFFICE VISIT (OUTPATIENT)
Dept: NEPHROLOGY | Facility: CLINIC | Age: 28
End: 2023-05-02
Payer: COMMERCIAL

## 2023-05-02 ENCOUNTER — APPOINTMENT (OUTPATIENT)
Dept: LAB | Facility: CLINIC | Age: 28
End: 2023-05-02
Payer: COMMERCIAL

## 2023-05-02 VITALS
RESPIRATION RATE: 16 BRPM | HEART RATE: 80 BPM | OXYGEN SATURATION: 99 % | SYSTOLIC BLOOD PRESSURE: 124 MMHG | BODY MASS INDEX: 20.16 KG/M2 | WEIGHT: 130.7 LBS | DIASTOLIC BLOOD PRESSURE: 76 MMHG

## 2023-05-02 DIAGNOSIS — N06.0 ISOLATED PROTEINURIA WITH MINOR GLOMERULAR ABNORMALITY: Primary | ICD-10-CM

## 2023-05-02 PROCEDURE — 99214 OFFICE O/P EST MOD 30 MIN: CPT | Performed by: INTERNAL MEDICINE

## 2023-05-02 ASSESSMENT — PAIN SCALES - GENERAL: PAINLEVEL: MILD PAIN (2)

## 2023-05-02 NOTE — NURSING NOTE
Charisma Mathew's goals for this visit include: NONE  She requests these members of her care team be copied on today's visit information: YES    PCP: Yvette Cedeno    Referring Provider:  No referring provider defined for this encounter.    /76 (BP Location: Left arm, Patient Position: Sitting, Cuff Size: Adult Regular)   Pulse 80   Resp 16   Wt 59.3 kg (130 lb 11.2 oz)   LMP 12/25/2022   SpO2 99%   BMI 20.16 kg/m      Do you need any medication refills at today's visit? NONE    Naren Chirinos, EMT

## 2023-05-02 NOTE — PROGRESS NOTES
I was asked to see this patient by Edel Rebollar    CC:  -CKD stage I A3  -HTN secondary to CKD  -Pregnancy    HPI:  Charisma Valencia is a very pleasant 27 year old female  who presents for Follow-up on proteinuric CKD. She is now 18 weeks+2 pregnant. Her due date is 2023. 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 She has been previously followed by Dr. Lorenzo Houston in the in [facility].  She has a history of long-standing asymptomatic non-nephrotic range isolated proteinuria, hypertension and hypoplastic/dysplastic right kidney.  Her last renal ultrasound (May, 2016) revealed a right kidney of 6.3 cm (with little growth by comparison) and left kidney 11.6 cm.     She denies any personal or family history of kidney stones or CKD/dialysis/kidney transplant. Her prenatal history is unremarkable as reported. No frequent urinary tract infections during her childhood.    In 2022, she has expressed interest to get pregnant so I stopped her lisinopril and started her on nifedipine. Unfortunately, nifedipine caused her headache so she elected to stop it. During her last visit in , her BP was elevated and she was slightly tachycardic so she was started on labetalol 100 mg twice daily. Currently her BP is ranging between BP at home 105-120/58-77. HR 70-90.    Overall, she feels well.She has gained 8 pounds since March. No morning thickness, energy is good.  She denies gross hematuria, edema, dysuria or urinary tract infection. She denies recent fever, headache, dizziness, blurry vision, hearing difficulty, earache, sore throat, cough, shortness of breath, palpitation, abdominal pain, vomiting, abnormal bowel movement, skin rashes, joint pain/swelling or muscle weakness. The remainder of comprehensive review of systems was unremarkable.      She is a .      Wt Readings from Last 4 Encounters:   05/02/23 59.3 kg (130 lb 11.2 oz)   04/10/23 57.2 kg (126 lb)   03/21/23 55.2 kg (121 lb 9.6 oz)   03/13/23 55.3 kg (122 lb)     No Known Allergies    aspirin (ASA) 81 MG EC tablet, Take 1 tablet (81 mg) by mouth daily  calcium carbonate (OS-MERCY) 500 MG tablet, Take 1 tablet (500 mg) by mouth 2 times daily  labetalol (NORMODYNE) 100 MG tablet, Take 1 tablet (100 mg) by mouth 2 times daily  Prenatal Vit-Fe Fumarate-FA (PRENATAL MULTIVITAMIN W/IRON) 27-0.8 MG tablet, Take 1 tablet by mouth daily    No current facility-administered medications on file prior to visit.    Fhx: HTN, CKD    Past Surgical History:   Procedure Laterality Date     WISDOM TOOTH EXTRACTION         Social History     Tobacco Use     Smoking status: Never     Smokeless tobacco: Never   Vaping Use     Vaping status: Never Used   Substance Use Topics     Alcohol use: Not Currently     Drug use: Never     ROS: A 12 system review of systems was negative other than noted here or above.     Exam:  /76 (BP Location: Left arm, Patient Position: Sitting, Cuff Size: Adult Regular)   Pulse 80   Resp 16   Wt 59.3 kg (130 lb 11.2 oz)   LMP 12/25/2022   SpO2 99%   BMI 20.16 kg/m    EYES: PERRL  HENT: mouth without ulcers or lesions  NECK: supple, no adenopathy  RESP: lungs clear to auscultation - no rales, rhonchi or wheezes  CV: regular rhythm, normal rate, no rub   ABDOMEN:  soft, nontender, no HSM or masses and bowel sounds normal  MS: extremities normal- no gross deformities noted, no evidence of inflammation in joints, no muscle tenderness  SKIN: no rash  NEURO: Normal strength and tone, sensory exam grossly normal, mentation intact and speech normal  PSYCH: mentation appears normal. and affect normal/bright    Results:reviewed in details with the patient    ASSESSMENT AND RECOMMENDATIONS:   1. 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  Her creatinine has slightly decreased which  is an expected dilutional effect with pregnancy.  Of note, her iohexol in  was 76 ml/min (when Cr was 0.9 mg/dL). Her most recent urine albumin is very low and cannot be measured.  Will repeat Urine studies today.  -Holding on any VIJAY blockade at this point given pregnancy  -Was started on labetalol with good BP and proteinuria control  -On aspirin and calcium to lower her risk of pre-eclampsia    2. Counseling on CKD during pregnancy:  The risk of CKD progression with Pregnancy in early stages of CKD is low (<10%)[Shlomo 2015]. This risk in increased in the presence of uncontrolled HTN and proteinuria and thus it is very important to optimize those factors prior to pregnancy. She was also instructed about the increased risk of pre-eclampsia when compared with women without renal disease [10x higher]. For that reason, Aspirin  mg is recommended starting 12 weeks of gestation till 36 weeks [Katty 2017]. Calcium supplementation 1000mg is also recommended at least 3 months prior to conception. In addition, there is a slightly increased risk of  delivery and SGA babies.    3. HTN: Likely secondary to CKD. Her BP at home has been ranging around 105-120/58-77.      Follow-up in 10 weeks.    The total time of this encounter amounted to 20 minutes on the day of the encounter 2023. This time included face to face time spent with the patient, preparatory work and chart review, ordering tests, and performing post visit documentation.    *This dictation was prepared in part using Dragon recognition software.  As a result errors may occur. When identified these transcription errors have been corrected.  While every attempt is made to correct errors during dictation, errors may still exist.     Barry Barrios MD   [facility]   Department of Medicine   Division of Renal Disease and Hypertension

## 2023-05-02 NOTE — PATIENT INSTRUCTIONS
It was a pleasure taking care of you today.  I've included a brief summary of our discussion and care plan from today's visit below.  Please review this information with your primary care provider.     My recommendations are summarized as follows:  -Please do urine tests with your next appointment    Who do I call with any questions after my visit?  Please be in touch if there are any further questions that arise following today's visit.  There are multiple ways to contact your nephrology care team.       During business hours, you may reach your Nephrology Care Team or schedule or reschedule an appointment or lab at [phone].       If you need to schedule imaging, please call [phone].   To schedule a COVID test, please call [phone].     You can always send a secure message through Wellcentive. Wellcentive messages are answered by your nurse or doctor typically within 24-48 hours. Please allow extra time on weekends and holidays.       For urgent/emergent questions after business hours, you may reach the on-call Nephrology Fellow by contacting the [facility]  at [phone].     How will I get the results of any tests ordered?    You will receive all of your results.  If you have signed up for Wellcentive, any tests ordered at your visit will be available to you once resulted on Metrilust. Typically the physician reviews them and may or may not make further recommendations. If there are urgent results that require a change in your care plan, your physician or nurse will call you to discuss the next steps. If you are not on isockethart, a letter may be generated and mailed to you with your results.

## 2023-05-08 ENCOUNTER — PRENATAL OFFICE VISIT (OUTPATIENT)
Dept: FAMILY MEDICINE | Facility: OTHER | Age: 28
End: 2023-05-08
Payer: COMMERCIAL

## 2023-05-08 VITALS
HEIGHT: 68 IN | TEMPERATURE: 97.9 F | BODY MASS INDEX: 20.31 KG/M2 | SYSTOLIC BLOOD PRESSURE: 108 MMHG | HEART RATE: 92 BPM | OXYGEN SATURATION: 99 % | RESPIRATION RATE: 16 BRPM | WEIGHT: 134 LBS | DIASTOLIC BLOOD PRESSURE: 64 MMHG

## 2023-05-08 DIAGNOSIS — Q60.5 HYPOPLASTIC KIDNEY: ICD-10-CM

## 2023-05-08 DIAGNOSIS — O10.919 CHRONIC HYPERTENSION AFFECTING PREGNANCY: ICD-10-CM

## 2023-05-08 DIAGNOSIS — N06.0 ISOLATED PROTEINURIA WITH MINOR GLOMERULAR ABNORMALITY: ICD-10-CM

## 2023-05-08 DIAGNOSIS — O09.529 SUPERVISION OF HIGH-RISK PREGNANCY OF ELDERLY MULTIGRAVIDA: Primary | ICD-10-CM

## 2023-05-08 LAB
ALBUMIN MFR UR ELPH: 6.8 MG/DL (ref 1–14)
ALBUMIN UR-MCNC: NEGATIVE MG/DL
APPEARANCE UR: CLEAR
BILIRUB UR QL STRIP: NEGATIVE
COLOR UR AUTO: YELLOW
CREAT UR-MCNC: 100.1 MG/DL
GLUCOSE UR STRIP-MCNC: 100 MG/DL
HGB UR QL STRIP: NEGATIVE
KETONES UR STRIP-MCNC: NEGATIVE MG/DL
LEUKOCYTE ESTERASE UR QL STRIP: NEGATIVE
NITRATE UR QL: NEGATIVE
PH UR STRIP: 6 [PH] (ref 5–7)
PROT/CREAT 24H UR: 0.07 MG/MG CR (ref 0–0.2)
SP GR UR STRIP: 1.02 (ref 1–1.03)
UROBILINOGEN UR STRIP-ACNC: 0.2 E.U./DL

## 2023-05-08 PROCEDURE — 99207 PR COMPLICATED OB VISIT: CPT | Performed by: FAMILY MEDICINE

## 2023-05-08 PROCEDURE — 84156 ASSAY OF PROTEIN URINE: CPT | Performed by: FAMILY MEDICINE

## 2023-05-08 PROCEDURE — 81003 URINALYSIS AUTO W/O SCOPE: CPT | Performed by: FAMILY MEDICINE

## 2023-05-08 ASSESSMENT — PAIN SCALES - GENERAL: PAINLEVEL: NO PAIN (0)

## 2023-05-08 NOTE — PROGRESS NOTES
Concerns today: no concerns. She reports she is doing well. Fatigue has improved. Her rashes on her hand are not spreading. She states that they are doing fine. She reports that she did have a rash on the back of her knee, but it was not spreading. She denies any vaginal bleeding or any discomfort. She has a blood pressure monitor at home that she does check.     Recommended moisturizer to help with rash. When she is hot and sweaty, she has the potential to lose more fluid. Blood pressure discussed today. At the recommendation of her nephrologist, labs ordered due to history of proteinuria.  No nausea/vomiting. No heartburn.  No vaginal bleeding, no contractions/severe cramping, no leakage of fluid.  No vaginal discharge. No dysuria  No headache, vision changes, lower extremity swelling, upper abdominal pain, chest pain, shortness of breath.   Discussed kick counts and fetal movement.      I, Wade Monteiro, am serving as a scribe; to document services personally performed by Dr. Yvette Cedeno MD based on data collection and the provider's statements to me.     Provider Disclosure:  I agree with above History, Review of Systems, Physical exam and Plan.  I have reviewed the content of the documentation and have edited it as needed. I have personally performed the services documented here and the documentation accurately represents those services and the decisions I have made.      Electronically signed by:    Yvette Cedeno MD, MD

## 2023-05-12 ENCOUNTER — ANCILLARY PROCEDURE (OUTPATIENT)
Dept: ULTRASOUND IMAGING | Facility: OTHER | Age: 28
End: 2023-05-12
Attending: FAMILY MEDICINE
Payer: COMMERCIAL

## 2023-05-12 DIAGNOSIS — O09.529 SUPERVISION OF HIGH-RISK PREGNANCY OF ELDERLY MULTIGRAVIDA: ICD-10-CM

## 2023-05-12 PROCEDURE — 76805 OB US >/= 14 WKS SNGL FETUS: CPT | Mod: TC | Performed by: RADIOLOGY

## 2023-06-05 ENCOUNTER — PRENATAL OFFICE VISIT (OUTPATIENT)
Dept: FAMILY MEDICINE | Facility: OTHER | Age: 28
End: 2023-06-05
Payer: COMMERCIAL

## 2023-06-05 VITALS
TEMPERATURE: 97.7 F | OXYGEN SATURATION: 98 % | RESPIRATION RATE: 16 BRPM | BODY MASS INDEX: 21.13 KG/M2 | WEIGHT: 137 LBS | DIASTOLIC BLOOD PRESSURE: 60 MMHG | SYSTOLIC BLOOD PRESSURE: 104 MMHG | HEART RATE: 93 BPM

## 2023-06-05 DIAGNOSIS — N18.1 CKD (CHRONIC KIDNEY DISEASE) STAGE 1, GFR 90 ML/MIN OR GREATER: ICD-10-CM

## 2023-06-05 DIAGNOSIS — O10.919 CHRONIC HYPERTENSION AFFECTING PREGNANCY: ICD-10-CM

## 2023-06-05 DIAGNOSIS — O09.529 SUPERVISION OF HIGH-RISK PREGNANCY OF ELDERLY MULTIGRAVIDA: Primary | ICD-10-CM

## 2023-06-05 DIAGNOSIS — N06.0 ISOLATED PROTEINURIA WITH MINOR GLOMERULAR ABNORMALITY: ICD-10-CM

## 2023-06-05 DIAGNOSIS — Q60.5 HYPOPLASTIC KIDNEY: ICD-10-CM

## 2023-06-05 PROCEDURE — 99207 PR COMPLICATED OB VISIT: CPT | Performed by: FAMILY MEDICINE

## 2023-06-05 ASSESSMENT — PAIN SCALES - GENERAL: PAINLEVEL: NO PAIN (0)

## 2023-06-05 NOTE — PROGRESS NOTES
Concerns today: no concerns or questions today  No nausea/vomiting. No heartburn.  No vaginal bleeding, no contractions/severe cramping, no leakage of fluid.  No vaginal discharge. No dysuria  No headache, vision changes, lower extremity swelling, upper abdominal pain, chest pain, shortness of breath.   BP well controlled, following nephrology and last urine had no proteinuria.  Growth US planned soon, and monthly throughout pregnancy  Not finding out gender, but will want circ if a boy.      Yvette Cedeno MD, MD

## 2023-06-16 ENCOUNTER — ANCILLARY PROCEDURE (OUTPATIENT)
Dept: ULTRASOUND IMAGING | Facility: OTHER | Age: 28
End: 2023-06-16
Attending: FAMILY MEDICINE
Payer: COMMERCIAL

## 2023-06-16 DIAGNOSIS — O09.529 SUPERVISION OF HIGH-RISK PREGNANCY OF ELDERLY MULTIGRAVIDA: ICD-10-CM

## 2023-06-16 DIAGNOSIS — O10.919 CHRONIC HYPERTENSION AFFECTING PREGNANCY: ICD-10-CM

## 2023-06-16 PROCEDURE — 76816 OB US FOLLOW-UP PER FETUS: CPT | Mod: TC | Performed by: RADIOLOGY

## 2023-07-05 ENCOUNTER — PRENATAL OFFICE VISIT (OUTPATIENT)
Dept: FAMILY MEDICINE | Facility: OTHER | Age: 28
End: 2023-07-05
Payer: COMMERCIAL

## 2023-07-05 VITALS
DIASTOLIC BLOOD PRESSURE: 68 MMHG | OXYGEN SATURATION: 98 % | HEART RATE: 74 BPM | TEMPERATURE: 98.1 F | RESPIRATION RATE: 16 BRPM | SYSTOLIC BLOOD PRESSURE: 110 MMHG | WEIGHT: 142 LBS | BODY MASS INDEX: 21.9 KG/M2

## 2023-07-05 DIAGNOSIS — N06.0 ISOLATED PROTEINURIA WITH MINOR GLOMERULAR ABNORMALITY: ICD-10-CM

## 2023-07-05 DIAGNOSIS — O09.892 SUPERVISION OF OTHER HIGH RISK PREGNANCIES, SECOND TRIMESTER: Primary | ICD-10-CM

## 2023-07-05 DIAGNOSIS — O10.919 CHRONIC HYPERTENSION AFFECTING PREGNANCY: ICD-10-CM

## 2023-07-05 DIAGNOSIS — Q60.5 HYPOPLASTIC KIDNEY: ICD-10-CM

## 2023-07-05 DIAGNOSIS — O09.529 SUPERVISION OF HIGH-RISK PREGNANCY OF ELDERLY MULTIGRAVIDA: ICD-10-CM

## 2023-07-05 DIAGNOSIS — N18.1 CKD (CHRONIC KIDNEY DISEASE) STAGE 1, GFR 90 ML/MIN OR GREATER: ICD-10-CM

## 2023-07-05 LAB
GLUCOSE 1H P 50 G GLC PO SERPL-MCNC: 149 MG/DL (ref 70–129)
HGB BLD-MCNC: 11.4 G/DL (ref 11.7–15.7)

## 2023-07-05 PROCEDURE — 82950 GLUCOSE TEST: CPT | Performed by: FAMILY MEDICINE

## 2023-07-05 PROCEDURE — 99207 PR PRENATAL VISIT: CPT | Performed by: FAMILY MEDICINE

## 2023-07-05 PROCEDURE — 36415 COLL VENOUS BLD VENIPUNCTURE: CPT | Performed by: FAMILY MEDICINE

## 2023-07-05 PROCEDURE — 86780 TREPONEMA PALLIDUM: CPT | Performed by: FAMILY MEDICINE

## 2023-07-05 ASSESSMENT — PAIN SCALES - GENERAL: PAINLEVEL: NO PAIN (0)

## 2023-07-05 NOTE — PROGRESS NOTES
Concerns: no questions or concerns  No vaginal bleeding, no contractions, no leakage of fluid  No nausea/vomiting. No heartburn  No vaginal discharge. No dysuria.   No headache, vision changes, lower extremity swelling, upper abdominal pain, chest pain, shortness of breath  Tdap planned next visit  Discussed PTL, PROM, and when to call or come in.  Normal anatomy ultrasound.  RTC 4 weeks.  GTT and labs today       Yvette Cedeno MD, MD

## 2023-07-06 LAB — T PALLIDUM AB SER QL: NONREACTIVE

## 2023-07-11 ENCOUNTER — LAB (OUTPATIENT)
Dept: LAB | Facility: OTHER | Age: 28
End: 2023-07-11
Payer: COMMERCIAL

## 2023-07-11 DIAGNOSIS — O09.892 SUPERVISION OF OTHER HIGH RISK PREGNANCIES, SECOND TRIMESTER: ICD-10-CM

## 2023-07-11 LAB
GESTATIONAL GTT 1 HR POST DOSE: 165 MG/DL (ref 60–179)
GESTATIONAL GTT 2 HR POST DOSE: 136 MG/DL (ref 60–154)
GESTATIONAL GTT 3 HR POST DOSE: 98 MG/DL (ref 60–139)
GLUCOSE P FAST SERPL-MCNC: 104 MG/DL (ref 60–94)

## 2023-07-11 PROCEDURE — 36415 COLL VENOUS BLD VENIPUNCTURE: CPT

## 2023-07-11 PROCEDURE — 82951 GLUCOSE TOLERANCE TEST (GTT): CPT

## 2023-07-11 PROCEDURE — 82952 GTT-ADDED SAMPLES: CPT

## 2023-07-14 RX ORDER — LABETALOL 100 MG/1
TABLET, FILM COATED ORAL
Qty: 180 TABLET | Refills: 2 | OUTPATIENT
Start: 2023-07-14

## 2023-07-17 DIAGNOSIS — I12.9 HYPERTENSION, RENAL: ICD-10-CM

## 2023-07-24 ENCOUNTER — TELEPHONE (OUTPATIENT)
Dept: NEPHROLOGY | Facility: CLINIC | Age: 28
End: 2023-07-24

## 2023-07-24 ENCOUNTER — PRENATAL OFFICE VISIT (OUTPATIENT)
Dept: FAMILY MEDICINE | Facility: OTHER | Age: 28
End: 2023-07-24
Payer: COMMERCIAL

## 2023-07-24 VITALS
BODY MASS INDEX: 22.21 KG/M2 | OXYGEN SATURATION: 98 % | WEIGHT: 144 LBS | TEMPERATURE: 98.1 F | DIASTOLIC BLOOD PRESSURE: 68 MMHG | RESPIRATION RATE: 16 BRPM | SYSTOLIC BLOOD PRESSURE: 114 MMHG | HEART RATE: 95 BPM

## 2023-07-24 DIAGNOSIS — R80.9 PROTEINURIA: Primary | ICD-10-CM

## 2023-07-24 DIAGNOSIS — O10.919 CHRONIC HYPERTENSION AFFECTING PREGNANCY: ICD-10-CM

## 2023-07-24 DIAGNOSIS — O09.893 SUPERVISION OF OTHER HIGH RISK PREGNANCIES, THIRD TRIMESTER: Primary | ICD-10-CM

## 2023-07-24 DIAGNOSIS — N18.1 CKD (CHRONIC KIDNEY DISEASE) STAGE 1, GFR 90 ML/MIN OR GREATER: ICD-10-CM

## 2023-07-24 PROCEDURE — 90715 TDAP VACCINE 7 YRS/> IM: CPT | Performed by: FAMILY MEDICINE

## 2023-07-24 PROCEDURE — 90471 IMMUNIZATION ADMIN: CPT | Performed by: FAMILY MEDICINE

## 2023-07-24 PROCEDURE — 99207 PR PRENATAL VISIT: CPT | Performed by: FAMILY MEDICINE

## 2023-07-24 ASSESSMENT — PAIN SCALES - GENERAL: PAINLEVEL: NO PAIN (0)

## 2023-07-24 NOTE — TELEPHONE ENCOUNTER
Attempted to contact pt.  No answer.  Message left.  Calling to inform pt Dr. Barrios recommends labs prior to her Appt in clinic 7/25/23 at 2:30pm.  This LPN did schedule a Lab appt. 7/25/23 at 2:00pm at [facility].  Recommended pt call if further questions or concerns.    Michelle King LPN

## 2023-07-24 NOTE — PROGRESS NOTES
Concerns: None   Doing well.  No concerns today.  No contractions.  Discussed kick counts and fetal movement.  Discussed PTL, PROM, and when to call or come in.  Due to chronic HTN, will need growth US and not yet set up. This will be ordered. BPP will also need to be completed weekly starting at 32 weeks.  RTC 2 weeks.    Yvette Cedeno MD, MD

## 2023-07-25 ENCOUNTER — LAB (OUTPATIENT)
Dept: LAB | Facility: CLINIC | Age: 28
End: 2023-07-25
Payer: COMMERCIAL

## 2023-07-25 ENCOUNTER — OFFICE VISIT (OUTPATIENT)
Dept: NEPHROLOGY | Facility: CLINIC | Age: 28
End: 2023-07-25
Attending: INTERNAL MEDICINE
Payer: COMMERCIAL

## 2023-07-25 VITALS
OXYGEN SATURATION: 98 % | WEIGHT: 144 LBS | HEART RATE: 77 BPM | DIASTOLIC BLOOD PRESSURE: 79 MMHG | RESPIRATION RATE: 16 BRPM | SYSTOLIC BLOOD PRESSURE: 117 MMHG | BODY MASS INDEX: 22.21 KG/M2

## 2023-07-25 DIAGNOSIS — R80.9 PROTEINURIA: ICD-10-CM

## 2023-07-25 DIAGNOSIS — I12.9 HYPERTENSION, RENAL: ICD-10-CM

## 2023-07-25 LAB
ALBUMIN MFR UR ELPH: 7.4 MG/DL
ALBUMIN UR-MCNC: NEGATIVE MG/DL
ANION GAP SERPL CALCULATED.3IONS-SCNC: 10 MMOL/L (ref 7–15)
APPEARANCE UR: CLEAR
BACTERIA #/AREA URNS HPF: ABNORMAL /HPF
BILIRUB UR QL STRIP: NEGATIVE
BUN SERPL-MCNC: 10.4 MG/DL (ref 6–20)
CALCIUM SERPL-MCNC: 9.3 MG/DL (ref 8.6–10)
CHLORIDE SERPL-SCNC: 106 MMOL/L (ref 98–107)
COLOR UR AUTO: ABNORMAL
CREAT SERPL-MCNC: 0.83 MG/DL (ref 0.51–0.95)
CREAT UR-MCNC: 93.7 MG/DL
CREAT UR-MCNC: 95 MG/DL
DEPRECATED HCO3 PLAS-SCNC: 22 MMOL/L (ref 22–29)
ERYTHROCYTE [DISTWIDTH] IN BLOOD BY AUTOMATED COUNT: 11.9 % (ref 10–15)
GFR SERPL CREATININE-BSD FRML MDRD: >90 ML/MIN/1.73M2
GLUCOSE SERPL-MCNC: 97 MG/DL (ref 70–99)
GLUCOSE UR STRIP-MCNC: NEGATIVE MG/DL
HCT VFR BLD AUTO: 34.6 % (ref 35–47)
HGB BLD-MCNC: 11.6 G/DL (ref 11.7–15.7)
HGB UR QL STRIP: NEGATIVE
KETONES UR STRIP-MCNC: NEGATIVE MG/DL
LEUKOCYTE ESTERASE UR QL STRIP: ABNORMAL
MCH RBC QN AUTO: 30.6 PG (ref 26.5–33)
MCHC RBC AUTO-ENTMCNC: 33.5 G/DL (ref 31.5–36.5)
MCV RBC AUTO: 91 FL (ref 78–100)
MICROALBUMIN UR-MCNC: <12 MG/L
MICROALBUMIN/CREAT UR: NORMAL MG/G{CREAT}
NITRATE UR QL: NEGATIVE
PH UR STRIP: 6 [PH] (ref 5–7)
PLATELET # BLD AUTO: 183 10E3/UL (ref 150–450)
POTASSIUM SERPL-SCNC: 3.9 MMOL/L (ref 3.4–5.3)
PROT/CREAT 24H UR: 0.08 MG/MG CR (ref 0–0.2)
PTH-INTACT SERPL-MCNC: 12 PG/ML (ref 15–65)
RBC # BLD AUTO: 3.79 10E6/UL (ref 3.8–5.2)
RBC #/AREA URNS AUTO: ABNORMAL /HPF
SKIP: ABNORMAL
SODIUM SERPL-SCNC: 138 MMOL/L (ref 136–145)
SP GR UR STRIP: 1.01 (ref 1–1.03)
UROBILINOGEN UR STRIP-MCNC: NORMAL MG/DL
WBC # BLD AUTO: 13.8 10E3/UL (ref 4–11)
WBC #/AREA URNS AUTO: ABNORMAL /HPF

## 2023-07-25 PROCEDURE — 84156 ASSAY OF PROTEIN URINE: CPT

## 2023-07-25 PROCEDURE — 81001 URINALYSIS AUTO W/SCOPE: CPT

## 2023-07-25 PROCEDURE — 83970 ASSAY OF PARATHORMONE: CPT

## 2023-07-25 PROCEDURE — 80048 BASIC METABOLIC PNL TOTAL CA: CPT

## 2023-07-25 PROCEDURE — 85027 COMPLETE CBC AUTOMATED: CPT

## 2023-07-25 PROCEDURE — 82043 UR ALBUMIN QUANTITATIVE: CPT

## 2023-07-25 PROCEDURE — 36415 COLL VENOUS BLD VENIPUNCTURE: CPT

## 2023-07-25 PROCEDURE — 99213 OFFICE O/P EST LOW 20 MIN: CPT | Performed by: INTERNAL MEDICINE

## 2023-07-25 PROCEDURE — 82570 ASSAY OF URINE CREATININE: CPT

## 2023-07-25 RX ORDER — LABETALOL 100 MG/1
100 TABLET, FILM COATED ORAL 2 TIMES DAILY
Qty: 120 TABLET | Refills: 4 | Status: SHIPPED | OUTPATIENT
Start: 2023-07-25 | End: 2023-09-25

## 2023-07-25 ASSESSMENT — PAIN SCALES - GENERAL: PAINLEVEL: NO PAIN (0)

## 2023-07-25 NOTE — NURSING NOTE
Charisma Mathew's goals for this visit include: NONE  She requests these members of her care team be copied on today's visit information: YES    PCP: Yvette Cedeno    Referring Provider:  Referred Self, MD  No address on file    /79 (BP Location: Left arm, Patient Position: Sitting, Cuff Size: Adult Regular)   Pulse 77   Resp 16   Wt 65.3 kg (144 lb)   LMP 12/25/2022   SpO2 98%   BMI 22.21 kg/m      Do you need any medication refills at today's visit? NONE    Naren Chirinos, EMT

## 2023-07-25 NOTE — PATIENT INSTRUCTIONS
It was a pleasure taking care of you today.  I've included a brief summary of our discussion and care plan from today's visit below.  Please review this information with your primary care provider.     My recommendations are summarized as follows  I am glad things are smooth.     Who do I call with any questions after my visit?  Please be in touch if there are any further questions that arise following today's visit.  There are multiple ways to contact your nephrology care team.       During business hours, you may reach your Nephrology Care Team or schedule or reschedule an appointment or lab at [phone].       If you need to schedule imaging, please call [phone].   To schedule a COVID test, please call [phone].     You can always send a secure message through Orchestra Networks. Orchestra Networks messages are answered by your nurse or doctor typically within 24-48 hours. Please allow extra time on weekends and holidays.       For urgent/emergent questions after business hours, you may reach the on-call Nephrology Fellow by contacting the [facility]  at [phone].     How will I get the results of any tests ordered?    You will receive all of your results.  If you have signed up for Orchestra Networks, any tests ordered at your visit will be available to you once resulted on Orchestra Networks. Typically the physician reviews them and may or may not make further recommendations. If there are urgent results that require a change in your care plan, your physician or nurse will call you to discuss the next steps. If you are not on [employer]ett, a letter may be generated and mailed to you with your results.

## 2023-07-25 NOTE — PROGRESS NOTES
I was asked to see this patient by Edel Rebollar    CC:  -CKD stage I A2  -HTN secondary to CKD  -Pregnancy 30 weeks+2 days    HPI:  Charisma Valencia is a very pleasant 27 year old female  who presents for Follow-up on proteinuric CKD. She is now 30 weeks+2 pregnant. Her due date is 2023. 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 She has been previously followed by Dr. Lorenzo Houston in the in the [facility].  She has a history of long-standing asymptomatic non-nephrotic range isolated proteinuria, hypertension and hypoplastic/dysplastic right kidney.  Her last renal ultrasound (May, 2016) revealed a right kidney of 6.3 cm (with little growth by comparison) and left kidney 11.6 cm.     She denies any personal or family history of kidney stones or CKD/dialysis/kidney transplant. Her prenatal history is unremarkable as reported. No frequent urinary tract infections during her childhood.    In 2022, she has expressed interest to get pregnant so I stopped her lisinopril and started her on nifedipine. Unfortunately, nifedipine caused her headache so she elected to stop it. During her last visit in , her BP was elevated and she was slightly tachycardic so she was started on labetalol 100 mg twice daily. Currently her BP is ranging between BP at home 107-114/-77. HR 70-80.    Overall, she feels well. Weight gain is appropriate. No morning thickness, energy is good.  She denies gross hematuria, edema, dysuria or urinary tract infection. She denies recent fever, headache, dizziness, blurry vision, hearing difficulty, earache, sore throat, cough, shortness of breath, palpitation, abdominal pain, vomiting, abnormal bowel movement, skin rashes, joint pain/swelling or muscle weakness. The remainder of comprehensive review of systems was unremarkable.      She is a .    She doesnot want to know the sex of the baby.    Wt Readings from Last 4 Encounters:   07/25/23 65.3 kg (144 lb)   07/24/23 65.3 kg (144 lb)   07/05/23 64.4 kg (142 lb)   06/05/23 62.1 kg (137 lb)     Wt Readings from Last 4 Encounters:   07/25/23 65.3 kg (144 lb)   07/24/23 65.3 kg (144 lb)   07/05/23 64.4 kg (142 lb)   06/05/23 62.1 kg (137 lb)     No Known Allergies    aspirin (ASA) 81 MG EC tablet, Take 1 tablet (81 mg) by mouth daily  calcium carbonate (OS-MERCY) 500 MG tablet, Take 1 tablet (500 mg) by mouth 2 times daily  labetalol (NORMODYNE) 100 MG tablet, Take 1 tablet (100 mg) by mouth 2 times daily  Prenatal Vit-Fe Fumarate-FA (PRENATAL MULTIVITAMIN W/IRON) 27-0.8 MG tablet, Take 1 tablet by mouth daily    No current facility-administered medications on file prior to visit.    Fhx: HTN, CKD    Past Surgical History:   Procedure Laterality Date    WISDOM TOOTH EXTRACTION         Social History     Tobacco Use    Smoking status: Never    Smokeless tobacco: Never   Vaping Use    Vaping Use: Never used   Substance Use Topics    Alcohol use: Not Currently    Drug use: Never     ROS: A 12 system review of systems was negative other than noted here or above.     Exam:  /79 (BP Location: Left arm, Patient Position: Sitting, Cuff Size: Adult Regular)   Pulse 77   Resp 16   Wt 65.3 kg (144 lb)   LMP 12/25/2022   SpO2 98%   BMI 22.21 kg/m    EYES: PERRL  HENT: mouth without ulcers or lesions  NECK: supple, no adenopathy  RESP: lungs clear to auscultation - no rales, rhonchi or wheezes  CV: regular rhythm, normal rate, no rub   ABDOMEN:  soft, nontender, no HSM or masses and bowel sounds normal  MS: extremities normal- no gross deformities noted, no evidence of inflammation in joints, no muscle tenderness  SKIN: no rash  NEURO: Normal strength and tone, sensory exam grossly normal, mentation intact and speech normal  PSYCH: mentation appears normal. and affect normal/bright  No LE  edema    Results:reviewed in details with the patient    ASSESSMENT AND RECOMMENDATIONS:   1. 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  Her creatinine has slightly decreased which is an expected dilutional effect with pregnancy.  Of note, her iohexol in  was 76 ml/min (when Cr was 0.9 mg/dL). No evidence of proteinuria.   -Holding on any VIJAY blockade at this point given pregnancy  -Continue labetalol with good BP and proteinuria control  -On aspirin and calcium to lower her risk of pre-eclampsia    2. Counseling on CKD during pregnancy:  The risk of CKD progression with Pregnancy in early stages of CKD is low (<10%)[Shlomo 2015]. This risk in increased in the presence of uncontrolled HTN and proteinuria and thus it is very important to optimize those factors prior to pregnancy. She was also instructed about the increased risk of pre-eclampsia when compared with women without renal disease [10x higher]. For that reason, Aspirin  mg is recommended starting 12 weeks of gestation till 36 weeks [Katty 2017]. Calcium supplementation 1000mg is also recommended at least 3 months prior to conception. In addition, there is a slightly increased risk of  delivery and SGA babies.  Nonetheless, her risk is low given well controlled BP and absence of proteinuria.    3. HTN: Likely secondary to CKD. Her BP at home has been ranging around 110-120/70-80     Follow-up in 6 weeks[ when she is 36 weeks]    The total time of this encounter amounted to 20 minutes on the day of the encounter 2023. This time included face to face time spent with the patient, preparatory work and chart review, ordering tests, and performing post visit documentation.    *This dictation was prepared in part using Dragon recognition software.  As a result errors may occur. When identified these transcription errors have been corrected.  While every attempt is made to correct errors during dictation, errors may still  exist.     Barry Barrios MD   [facility]   Department of Medicine   Division of Renal Disease and Hypertension

## 2023-07-31 ENCOUNTER — ANCILLARY PROCEDURE (OUTPATIENT)
Dept: ULTRASOUND IMAGING | Facility: OTHER | Age: 28
End: 2023-07-31
Attending: FAMILY MEDICINE
Payer: COMMERCIAL

## 2023-07-31 DIAGNOSIS — O10.919 CHRONIC HYPERTENSION AFFECTING PREGNANCY: ICD-10-CM

## 2023-07-31 DIAGNOSIS — O09.893 SUPERVISION OF OTHER HIGH RISK PREGNANCIES, THIRD TRIMESTER: ICD-10-CM

## 2023-07-31 DIAGNOSIS — N18.1 CKD (CHRONIC KIDNEY DISEASE) STAGE 1, GFR 90 ML/MIN OR GREATER: ICD-10-CM

## 2023-07-31 PROCEDURE — 76816 OB US FOLLOW-UP PER FETUS: CPT | Mod: TC | Performed by: RADIOLOGY

## 2023-07-31 PROCEDURE — 76819 FETAL BIOPHYS PROFIL W/O NST: CPT | Mod: TC | Performed by: RADIOLOGY

## 2023-08-07 ENCOUNTER — TELEPHONE (OUTPATIENT)
Dept: FAMILY MEDICINE | Facility: OTHER | Age: 28
End: 2023-08-07

## 2023-08-07 ENCOUNTER — ANCILLARY PROCEDURE (OUTPATIENT)
Dept: ULTRASOUND IMAGING | Facility: OTHER | Age: 28
End: 2023-08-07
Attending: FAMILY MEDICINE
Payer: COMMERCIAL

## 2023-08-07 ENCOUNTER — PRENATAL OFFICE VISIT (OUTPATIENT)
Dept: FAMILY MEDICINE | Facility: OTHER | Age: 28
End: 2023-08-07
Payer: COMMERCIAL

## 2023-08-07 VITALS
WEIGHT: 144 LBS | OXYGEN SATURATION: 98 % | TEMPERATURE: 98.1 F | BODY MASS INDEX: 21.82 KG/M2 | RESPIRATION RATE: 24 BRPM | HEART RATE: 100 BPM | DIASTOLIC BLOOD PRESSURE: 68 MMHG | SYSTOLIC BLOOD PRESSURE: 124 MMHG | HEIGHT: 68 IN

## 2023-08-07 DIAGNOSIS — I12.9 HYPERTENSION, RENAL: ICD-10-CM

## 2023-08-07 DIAGNOSIS — O09.893 SUPERVISION OF OTHER HIGH RISK PREGNANCIES, THIRD TRIMESTER: ICD-10-CM

## 2023-08-07 DIAGNOSIS — Q60.5 HYPOPLASTIC KIDNEY: ICD-10-CM

## 2023-08-07 DIAGNOSIS — N18.1 CKD (CHRONIC KIDNEY DISEASE) STAGE 1, GFR 90 ML/MIN OR GREATER: ICD-10-CM

## 2023-08-07 DIAGNOSIS — I10 PRIMARY HYPERTENSION: ICD-10-CM

## 2023-08-07 DIAGNOSIS — O10.919 CHRONIC HYPERTENSION AFFECTING PREGNANCY: ICD-10-CM

## 2023-08-07 DIAGNOSIS — O09.893 SUPERVISION OF OTHER HIGH RISK PREGNANCIES, THIRD TRIMESTER: Primary | ICD-10-CM

## 2023-08-07 PROCEDURE — 76819 FETAL BIOPHYS PROFIL W/O NST: CPT | Mod: TC | Performed by: RADIOLOGY

## 2023-08-07 PROCEDURE — 99207 PR COMPLICATED OB VISIT: CPT | Performed by: FAMILY MEDICINE

## 2023-08-07 ASSESSMENT — PAIN SCALES - GENERAL: PAINLEVEL: NO PAIN (0)

## 2023-08-07 NOTE — TELEPHONE ENCOUNTER
Should have had weekly bpp scheduled and is coming today. Can we see if US can work her in for this while she is here. Otherwise we will need an NST completed.  Yvette Cedeno MD

## 2023-08-07 NOTE — PROGRESS NOTES
Concerns: questions about delivery   Doing well.  No concerns today.  No vaginal bleeding, LOF.  No contractions.  BPP planned weekly throughout the pregnancy. Discussed timing of delivery and would expect 37-39 week induction depending on bp control. Her BP today is upward trending and suggests 37 weeks may be closer to time for delivery.   Discussed PTL, PROM, and PreE symptoms and when to call or come in.  RTC 2 weeks.    Yvette Cedeno MD, MD

## 2023-08-14 ENCOUNTER — MEDICAL CORRESPONDENCE (OUTPATIENT)
Dept: HEALTH INFORMATION MANAGEMENT | Facility: CLINIC | Age: 28
End: 2023-08-14

## 2023-08-14 ENCOUNTER — ANCILLARY PROCEDURE (OUTPATIENT)
Dept: ULTRASOUND IMAGING | Facility: OTHER | Age: 28
End: 2023-08-14
Attending: FAMILY MEDICINE
Payer: COMMERCIAL

## 2023-08-14 ENCOUNTER — TELEPHONE (OUTPATIENT)
Dept: FAMILY MEDICINE | Facility: OTHER | Age: 28
End: 2023-08-14

## 2023-08-14 DIAGNOSIS — I10 PRIMARY HYPERTENSION: ICD-10-CM

## 2023-08-14 DIAGNOSIS — Q60.5 HYPOPLASTIC KIDNEY: ICD-10-CM

## 2023-08-14 DIAGNOSIS — I12.9 HYPERTENSION, RENAL: ICD-10-CM

## 2023-08-14 PROCEDURE — 76819 FETAL BIOPHYS PROFIL W/O NST: CPT | Mod: TC | Performed by: RADIOLOGY

## 2023-08-14 NOTE — TELEPHONE ENCOUNTER
INCOMING FORMS    Sender: catalino    Type of Form, letter or note (What is requested?): order    How was the form received?: Fax    How should forms be returned?:  Fax : [phone]    Form placed in AE bin for review/signature if appropriate.

## 2023-08-14 NOTE — TELEPHONE ENCOUNTER
Signed forms faxed back to [facility] at [phone].    Forms placed in TC bin for scanning.

## 2023-08-14 NOTE — PROGRESS NOTES
Concerns: ***  {[id number]}  Discussed kick counts and fetal movement.  Discussed PTL, PROM, and when to call or come in.  RTC 2 weeks.    Yvette Cedeno MD, MD

## 2023-08-17 ENCOUNTER — TELEPHONE (OUTPATIENT)
Dept: FAMILY MEDICINE | Facility: OTHER | Age: 28
End: 2023-08-17
Payer: COMMERCIAL

## 2023-08-17 NOTE — TELEPHONE ENCOUNTER
INCOMING FORMS    Sender: Yonis    Type of Form, letter or note (What is requested?): Order    How was the form received?: Fax    How should forms be returned?:  Fax : [phone]    Form placed in AE bin for review/signature if appropriate.

## 2023-08-21 ENCOUNTER — PRENATAL OFFICE VISIT (OUTPATIENT)
Dept: FAMILY MEDICINE | Facility: OTHER | Age: 28
End: 2023-08-21
Payer: COMMERCIAL

## 2023-08-21 ENCOUNTER — ANCILLARY PROCEDURE (OUTPATIENT)
Dept: ULTRASOUND IMAGING | Facility: OTHER | Age: 28
End: 2023-08-21
Attending: FAMILY MEDICINE
Payer: COMMERCIAL

## 2023-08-21 VITALS
RESPIRATION RATE: 16 BRPM | OXYGEN SATURATION: 98 % | TEMPERATURE: 98.3 F | WEIGHT: 146 LBS | BODY MASS INDEX: 22.53 KG/M2 | SYSTOLIC BLOOD PRESSURE: 120 MMHG | HEART RATE: 86 BPM | DIASTOLIC BLOOD PRESSURE: 80 MMHG

## 2023-08-21 DIAGNOSIS — O09.893 SUPERVISION OF OTHER HIGH RISK PREGNANCIES, THIRD TRIMESTER: ICD-10-CM

## 2023-08-21 DIAGNOSIS — Q60.5 HYPOPLASTIC KIDNEY: Primary | ICD-10-CM

## 2023-08-21 DIAGNOSIS — O10.919 CHRONIC HYPERTENSION AFFECTING PREGNANCY: ICD-10-CM

## 2023-08-21 DIAGNOSIS — I10 PRIMARY HYPERTENSION: ICD-10-CM

## 2023-08-21 PROCEDURE — 76819 FETAL BIOPHYS PROFIL W/O NST: CPT | Mod: TC | Performed by: RADIOLOGY

## 2023-08-21 PROCEDURE — 99207 PR PRENATAL VISIT: CPT | Performed by: FAMILY MEDICINE

## 2023-08-21 ASSESSMENT — PAIN SCALES - GENERAL: PAINLEVEL: NO PAIN (0)

## 2023-08-21 NOTE — PROGRESS NOTES
Concerns: no concerns just specifics of the hospital  Doing well.  No concerns today.  No contractions.  Reportable signs and symptoms discussed.  Discussed kick counts and fetal movement.  Discussed PTL, PROM, and when to call or come in.  RTC 2 weeks.    Yvette Cedeno MD, MD

## 2023-08-24 ENCOUNTER — MEDICAL CORRESPONDENCE (OUTPATIENT)
Dept: HEALTH INFORMATION MANAGEMENT | Facility: CLINIC | Age: 28
End: 2023-08-24
Payer: COMMERCIAL

## 2023-08-28 ENCOUNTER — ANCILLARY PROCEDURE (OUTPATIENT)
Dept: ULTRASOUND IMAGING | Facility: OTHER | Age: 28
End: 2023-08-28
Attending: FAMILY MEDICINE
Payer: COMMERCIAL

## 2023-08-28 PROCEDURE — 76819 FETAL BIOPHYS PROFIL W/O NST: CPT | Mod: TC | Performed by: RADIOLOGY

## 2023-09-06 ENCOUNTER — ANCILLARY PROCEDURE (OUTPATIENT)
Dept: ULTRASOUND IMAGING | Facility: OTHER | Age: 28
End: 2023-09-06
Attending: FAMILY MEDICINE
Payer: COMMERCIAL

## 2023-09-06 ENCOUNTER — PRENATAL OFFICE VISIT (OUTPATIENT)
Dept: FAMILY MEDICINE | Facility: OTHER | Age: 28
End: 2023-09-06
Payer: COMMERCIAL

## 2023-09-06 VITALS
HEART RATE: 81 BPM | RESPIRATION RATE: 16 BRPM | DIASTOLIC BLOOD PRESSURE: 84 MMHG | OXYGEN SATURATION: 98 % | WEIGHT: 150 LBS | SYSTOLIC BLOOD PRESSURE: 120 MMHG | TEMPERATURE: 97.4 F | BODY MASS INDEX: 23.15 KG/M2

## 2023-09-06 DIAGNOSIS — I12.9 HYPERTENSION, RENAL: ICD-10-CM

## 2023-09-06 DIAGNOSIS — R80.8 OTHER PROTEINURIA: ICD-10-CM

## 2023-09-06 DIAGNOSIS — Q60.5 HYPOPLASTIC KIDNEY: ICD-10-CM

## 2023-09-06 DIAGNOSIS — O09.893 SUPERVISION OF OTHER HIGH RISK PREGNANCIES, THIRD TRIMESTER: Primary | ICD-10-CM

## 2023-09-06 DIAGNOSIS — I10 PRIMARY HYPERTENSION: ICD-10-CM

## 2023-09-06 DIAGNOSIS — N18.2 CKD (CHRONIC KIDNEY DISEASE) STAGE 2, GFR 60-89 ML/MIN: ICD-10-CM

## 2023-09-06 PROCEDURE — 99207 PR PRENATAL VISIT: CPT | Performed by: FAMILY MEDICINE

## 2023-09-06 PROCEDURE — 87653 STREP B DNA AMP PROBE: CPT | Performed by: FAMILY MEDICINE

## 2023-09-06 PROCEDURE — 76819 FETAL BIOPHYS PROFIL W/O NST: CPT | Mod: TC | Performed by: RADIOLOGY

## 2023-09-06 PROCEDURE — 76816 OB US FOLLOW-UP PER FETUS: CPT | Mod: TC | Performed by: RADIOLOGY

## 2023-09-06 ASSESSMENT — PAIN SCALES - GENERAL: PAINLEVEL: NO PAIN (0)

## 2023-09-06 NOTE — PROGRESS NOTES
Concerns: delivery, post partum  - BP still controlled, discussed delivery at 37 weeks with chronic uncontrolled HTN - she has been doing well at this time and US today looks reassuring. Can consider delay up to 39 weeks if doing well and trying to get baby to grow more before delivery. At this time would like to wait. Seeing me again right at 37 weeks and can continue to watch carefully.  No vaginal bleeding, LOF, contractions.  No HA, RUQ pain, N/V, visual changes.  GBS done today.  Labor precautions discussed.  RTC 1 week.  Prenatal flowsheet information is reviewed.    Yevtte Cedeno MD, MD

## 2023-09-07 LAB — GP B STREP DNA SPEC QL NAA+PROBE: NEGATIVE

## 2023-09-11 ENCOUNTER — PRENATAL OFFICE VISIT (OUTPATIENT)
Dept: FAMILY MEDICINE | Facility: OTHER | Age: 28
End: 2023-09-11
Payer: COMMERCIAL

## 2023-09-11 VITALS
RESPIRATION RATE: 16 BRPM | HEART RATE: 97 BPM | TEMPERATURE: 97.5 F | OXYGEN SATURATION: 98 % | WEIGHT: 149 LBS | SYSTOLIC BLOOD PRESSURE: 122 MMHG | DIASTOLIC BLOOD PRESSURE: 80 MMHG | BODY MASS INDEX: 22.99 KG/M2

## 2023-09-11 DIAGNOSIS — R80.8 OTHER PROTEINURIA: Primary | ICD-10-CM

## 2023-09-11 DIAGNOSIS — O09.893 SUPERVISION OF OTHER HIGH RISK PREGNANCIES, THIRD TRIMESTER: ICD-10-CM

## 2023-09-11 DIAGNOSIS — Q60.5 HYPOPLASTIC KIDNEY: ICD-10-CM

## 2023-09-11 DIAGNOSIS — I12.9 HYPERTENSION, RENAL: ICD-10-CM

## 2023-09-11 DIAGNOSIS — N18.2 CKD (CHRONIC KIDNEY DISEASE) STAGE 2, GFR 60-89 ML/MIN: ICD-10-CM

## 2023-09-11 PROCEDURE — 99207 PR PRENATAL VISIT: CPT | Performed by: FAMILY MEDICINE

## 2023-09-11 ASSESSMENT — PAIN SCALES - GENERAL: PAINLEVEL: NO PAIN (0)

## 2023-09-11 NOTE — PROGRESS NOTES
Concerns: Asprin otc- asking when to stop. At 37 weeks can discontinue, though we discussed planned induction. As she is not having any contractions and plans induction at 39 weeks, we will ask her to discontinue 1 week prior to planned delivery  The risks and benfits of labor induction were discussed with her today.  I did discuss the risks of  section which may be increased because of the induction attempt, and also risks of fetal bradycardia, uterine rupture, fetal intolerance of labor, failure of the induction process to work, and possible long hospital stay.  Bleeding and infection risks may increase due to the induction process as well.  The alternative is to wait until labor ensues naturally.  The patient has chosen to go ahead with the induction attempt.   in pm due to poorly favorable cervix and pitocin to follow.    No vaginal bleeding, LOF, contractions.  No HA, RUQ pain, N/V, visual changes.  Labor precautions discussed.  RTC 1 week.    Yvette Cedeno MD, MD

## 2023-09-13 ENCOUNTER — ANCILLARY PROCEDURE (OUTPATIENT)
Dept: ULTRASOUND IMAGING | Facility: OTHER | Age: 28
End: 2023-09-13
Attending: FAMILY MEDICINE
Payer: COMMERCIAL

## 2023-09-13 DIAGNOSIS — I10 PRIMARY HYPERTENSION: ICD-10-CM

## 2023-09-13 DIAGNOSIS — I12.9 HYPERTENSION, RENAL: ICD-10-CM

## 2023-09-13 DIAGNOSIS — Q60.5 HYPOPLASTIC KIDNEY: ICD-10-CM

## 2023-09-13 PROCEDURE — 76819 FETAL BIOPHYS PROFIL W/O NST: CPT | Mod: TC | Performed by: RADIOLOGY

## 2023-09-15 DIAGNOSIS — N18.2 CKD (CHRONIC KIDNEY DISEASE) STAGE 2, GFR 60-89 ML/MIN: Primary | ICD-10-CM

## 2023-09-15 DIAGNOSIS — R80.9 PROTEINURIA: ICD-10-CM

## 2023-09-18 ENCOUNTER — MYC MEDICAL ADVICE (OUTPATIENT)
Dept: FAMILY MEDICINE | Facility: OTHER | Age: 28
End: 2023-09-18
Payer: COMMERCIAL

## 2023-09-18 NOTE — TELEPHONE ENCOUNTER
Patient calls. She states that she had a voicemail and was told to call back. Advised her of this message. Do not see any other messages.     LESLY PrabhakarN, RN, PHN  Registered Nurse-Clinic Triage  [facility]/[place]  9/18/2023 at 1:10 PM

## 2023-09-19 ENCOUNTER — LAB (OUTPATIENT)
Dept: LAB | Facility: CLINIC | Age: 28
End: 2023-09-19
Payer: COMMERCIAL

## 2023-09-19 ENCOUNTER — OFFICE VISIT (OUTPATIENT)
Dept: NEPHROLOGY | Facility: CLINIC | Age: 28
End: 2023-09-19
Payer: COMMERCIAL

## 2023-09-19 VITALS
SYSTOLIC BLOOD PRESSURE: 144 MMHG | DIASTOLIC BLOOD PRESSURE: 97 MMHG | WEIGHT: 150 LBS | OXYGEN SATURATION: 97 % | BODY MASS INDEX: 23.15 KG/M2 | HEART RATE: 96 BPM

## 2023-09-19 DIAGNOSIS — Z13.220 SCREENING FOR HYPERLIPIDEMIA: Primary | ICD-10-CM

## 2023-09-19 DIAGNOSIS — R80.9 PROTEINURIA: ICD-10-CM

## 2023-09-19 DIAGNOSIS — N18.2 CKD (CHRONIC KIDNEY DISEASE) STAGE 2, GFR 60-89 ML/MIN: ICD-10-CM

## 2023-09-19 DIAGNOSIS — N18.2 CKD (CHRONIC KIDNEY DISEASE) STAGE 2, GFR 60-89 ML/MIN: Primary | ICD-10-CM

## 2023-09-19 DIAGNOSIS — O23.43 URINARY TRACT INFECTION IN MOTHER DURING THIRD TRIMESTER OF PREGNANCY: ICD-10-CM

## 2023-09-19 LAB
ALBUMIN MFR UR ELPH: 11.8 MG/DL
ALBUMIN SERPL BCG-MCNC: 4 G/DL (ref 3.5–5.2)
ALBUMIN UR-MCNC: NEGATIVE MG/DL
ALP SERPL-CCNC: 166 U/L (ref 35–104)
ALT SERPL W P-5'-P-CCNC: 17 U/L (ref 0–50)
ANION GAP SERPL CALCULATED.3IONS-SCNC: 14 MMOL/L (ref 7–15)
APPEARANCE UR: CLEAR
AST SERPL W P-5'-P-CCNC: 39 U/L (ref 0–45)
BACTERIA #/AREA URNS HPF: ABNORMAL /HPF
BILIRUB DIRECT SERPL-MCNC: <0.2 MG/DL (ref 0–0.3)
BILIRUB SERPL-MCNC: 0.3 MG/DL
BILIRUB UR QL STRIP: NEGATIVE
BUN SERPL-MCNC: 20.6 MG/DL (ref 6–20)
CALCIUM SERPL-MCNC: 9.6 MG/DL (ref 8.6–10)
CHLORIDE SERPL-SCNC: 103 MMOL/L (ref 98–107)
CHOLEST SERPL-MCNC: 211 MG/DL
COLOR UR AUTO: ABNORMAL
CREAT SERPL-MCNC: 1.3 MG/DL (ref 0.51–0.95)
CREAT UR-MCNC: 121 MG/DL
CREAT UR-MCNC: 126 MG/DL
DEPRECATED HCO3 PLAS-SCNC: 19 MMOL/L (ref 22–29)
EGFRCR SERPLBLD CKD-EPI 2021: 57 ML/MIN/1.73M2
GLUCOSE SERPL-MCNC: 76 MG/DL (ref 70–99)
GLUCOSE UR STRIP-MCNC: NEGATIVE MG/DL
HDLC SERPL-MCNC: 78 MG/DL
HGB UR QL STRIP: NEGATIVE
KETONES UR STRIP-MCNC: NEGATIVE MG/DL
LDLC SERPL CALC-MCNC: 95 MG/DL
LEUKOCYTE ESTERASE UR QL STRIP: ABNORMAL
MICROALBUMIN UR-MCNC: 20.3 MG/L
MICROALBUMIN/CREAT UR: 16.78 MG/G CR (ref 0–25)
NITRATE UR QL: NEGATIVE
NONHDLC SERPL-MCNC: 133 MG/DL
PH UR STRIP: 5.5 [PH] (ref 5–7)
POTASSIUM SERPL-SCNC: 4.2 MMOL/L (ref 3.4–5.3)
PROT SERPL-MCNC: 6.6 G/DL (ref 6.4–8.3)
PROT/CREAT 24H UR: 0.09 MG/MG CR (ref 0–0.2)
RBC #/AREA URNS AUTO: ABNORMAL /HPF
SKIP: ABNORMAL
SODIUM SERPL-SCNC: 136 MMOL/L (ref 136–145)
SP GR UR STRIP: 1.02 (ref 1–1.03)
SQUAMOUS #/AREA URNS AUTO: ABNORMAL /LPF
TRIGL SERPL-MCNC: 190 MG/DL
UROBILINOGEN UR STRIP-MCNC: NORMAL MG/DL
WBC #/AREA URNS AUTO: ABNORMAL /HPF

## 2023-09-19 PROCEDURE — 84156 ASSAY OF PROTEIN URINE: CPT

## 2023-09-19 PROCEDURE — 82248 BILIRUBIN DIRECT: CPT

## 2023-09-19 PROCEDURE — 82043 UR ALBUMIN QUANTITATIVE: CPT

## 2023-09-19 PROCEDURE — 82570 ASSAY OF URINE CREATININE: CPT

## 2023-09-19 PROCEDURE — 87086 URINE CULTURE/COLONY COUNT: CPT

## 2023-09-19 PROCEDURE — 80053 COMPREHEN METABOLIC PANEL: CPT

## 2023-09-19 PROCEDURE — 81001 URINALYSIS AUTO W/SCOPE: CPT

## 2023-09-19 PROCEDURE — 36415 COLL VENOUS BLD VENIPUNCTURE: CPT

## 2023-09-19 PROCEDURE — 80061 LIPID PANEL: CPT

## 2023-09-19 PROCEDURE — 99215 OFFICE O/P EST HI 40 MIN: CPT | Performed by: INTERNAL MEDICINE

## 2023-09-19 NOTE — NURSING NOTE
Discussed plan of care with Dr. Barrios. Dr. Barrios expresses concern for preeclampsia evidenced by elevated blood pressure at clinic visit today paired with elevated creatinine reading. Dr. Barrios advised that patient seek evaluation with OB Triage. [facility] Labor and Delivery unit contacted by writer who advised that patient arrive to 2nd floor of [facility] to triage department following clinic visit today.     Patient in agreement with plan and will drive to [facility]. Her  will meet her at the hospital triage center.     3 month follow up visit with Dr. Barrios secured. Advised patient to contact clinic with any concerns that may arise.

## 2023-09-19 NOTE — NURSING NOTE
Charisma Mathew's goals for this visit include:   Chief Complaint   Patient presents with    RECHECK     6 week follow up CKD        She requests these members of her care team be copied on today's visit information: yes     PCP: Yvette Cedeno    Referring Provider:  No referring provider defined for this encounter.    BP (!) 145/89 (BP Location: Left arm, Patient Position: Chair, Cuff Size: Adult Regular)   Pulse 68   Wt 68 kg (150 lb)   LMP 12/25/2022   SpO2 97%   BMI 23.15 kg/m      Do you need any medication refills at today's visit? No     TRICIA Evans   Neph/Pulm [facility]

## 2023-09-19 NOTE — PROGRESS NOTES
I was asked to see this patient by Edel Rebollar    CC:  -CKD stage I A2  -HTN secondary to CKD  -Pregnancy 38 weeks+2 days  -Probable Preeclampsia    HPI:  Charisma Valencia is a very pleasant 28 year old female  who presents for Follow-up on proteinuric CKD. She is now 38 weeks+2 pregnant. Her due date is 2023. 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 She has been previously followed by Dr. Lorenzo Houston in the in the [facility].  She has a history of long-standing asymptomatic non-nephrotic range isolated proteinuria, hypertension and hypoplastic/dysplastic right kidney.  Her last renal ultrasound (May, 2016) revealed a right kidney of 6.3 cm (with little growth by comparison) and left kidney 11.6 cm.     She denies any personal or family history of kidney stones or CKD/dialysis/kidney transplant. Her prenatal history is unremarkable as reported. No frequent urinary tract infections during her childhood.    In 2022, she has expressed interest to get pregnant so I stopped her lisinopril and started her on nifedipine. Unfortunately, nifedipine caused her headache so she elected to stop it. During her last visit in , her BP was elevated and she was slightly tachycardic so she was started on labetalol 100 mg twice daily. Her BP has been ranging between 1110-120/70-80; however, her BP in clinic today was elevated to 145/89.    Overall, she feels well.  She has no acute complaints at this point.  She denies any visual symptoms, blurry vision, headache.  She did notice that her ankles has been swollen for the past 2 weeks since she started back at school.  The swelling has increased yesterday and today.  The baby is moving okay.  She does not know the sex of the baby.  She continues to be on labetalol.  She has stopped her aspirin yesterday.    She saw her PCP/OB/GYN  last week and she is planned for labor induction on September 24, 2013.  Her weight gain is appropriate,  energy is good.  She denies gross hematuria, dysuria or urinary tract infection. She denies recent fever, shortness of breath, palpitation, abdominal pain, vomiting, abnormal bowel movement, skin rashes, joint pain/swelling or muscle weakness. The remainder of comprehensive review of systems was unremarkable.      She is a  for middle school    Wt Readings from Last 4 Encounters:   09/19/23 68 kg (150 lb)   09/11/23 67.6 kg (149 lb)   09/06/23 68 kg (150 lb)   08/21/23 66.2 kg (146 lb)     Wt Readings from Last 4 Encounters:   09/19/23 68 kg (150 lb)   09/11/23 67.6 kg (149 lb)   09/06/23 68 kg (150 lb)   08/21/23 66.2 kg (146 lb)     No Known Allergies    aspirin (ASA) 81 MG EC tablet, Take 1 tablet (81 mg) by mouth daily  calcium carbonate (OS-MERCY) 500 MG tablet, Take 1 tablet (500 mg) by mouth 2 times daily  labetalol (NORMODYNE) 100 MG tablet, Take 1 tablet (100 mg) by mouth 2 times daily  Prenatal Vit-Fe Fumarate-FA (PRENATAL MULTIVITAMIN W/IRON) 27-0.8 MG tablet, Take 1 tablet by mouth daily    No current facility-administered medications on file prior to visit.    Fhx: HTN, CKD    Past Surgical History:   Procedure Laterality Date    WISDOM TOOTH EXTRACTION         Social History     Tobacco Use    Smoking status: Never    Smokeless tobacco: Never   Vaping Use    Vaping Use: Never used   Substance Use Topics    Alcohol use: Not Currently    Drug use: Never     ROS: A 12 system review of systems was negative other than noted here or above.     Exam:  BP (!) 145/89 (BP Location: Left arm, Patient Position: Chair, Cuff Size: Adult Regular)   Pulse 68   Wt 68 kg (150 lb)   LMP 12/25/2022   SpO2 97%   BMI 23.15 kg/m    EYES: PERRL  HENT: mouth without ulcers or lesions  NECK: supple, no adenopathy  RESP: lungs clear to auscultation - no rales, rhonchi or wheezes  CV: regular rhythm, normal  rate, no rub   ABDOMEN:  soft, nontender, no HSM or masses and bowel sounds normal  MS: extremities normal- no gross deformities noted, no evidence of inflammation in joints, no muscle tenderness  SKIN: no rash  NEURO: Normal strength and tone, sensory exam grossly normal, mentation intact and speech normal  PSYCH: mentation appears normal. and affect normal/bright  +1 LE edema    Results:reviewed in details with the patient    ASSESSMENT AND RECOMMENDATIONS:   1.  Probable preeclampsia:  Her blood pressure in clinic today is elevated; she also has some elevation in her creatinine up to 1.3 mg/dL from her baseline around 0.9 mg/dL.  She also has some worsening lower extremity edema.  Her UA does not show any proteinuria.  She is currently at 38+2 weeks.  She does not have any visual symptoms or headache.  Given these constellation of symptoms, there is concern for preeclampsia.  There is no CBC done today.  Her LFTs showed doubling of her alkaline phosphatase.  - The patient was directed to the [facility] labor and delivery for stress testing, consultation was OB and possible delivery.  The labor and delivery at the [facility] were called and updated.    2. 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  She does have an increase in her creatinine today.  It is expected that by the third trimester of pregnancy, the creatinine will go back to pre-pregnancy level.  However her pregnancy level is 0.9 mg/dL.  Of note, her iohexol in 2012 was 76 ml/min (when Cr was 0.9 mg/dL). No evidence of proteinuria.   -Holding on any VIJAY blockade at this point given pregnancy  -She has been on labetalol with good BP and proteinuria control  -She has been on aspirin and calcium to lower her risk of pre-eclampsia.  Her aspirin was stopped yesterday in anticipation of delivery.    2. Counseling on CKD during pregnancy:  The risk of CKD progression with Pregnancy in early stages of CKD is low (<10%)[Piccoli  2015]. This risk in increased in the presence of uncontrolled HTN and proteinuria and thus it is very important to optimize those factors prior to pregnancy. She was also instructed about the increased risk of pre-eclampsia when compared with women without renal disease [10x higher]. For that reason, Aspirin  mg is recommended starting 12 weeks of gestation till 36 weeks [Katty 2017]. Calcium supplementation 1000mg is also recommended at least 3 months prior to conception. In addition, there is a slightly increased risk of  delivery and SGA babies.    3. HTN: She has high blood pressure superimposed on pregestational hypertension.    Plan as above  She has been maintained on labetalol.  She is planning to lactate and labetalol continues to be a good option during lactation.      Follow-up in 3 months    The total time of this encounter amounted to 45 minutes on the day of the encounter 2023. This time included face to face time spent with the patient, preparatory work and chart review, ordering tests, and performing post visit documentation.    *This dictation was prepared in part using Dragon recognition software.  As a result errors may occur. When identified these transcription errors have been corrected.  While every attempt is made to correct errors during dictation, errors may still exist.     Barry Barrios MD   [facility]   Department of Medicine   Division of Renal Disease and Hypertension

## 2023-09-20 DIAGNOSIS — O23.43 URINARY TRACT INFECTION IN MOTHER DURING THIRD TRIMESTER OF PREGNANCY: Primary | ICD-10-CM

## 2023-09-21 LAB — BACTERIA UR CULT: NORMAL

## 2023-09-24 ENCOUNTER — TELEPHONE (OUTPATIENT)
Dept: FAMILY MEDICINE | Facility: OTHER | Age: 28
End: 2023-09-24
Payer: COMMERCIAL

## 2023-09-25 ENCOUNTER — PRENATAL OFFICE VISIT (OUTPATIENT)
Dept: FAMILY MEDICINE | Facility: OTHER | Age: 28
End: 2023-09-25
Payer: COMMERCIAL

## 2023-09-25 VITALS
SYSTOLIC BLOOD PRESSURE: 122 MMHG | TEMPERATURE: 97.6 F | RESPIRATION RATE: 16 BRPM | OXYGEN SATURATION: 97 % | WEIGHT: 134.5 LBS | HEART RATE: 88 BPM | BODY MASS INDEX: 20.75 KG/M2 | DIASTOLIC BLOOD PRESSURE: 80 MMHG

## 2023-09-25 DIAGNOSIS — I12.9 HYPERTENSION, RENAL: Primary | ICD-10-CM

## 2023-09-25 PROCEDURE — 99213 OFFICE O/P EST LOW 20 MIN: CPT | Mod: 25 | Performed by: FAMILY MEDICINE

## 2023-09-25 RX ORDER — LABETALOL 100 MG/1
300 TABLET, FILM COATED ORAL 2 TIMES DAILY
COMMUNITY
Start: 2023-09-25 | End: 2023-10-09

## 2023-09-25 ASSESSMENT — PAIN SCALES - GENERAL: PAINLEVEL: NO PAIN (0)

## 2023-09-25 NOTE — PROGRESS NOTES
Assessment & Plan       ICD-10-CM    1. Hypertension, renal  I12.9       2. Preeclampsia in postpartum period  O14.95         Symptoms and labs were improving for preeclampsia before she left the hospital and at this time her blood pressure is back to baseline.  We will need to follow-up in 1 to 2 weeks and can repeat her labs at that time to make sure they return to baseline as well.  In the meantime she will continue her labetalol at 300 mg daily and we did discuss with her that her baseline medication dosing could take up to 12 weeks to return to and we can return as needed based on lightheadedness dizziness or blood pressures which have remained under the 110s      No LOS data to display   Time spent by me doing chart review, history and exam, documentation and further activities per the note           Yvette Cedeno MD, MD  [facility]    Genesis Zafar is a 28 year old, presenting for the following health issues:  [place] Feeding Problem      HPI   Her blood pressures have been stable in the 120s.  She has had no headaches or right upper quadrant abdominal pain and generally feels well other than fatigue which is common with a new baby      Has questions about breast feeding. She has noticed that she can pump well but sometimes has to physically squeeze breasts because baby isn't naturally sucking.       Review of Systems   Constitutional, HEENT, cardiovascular, pulmonary, GI, , musculoskeletal, neuro, skin, endocrine and psych systems are negative, except as otherwise noted.      Objective    /80   Pulse 88   Temp 97.6  F (36.4  C) (Temporal)   Resp 16   Wt 61 kg (134 lb 8 oz)   LMP 2022   SpO2 97%   BMI 20.75 kg/m    Body mass index is 20.75 kg/m .  Physical Exam   GENERAL: healthy, alert and no distress  RESP: lungs clear to auscultation - no rales, rhonchi or wheezes  CV: regular rate and rhythm, normal S1 S2, no S3 or S4, no murmur, click or rub, no peripheral  edema and peripheral pulses strong  NEURO: Normal strength and tone, mentation intact and speech normal  PSYCH: mentation appears normal, affect normal/bright

## 2023-10-09 ENCOUNTER — OFFICE VISIT (OUTPATIENT)
Dept: FAMILY MEDICINE | Facility: OTHER | Age: 28
End: 2023-10-09
Payer: COMMERCIAL

## 2023-10-09 VITALS
HEART RATE: 120 BPM | SYSTOLIC BLOOD PRESSURE: 122 MMHG | HEIGHT: 68 IN | OXYGEN SATURATION: 96 % | WEIGHT: 132 LBS | BODY MASS INDEX: 20 KG/M2 | DIASTOLIC BLOOD PRESSURE: 72 MMHG | TEMPERATURE: 97.3 F | RESPIRATION RATE: 16 BRPM

## 2023-10-09 DIAGNOSIS — I12.9 HYPERTENSION, RENAL: ICD-10-CM

## 2023-10-09 PROCEDURE — 99213 OFFICE O/P EST LOW 20 MIN: CPT | Mod: 25 | Performed by: FAMILY MEDICINE

## 2023-10-09 RX ORDER — LABETALOL 100 MG/1
100 TABLET, FILM COATED ORAL 2 TIMES DAILY
Qty: 180 TABLET | Refills: 1 | Status: SHIPPED | OUTPATIENT
Start: 2023-10-09 | End: 2024-01-02

## 2023-10-09 ASSESSMENT — PAIN SCALES - GENERAL: PAINLEVEL: NO PAIN (0)

## 2023-10-09 NOTE — PROGRESS NOTES
Assessment & Plan       ICD-10-CM    1. Hypertension, renal  I12.9 labetalol (NORMODYNE) 100 MG tablet       We will adjust her medication dosing today. We will start her on 100 mg of lisinopril twice a day.   Due for postpartum visit at 6 weeks from delivery.    I spent a total of 26 minutes on the day of the visit.   Time spent by me doing chart review, history and exam, documentation and further activities per the note           Yvette Cedeno MD, MD  [facility] WILIAN Zafar is a 28 year old, presenting for the following health issues:  Follow Up        10/9/2023     1:22 PM   Additional Questions   Roomed by Usha       History of Present Illness       Reason for visit:  Postpartum    She eats 2-3 servings of fruits and vegetables daily.She consumes 1 sweetened beverage(s) daily.She exercises with enough effort to increase her heart rate 9 or less minutes per day.  She exercises with enough effort to increase her heart rate 3 or less days per week.   She is taking medications regularly.         Hypertension Follow-up    Do you check your blood pressure regularly outside of the clinic? Yes   Are you following a low salt diet? No  Are your blood pressures ever more than 140 on the top number (systolic) OR more   than 90 on the bottom number (diastolic), for example 140/90? No  Her blood pressure has been getting lower, so she has not taken it for the past couple of days and has been pretty consistent. She did not take her evening medication on 10/06/2023. She is wondering if she can decrease the dosage from 300 mg to 100 mg. She denies any preeclampsia symptoms such as nausea or vomiting or headache.  She was on 100 mg twice a day before pregnancy.         Review of Systems   Constitutional, HEENT, cardiovascular, pulmonary, GI, , musculoskeletal, neuro, skin, endocrine and psych systems are negative, except as otherwise noted.      Objective    /72   Pulse 120   Temp 97.3  " F (36.3  C) (Temporal)   Resp 16   Ht 1.715 m (5' 7.5\")   Wt 59.9 kg (132 lb)   LMP 12/25/2022   SpO2 96%   Breastfeeding Yes   BMI 20.37 kg/m    Body mass index is 20.37 kg/m .  Physical Exam   GENERAL: healthy, alert and no distress  RESP: lungs clear to auscultation - no rales, rhonchi or wheezes  CV: regular rate and rhythm, normal S1 S2, no S3 or S4, no murmur, click or rub, no peripheral edema and peripheral pulses strong  PSYCH: mentation appears normal, affect normal/bright                      "

## 2023-10-15 RX ORDER — LABETALOL 100 MG/1
TABLET, FILM COATED ORAL
Qty: 180 TABLET | Refills: 1 | OUTPATIENT
Start: 2023-10-15

## 2023-10-30 ENCOUNTER — E-VISIT (OUTPATIENT)
Dept: FAMILY MEDICINE | Facility: OTHER | Age: 28
End: 2023-10-30
Payer: COMMERCIAL

## 2023-10-30 DIAGNOSIS — B37.89 CANDIDIASIS OF BREAST: Primary | ICD-10-CM

## 2023-10-30 PROCEDURE — 99421 OL DIG E/M SVC 5-10 MIN: CPT | Performed by: PHYSICIAN ASSISTANT

## 2023-10-31 RX ORDER — CLOTRIMAZOLE 1 %
CREAM (GRAM) TOPICAL 2 TIMES DAILY
Qty: 30 G | Refills: 0 | Status: SHIPPED | OUTPATIENT
Start: 2023-10-31 | End: 2024-04-02

## 2023-11-08 ENCOUNTER — PRENATAL OFFICE VISIT (OUTPATIENT)
Dept: FAMILY MEDICINE | Facility: OTHER | Age: 28
End: 2023-11-08
Payer: COMMERCIAL

## 2023-11-08 VITALS
HEIGHT: 68 IN | HEART RATE: 114 BPM | WEIGHT: 129.5 LBS | BODY MASS INDEX: 19.63 KG/M2 | RESPIRATION RATE: 14 BRPM | DIASTOLIC BLOOD PRESSURE: 74 MMHG | TEMPERATURE: 97.8 F | SYSTOLIC BLOOD PRESSURE: 124 MMHG | OXYGEN SATURATION: 97 %

## 2023-11-08 DIAGNOSIS — I10 PRIMARY HYPERTENSION: ICD-10-CM

## 2023-11-08 DIAGNOSIS — R80.8 OTHER PROTEINURIA: ICD-10-CM

## 2023-11-08 DIAGNOSIS — Q60.5 HYPOPLASTIC KIDNEY: ICD-10-CM

## 2023-11-08 DIAGNOSIS — N18.2 CKD (CHRONIC KIDNEY DISEASE) STAGE 2, GFR 60-89 ML/MIN: ICD-10-CM

## 2023-11-08 LAB
ANION GAP SERPL CALCULATED.3IONS-SCNC: 10 MMOL/L (ref 7–15)
BUN SERPL-MCNC: 18.3 MG/DL (ref 6–20)
CALCIUM SERPL-MCNC: 10.3 MG/DL (ref 8.6–10)
CHLORIDE SERPL-SCNC: 104 MMOL/L (ref 98–107)
CREAT SERPL-MCNC: 0.94 MG/DL (ref 0.51–0.95)
CREAT UR-MCNC: 69.7 MG/DL
DEPRECATED HCO3 PLAS-SCNC: 26 MMOL/L (ref 22–29)
EGFRCR SERPLBLD CKD-EPI 2021: 84 ML/MIN/1.73M2
GLUCOSE SERPL-MCNC: 91 MG/DL (ref 70–99)
MICROALBUMIN UR-MCNC: 47.8 MG/L
MICROALBUMIN/CREAT UR: 68.58 MG/G CR (ref 0–25)
POTASSIUM SERPL-SCNC: 4.3 MMOL/L (ref 3.4–5.3)
SODIUM SERPL-SCNC: 140 MMOL/L (ref 135–145)

## 2023-11-08 PROCEDURE — 82043 UR ALBUMIN QUANTITATIVE: CPT | Performed by: FAMILY MEDICINE

## 2023-11-08 PROCEDURE — 99207 PR POST PARTUM EXAM: CPT | Performed by: FAMILY MEDICINE

## 2023-11-08 PROCEDURE — 82570 ASSAY OF URINE CREATININE: CPT | Performed by: FAMILY MEDICINE

## 2023-11-08 PROCEDURE — 36415 COLL VENOUS BLD VENIPUNCTURE: CPT | Performed by: FAMILY MEDICINE

## 2023-11-08 PROCEDURE — 80048 BASIC METABOLIC PNL TOTAL CA: CPT | Performed by: FAMILY MEDICINE

## 2023-11-08 ASSESSMENT — PAIN SCALES - GENERAL: PAINLEVEL: NO PAIN (0)

## 2023-11-08 NOTE — PROGRESS NOTES
"Charisma is here for a 6-week postpartum checkup.    She had a  and induced vaginal delivery of a viable girl, weight 7 pounds 6 oz., with none complications. (Put on magnesium) Date of delivery was 2023. Since delivery, she has been breast feeding.  She has No signs of infection, bleeding or other complications.  She is not pregnant.  We discussed contraceptions and she has chosen condoms.      Post partum tubal: No  History of Gestational Diabetes? No  Type of Delivery:  Vaginal  Feeding Method:  Breast  If initiated breast feeding and stopped, how long did you breast feed?:  n/a    REVIEW OF SYSTEMS:  Ears/Nose/Throat: negative  Respiratory: negative  Cardiovascular: negative  Gastrointestinal: negative  Genitourinary: negative  Musculoskeletal: negative    Neurologic: negative   Skin: negative   Endocrine:  negative  Vagina: negative  Cervix: negative  Breasts: negative  Vulva: negative  Episiotomy: negative    Contraception Plan: condoms    Medical History Reviewed   Family History Reviewed   Problem List Updated     EXAM:  /78   Pulse 114   Temp 97.8  F (36.6  C) (Temporal)   Resp 14   Ht 1.734 m (5' 8.27\")   Wt 58.7 kg (129 lb 8 oz)   LMP 2022   SpO2 97%   Breastfeeding Yes   BMI 19.54 kg/m    HEENT: grossly normal.  NECK: no lymphadenopathy or thyroidomegaly.  LUNGS: CTA X 2, no rales or crackles.  BACK: No spinal or CVA tenderness.  HEART: RRR without murmurs clicks or gallops.  ABDOMEN: soft, non tender, good bowel sounds, without masses rebound, guarding or tenderness.  PELVIC:  External genitalia; normal without lesion, repair well healed.   Vagina: normal mucosa and rugae, no discharge.  Cervix: multiparous, well healed, without lesion.  Uterus: non pregnant in size, firm , mobile, no lesions,     Normal shape, position and consistency  Adnexa: non tender, without masses.    EXTREMITIES:  warm to touch, good pulses, no ankle edema or calf tenderness.  NEUROLOGIC: grossly " normal.    ASSESSMENT:   6-week postpartum exam after .    PLAN:    Blood pressure has not been requiring medications recently.  She is only been a week off of these at this time and has not updated her baseline nephrology labs yet.  We will get some labs today to see where her return to baseline is and continue to have her monitor her blood pressure as it appears higher today here than it did at home.  I did ask her to bring her home blood pressure cuff and to compare to ours here to see if there is a difference that can be explained otherwise it may be that she just needs a low-dose labetalol restarted as her blood pressure may be trending up.  One-hour glucose tolerance test needed? No  condoms for contraception.  Yvette Cedeno MD

## 2023-11-25 ENCOUNTER — HEALTH MAINTENANCE LETTER (OUTPATIENT)
Age: 28
End: 2023-11-25

## 2023-12-21 DIAGNOSIS — R80.9 PROTEINURIA: Primary | ICD-10-CM

## 2024-01-02 ENCOUNTER — LAB (OUTPATIENT)
Dept: LAB | Facility: CLINIC | Age: 29
End: 2024-01-02
Payer: COMMERCIAL

## 2024-01-02 ENCOUNTER — OFFICE VISIT (OUTPATIENT)
Dept: NEPHROLOGY | Facility: CLINIC | Age: 29
End: 2024-01-02
Payer: COMMERCIAL

## 2024-01-02 VITALS
WEIGHT: 123.2 LBS | OXYGEN SATURATION: 98 % | HEART RATE: 80 BPM | BODY MASS INDEX: 18.59 KG/M2 | DIASTOLIC BLOOD PRESSURE: 83 MMHG | SYSTOLIC BLOOD PRESSURE: 128 MMHG

## 2024-01-02 DIAGNOSIS — R80.9 PROTEINURIA: ICD-10-CM

## 2024-01-02 DIAGNOSIS — O14.90 PRE-ECLAMPSIA, ANTEPARTUM: Primary | ICD-10-CM

## 2024-01-02 DIAGNOSIS — N18.1 STAGE 1 CHRONIC KIDNEY DISEASE: ICD-10-CM

## 2024-01-02 DIAGNOSIS — N18.2 CKD (CHRONIC KIDNEY DISEASE) STAGE 2, GFR 60-89 ML/MIN: ICD-10-CM

## 2024-01-02 LAB
ALBUMIN MFR UR ELPH: 64.3 MG/DL
ANION GAP SERPL CALCULATED.3IONS-SCNC: 10 MMOL/L (ref 7–15)
BASOPHILS # BLD AUTO: 0 10E3/UL (ref 0–0.2)
BASOPHILS NFR BLD AUTO: 1 %
BUN SERPL-MCNC: 22.8 MG/DL (ref 6–20)
CALCIUM SERPL-MCNC: 10 MG/DL (ref 8.6–10)
CHLORIDE SERPL-SCNC: 104 MMOL/L (ref 98–107)
CREAT SERPL-MCNC: 0.88 MG/DL (ref 0.51–0.95)
CREAT UR-MCNC: 118 MG/DL
DEPRECATED HCO3 PLAS-SCNC: 27 MMOL/L (ref 22–29)
EGFRCR SERPLBLD CKD-EPI 2021: >90 ML/MIN/1.73M2
EOSINOPHIL # BLD AUTO: 0.2 10E3/UL (ref 0–0.7)
EOSINOPHIL NFR BLD AUTO: 2 %
ERYTHROCYTE [DISTWIDTH] IN BLOOD BY AUTOMATED COUNT: 12.8 % (ref 10–15)
GLUCOSE SERPL-MCNC: 101 MG/DL (ref 70–99)
HCT VFR BLD AUTO: 41.5 % (ref 35–47)
HGB BLD-MCNC: 13.7 G/DL (ref 11.7–15.7)
IMM GRANULOCYTES # BLD: 0 10E3/UL
IMM GRANULOCYTES NFR BLD: 0 %
LYMPHOCYTES # BLD AUTO: 2.9 10E3/UL (ref 0.8–5.3)
LYMPHOCYTES NFR BLD AUTO: 38 %
MCH RBC QN AUTO: 29.6 PG (ref 26.5–33)
MCHC RBC AUTO-ENTMCNC: 33 G/DL (ref 31.5–36.5)
MCV RBC AUTO: 90 FL (ref 78–100)
MONOCYTES # BLD AUTO: 0.4 10E3/UL (ref 0–1.3)
MONOCYTES NFR BLD AUTO: 5 %
NEUTROPHILS # BLD AUTO: 4 10E3/UL (ref 1.6–8.3)
NEUTROPHILS NFR BLD AUTO: 54 %
NRBC # BLD AUTO: 0 10E3/UL
NRBC BLD AUTO-RTO: 0 /100
PLATELET # BLD AUTO: 220 10E3/UL (ref 150–450)
POTASSIUM SERPL-SCNC: 4.1 MMOL/L (ref 3.4–5.3)
PROT/CREAT 24H UR: 0.54 MG/MG CR (ref 0–0.2)
RBC # BLD AUTO: 4.63 10E6/UL (ref 3.8–5.2)
SODIUM SERPL-SCNC: 141 MMOL/L (ref 135–145)
WBC # BLD AUTO: 7.6 10E3/UL (ref 4–11)

## 2024-01-02 PROCEDURE — 82570 ASSAY OF URINE CREATININE: CPT

## 2024-01-02 PROCEDURE — 84156 ASSAY OF PROTEIN URINE: CPT

## 2024-01-02 PROCEDURE — 36415 COLL VENOUS BLD VENIPUNCTURE: CPT

## 2024-01-02 PROCEDURE — 99214 OFFICE O/P EST MOD 30 MIN: CPT | Mod: GC | Performed by: INTERNAL MEDICINE

## 2024-01-02 PROCEDURE — 85025 COMPLETE CBC W/AUTO DIFF WBC: CPT

## 2024-01-02 PROCEDURE — 80048 BASIC METABOLIC PNL TOTAL CA: CPT

## 2024-01-02 PROCEDURE — 82043 UR ALBUMIN QUANTITATIVE: CPT

## 2024-01-02 NOTE — NURSING NOTE
Charisma Mathew's goals for this visit include:   Chief Complaint   Patient presents with    RECHECK     4 month follow up CKD        She requests these members of her care team be copied on today's visit information: yes     PCP: Yvette Cedeno    Referring Provider:  No referring provider defined for this encounter.    /83 (BP Location: Left arm, Patient Position: Chair, Cuff Size: Adult Regular)   Pulse 80   Wt 55.9 kg (123 lb 3.2 oz)   SpO2 98%   BMI 18.59 kg/m      Do you need any medication refills at today's visit? No     TRICIA Evans   Neph/Pulm [facility]

## 2024-01-02 NOTE — PATIENT INSTRUCTIONS
It was a pleasure taking care of you today.  I've included a brief summary of our discussion and care plan from today's visit below.  Please review this information with your primary care provider.     My recommendations are summarized as follows:  -Monitor BP at home every now and then    Who do I call with any questions after my visit?  Please be in touch if there are any further questions that arise following today's visit.  There are multiple ways to contact your nephrology care team.       During business hours, you may reach your Nephrology Care Team or schedule or reschedule an appointment or lab at [phone].       If you need to schedule imaging, please call [phone].   To schedule a COVID test, please call [phone].     You can always send a secure message through Machinima. Machinima messages are answered by your nurse or doctor typically within 24-48 hours. Please allow extra time on weekends and holidays.       For urgent/emergent questions after business hours, you may reach the on-call Nephrology Fellow by contacting the [facility]  at [phone].     How will I get the results of any tests ordered?    You will receive all of your results.  If you have signed up for Machinima, any tests ordered at your visit will be available to you once resulted on Gider[employer] Mobilehart. Typically the physician reviews them and may or may not make further recommendations. If there are urgent results that require a change in your care plan, your physician or nurse will call you to discuss the next steps. If you are not on Gideros Mobilehart, a letter may be generated and mailed to you with your results.

## 2024-01-02 NOTE — PROGRESS NOTES
"I was asked to see this patient by Edel Rebollar    CC:  -CKD stage I A2  -HTN secondary to CKD  -Probable Preeclampsia    HPI:  Charisma Valencia is a very pleasant 28 year old female  who presents for Follow-up on proteinuric CKD. She is now  after the delivery of her daughter \"Marcellus\" on 23.     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 She has been previously followed by Dr. Lorenzo Houston in the [facility].  She has a history of long-standing asymptomatic non-nephrotic range isolated proteinuria, hypertension and hypoplastic/dysplastic right kidney.  Her last renal ultrasound (May, 2016) revealed a right kidney of 6.3 cm (with little growth by comparison) and left kidney 11.6 cm.     She denies any personal or family history of kidney stones or CKD/dialysis/kidney transplant. Her prenatal history is unremarkable as reported. No frequent urinary tract infections during her childhood.    In 2022, her lisinopril was stopped in anticipation of Pregnancy and she was started her on nifedipine. Unfortunately, nifedipine caused her headache so she elected to stop it. During her visit in , her BP was elevated and she was slightly tachycardic so she was started on labetalol 100 mg twice daily. At subsequent clinic follow-up at 38w she was found to be hypertensive with an increase in creatinine. Oddly, proteinuria remained <0.3g/g but clinical picture concerning for preecclampsia prompting admission and induction for delivery. She delivered a healthy baby girl on 2023.     Since delivery, her blood pressure has improved. She initially required labetalol immediately after delivery but has since weaned off and reports home BP measurements of around 107/70 in recent weeks. She had mild lower extremity edema near the end of her pregnancy that has now resolved.     She is a math " teacher for middle school    Wt Readings from Last 4 Encounters:   01/02/24 55.9 kg (123 lb 3.2 oz)   11/08/23 58.7 kg (129 lb 8 oz)   10/09/23 59.9 kg (132 lb)   09/25/23 61 kg (134 lb 8 oz)     Wt Readings from Last 4 Encounters:   01/02/24 55.9 kg (123 lb 3.2 oz)   11/08/23 58.7 kg (129 lb 8 oz)   10/09/23 59.9 kg (132 lb)   09/25/23 61 kg (134 lb 8 oz)     No Known Allergies    clotrimazole (LOTRIMIN) 1 % external cream, Apply topically 2 times daily  Prenatal Vit-Fe Fumarate-FA (PRENATAL MULTIVITAMIN W/IRON) 27-0.8 MG tablet, Take 1 tablet by mouth daily    No current facility-administered medications on file prior to visit.    Fhx: HTN, CKD    Past Surgical History:   Procedure Laterality Date    WISDOM TOOTH EXTRACTION         Social History     Tobacco Use    Smoking status: Never    Smokeless tobacco: Never   Vaping Use    Vaping Use: Never used   Substance Use Topics    Alcohol use: Not Currently    Drug use: Never     ROS: A 12 system review of systems was negative other than noted here or above.     Exam:  /83 (BP Location: Left arm, Patient Position: Chair, Cuff Size: Adult Regular)   Pulse 80   Wt 55.9 kg (123 lb 3.2 oz)   SpO2 98%   BMI 18.59 kg/m    EYES: PERRL  HENT: mouth without ulcers or lesions  NECK: supple, no adenopathy  RESP: lungs clear to auscultation - no rales, rhonchi or wheezes  CV: regular rhythm, normal rate, no rub   ABDOMEN:  soft, nontender, no HSM or masses and bowel sounds normal  MS: extremities normal- no gross deformities noted, no evidence of inflammation in joints, no muscle tenderness  SKIN: no rash  NEURO: Normal strength and tone, sensory exam grossly normal, mentation intact and speech normal  PSYCH: mentation appears normal. and affect normal/bright  No LE edema    Results:reviewed in details with the patient    ASSESSMENT AND RECOMMENDATIONS:   1.  Probable preeclampsia:  Noted to have elevated blood pressure prior to delivery requiring labetalol for  management. Ultimately seen at 38 weeks and noted to have continued hypertension with an elevated creatinine concerning for development of preeclampsia and advised further evaluation at L&D. On presentation to labor and delivery, she was again noted to have laboratory abnormalities suggesting renal dysfunction with increased creatinine, elevated uric acid. She was not immediately started on magnesium. Repeat Urine protein showed an elevated protein but undetectable creatinine on collected sample so a urine protein:creatinine ratio was unable to be calculated. Ultimately started on magnesium during delivery. She delivered a healthy baby girl through a normal vaginal delivery and reports no delivery related complications.     2. 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 Of note, her iohexol in 2012 was 76 ml/min (when Cr was 0.9 mg/dL). Today she has return of proteinuria of 0.54g/g, but this is expected to be similar to her prepregnancy values and will continue to monitor at this time.   -Holding on any VIJAY blockade at this point given she is breast feeding, although could consider enalapril at 6 months post-delivery if proteinuria remains elevated.  -Currently BP stable without medications  -Recheck labs in 3 months    3. HTN:   She was discharged on labetalol for ongoing HTN management but has subsequently come off of this in recent weeks with stable BP on home measurements. No current medication requirements.    Follow-up in 3 months    Patient was seen and discussed with Dr. Sophie Buagh MD  Division of Renal Disease and Hypertension  [facility]  [email or web address]  Vocera Web Console    Attending's attestation:   I was the supervising physician in the delivery of the service. I verified the history of present illness and the patient's clinical course. I personally evaluated and interviewed the patient on the date of the encounter.       I reviewed the relevant labs, previous  notes and imaging studies, and participated in the formulation of a diagnostic and treatment plan.  I have reviewed and discussed with the fellow their history, physical exam and plan. This note was edited and reflects my direct involvement in the patient's care.       Barry Barrios MD   Physicians  , [facility]  Division of Nephrology  Department of Internal Medicine

## 2024-01-03 LAB
CREAT UR-MCNC: 114 MG/DL
MICROALBUMIN UR-MCNC: 350 MG/L
MICROALBUMIN/CREAT UR: 307.02 MG/G CR (ref 0–25)

## 2024-03-27 DIAGNOSIS — R80.9 PROTEINURIA: ICD-10-CM

## 2024-03-27 DIAGNOSIS — N18.2 CKD (CHRONIC KIDNEY DISEASE) STAGE 2, GFR 60-89 ML/MIN: Primary | ICD-10-CM

## 2024-04-02 ENCOUNTER — OFFICE VISIT (OUTPATIENT)
Dept: NEPHROLOGY | Facility: CLINIC | Age: 29
End: 2024-04-02
Payer: COMMERCIAL

## 2024-04-02 ENCOUNTER — LAB (OUTPATIENT)
Dept: LAB | Facility: CLINIC | Age: 29
End: 2024-04-02
Payer: COMMERCIAL

## 2024-04-02 VITALS
OXYGEN SATURATION: 97 % | BODY MASS INDEX: 18.3 KG/M2 | WEIGHT: 121.3 LBS | SYSTOLIC BLOOD PRESSURE: 128 MMHG | HEART RATE: 76 BPM | DIASTOLIC BLOOD PRESSURE: 82 MMHG

## 2024-04-02 DIAGNOSIS — R80.1 PERSISTENT PROTEINURIA: Primary | ICD-10-CM

## 2024-04-02 DIAGNOSIS — R80.9 PROTEINURIA: ICD-10-CM

## 2024-04-02 DIAGNOSIS — N18.2 CKD (CHRONIC KIDNEY DISEASE) STAGE 2, GFR 60-89 ML/MIN: ICD-10-CM

## 2024-04-02 LAB
ALBUMIN MFR UR ELPH: 75.3 MG/DL
ALBUMIN SERPL BCG-MCNC: 4.5 G/DL (ref 3.5–5.2)
ALBUMIN UR-MCNC: 70 MG/DL
ANION GAP SERPL CALCULATED.3IONS-SCNC: 10 MMOL/L (ref 7–15)
APPEARANCE UR: CLEAR
BILIRUB UR QL STRIP: NEGATIVE
BUN SERPL-MCNC: 20.8 MG/DL (ref 6–20)
CALCIUM SERPL-MCNC: 9.2 MG/DL (ref 8.6–10)
CHLORIDE SERPL-SCNC: 104 MMOL/L (ref 98–107)
COLOR UR AUTO: ABNORMAL
CREAT SERPL-MCNC: 0.85 MG/DL (ref 0.51–0.95)
CREAT UR-MCNC: 164 MG/DL
CREAT UR-MCNC: 170 MG/DL
DEPRECATED HCO3 PLAS-SCNC: 27 MMOL/L (ref 22–29)
EGFRCR SERPLBLD CKD-EPI 2021: >90 ML/MIN/1.73M2
ERYTHROCYTE [DISTWIDTH] IN BLOOD BY AUTOMATED COUNT: 12.4 % (ref 10–15)
GLUCOSE SERPL-MCNC: 121 MG/DL (ref 70–99)
GLUCOSE UR STRIP-MCNC: NEGATIVE MG/DL
HCT VFR BLD AUTO: 40.5 % (ref 35–47)
HGB BLD-MCNC: 12.9 G/DL (ref 11.7–15.7)
HGB UR QL STRIP: NEGATIVE
KETONES UR STRIP-MCNC: NEGATIVE MG/DL
LEUKOCYTE ESTERASE UR QL STRIP: ABNORMAL
MCH RBC QN AUTO: 29.6 PG (ref 26.5–33)
MCHC RBC AUTO-ENTMCNC: 31.9 G/DL (ref 31.5–36.5)
MCV RBC AUTO: 93 FL (ref 78–100)
MICROALBUMIN UR-MCNC: 392 MG/L
MICROALBUMIN/CREAT UR: 239.02 MG/G CR (ref 0–25)
MUCOUS THREADS #/AREA URNS LPF: PRESENT /LPF
NITRATE UR QL: NEGATIVE
PH UR STRIP: 5.5 [PH] (ref 5–7)
PHOSPHATE SERPL-MCNC: 4 MG/DL (ref 2.5–4.5)
PLATELET # BLD AUTO: 290 10E3/UL (ref 150–450)
POTASSIUM SERPL-SCNC: 4.1 MMOL/L (ref 3.4–5.3)
PROT/CREAT 24H UR: 0.44 MG/MG CR (ref 0–0.2)
RBC # BLD AUTO: 4.36 10E6/UL (ref 3.8–5.2)
RBC #/AREA URNS AUTO: ABNORMAL /HPF
SKIP: ABNORMAL
SODIUM SERPL-SCNC: 141 MMOL/L (ref 135–145)
SP GR UR STRIP: 1.02 (ref 1–1.03)
SQUAMOUS #/AREA URNS AUTO: ABNORMAL /LPF
TRANS CELLS #/AREA URNS HPF: ABNORMAL /HPF
UROBILINOGEN UR STRIP-MCNC: NORMAL MG/DL
WBC # BLD AUTO: 9.3 10E3/UL (ref 4–11)
WBC #/AREA URNS AUTO: ABNORMAL /HPF

## 2024-04-02 PROCEDURE — 82043 UR ALBUMIN QUANTITATIVE: CPT

## 2024-04-02 PROCEDURE — 99214 OFFICE O/P EST MOD 30 MIN: CPT | Performed by: INTERNAL MEDICINE

## 2024-04-02 PROCEDURE — 82570 ASSAY OF URINE CREATININE: CPT

## 2024-04-02 PROCEDURE — 85027 COMPLETE CBC AUTOMATED: CPT

## 2024-04-02 PROCEDURE — 80069 RENAL FUNCTION PANEL: CPT

## 2024-04-02 PROCEDURE — 84156 ASSAY OF PROTEIN URINE: CPT

## 2024-04-02 PROCEDURE — 81001 URINALYSIS AUTO W/SCOPE: CPT

## 2024-04-02 PROCEDURE — 36415 COLL VENOUS BLD VENIPUNCTURE: CPT

## 2024-04-02 RX ORDER — LABETALOL 100 MG/1
100 TABLET, FILM COATED ORAL 2 TIMES DAILY
Qty: 180 TABLET | Refills: 11 | Status: SHIPPED | OUTPATIENT
Start: 2024-04-02

## 2024-04-02 NOTE — PATIENT INSTRUCTIONS
It was a pleasure taking care of you today.  I've included a brief summary of our discussion and care plan from today's visit below.  Please review this information with your primary care provider.     My recommendations are summarized as follows:  -Will start labetalol 100 mg twice daily. Please continue to check your BP at home. BP around 100-110 is acceptable, unless you feel dizzy  -Ok to get pregnant.    Who do I call with any questions after my visit?  Please be in touch if there are any further questions that arise following today's visit.  There are multiple ways to contact your nephrology care team.       During business hours, you may reach your Nephrology Care Team or schedule or reschedule an appointment or lab at [phone].       If you need to schedule imaging, please call [phone].   To schedule a COVID test, please call [phone].     You can always send a secure message through [employer]. [employer] messages are answered by your nurse or doctor typically within 24-48 hours. Please allow extra time on weekends and holidays.       For urgent/emergent questions after business hours, you may reach the on-call Nephrology Fellow by contacting the [facility]  at [phone].     How will I get the results of any tests ordered?    You will receive all of your results.  If you have signed up for [employer], any tests ordered at your visit will be available to you once resulted on Live [employer] Networkhart. Typically the physician reviews them and may or may not make further recommendations. If there are urgent results that require a change in your care plan, your physician or nurse will call you to discuss the next steps. If you are not on GetMaidt, a letter may be generated and mailed to you with your results.

## 2024-04-02 NOTE — PROGRESS NOTES
"I was asked to see this patient by Edel Rebollar    CC:  -CKD stage I A2  -HTN secondary to CKD  -Probable Preeclampsia     HPI:  Charisma Valencia is a very pleasant 28 year old female  who presents for Follow-up on proteinuric CKD. She is now  after the delivery of her daughter \"Marcellus\" on 23.     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 She has been previously followed by Dr. Lorenzo Houston in the in the [facility].  She has a history of long-standing asymptomatic non-nephrotic range isolated proteinuria and  hypertension. Her last renal ultrasound (May, 2016) revealed a right kidney of 6.3 cm (with little growth by comparison) and left kidney 11.6 cm.     She denies any personal or family history of kidney stones or CKD/dialysis/kidney transplant. Her prenatal history is unremarkable as reported. No frequent urinary tract infections during her childhood.    In 2022, her lisinopril was stopped in anticipation of Pregnancy and she was started on nifedipine. Unfortunately, nifedipine caused her headache so she elected to stop it. During her visit in , her BP was elevated and she was slightly tachycardic so she was started on labetalol 100 mg twice daily. At subsequent clinic follow-up at 38w she was found to be hypertensive with an increase in creatinine. Oddly, proteinuria remained <0.3g/g but clinical picture concerning for preecclampsia prompting admission and induction for delivery. She delivered a healthy baby girl on 2023.     Since delivery, her blood pressure has improved. She initially required labetalol immediately after delivery but has since weaned off. She reports home BP measurements of around 115-125/70 in recent weeks.     She and her  are planning to have a second pregnancy beginning of summer 2023. She is currently using condoms as contraception. " She is breast-feeding.    She is a  for middle school    Wt Readings from Last 4 Encounters:   04/02/24 55 kg (121 lb 4.8 oz)   01/02/24 55.9 kg (123 lb 3.2 oz)   11/08/23 58.7 kg (129 lb 8 oz)   10/09/23 59.9 kg (132 lb)     Wt Readings from Last 4 Encounters:   04/02/24 55 kg (121 lb 4.8 oz)   01/02/24 55.9 kg (123 lb 3.2 oz)   11/08/23 58.7 kg (129 lb 8 oz)   10/09/23 59.9 kg (132 lb)     No Known Allergies    Current Outpatient Medications   Medication Sig Dispense Refill    clotrimazole (LOTRIMIN) 1 % external cream Apply topically 2 times daily 30 g 0    Prenatal Vit-Fe Fumarate-FA (PRENATAL MULTIVITAMIN W/IRON) 27-0.8 MG tablet Take 1 tablet by mouth daily       No current facility-administered medications for this visit.     Fhx: HTN, CKD    Past Surgical History:   Procedure Laterality Date    WISDOM TOOTH EXTRACTION         Social History     Tobacco Use    Smoking status: Never    Smokeless tobacco: Never   Vaping Use    Vaping Use: Never used   Substance Use Topics    Alcohol use: Not Currently    Drug use: Never     ROS: A 12 system review of systems was negative other than noted here or above.     Exam:  /82 (BP Location: Left arm, Patient Position: Chair, Cuff Size: Adult Regular)   Pulse 76   Wt 55 kg (121 lb 4.8 oz)   SpO2 97%   BMI 18.30 kg/m    BP Readings from Last 6 Encounters:   04/02/24 128/82   01/02/24 128/83   11/08/23 124/74   10/09/23 122/72   09/25/23 122/80   09/19/23 (!) 144/97     Wt Readings from Last 4 Encounters:   04/02/24 55 kg (121 lb 4.8 oz)   01/02/24 55.9 kg (123 lb 3.2 oz)   11/08/23 58.7 kg (129 lb 8 oz)   10/09/23 59.9 kg (132 lb)     EYES: PERRL  HENT: mouth without ulcers or lesions  NECK: supple, no adenopathy  RESP: lungs clear to auscultation - no rales, rhonchi or wheezes  CV: regular rhythm, normal rate, no rub   ABDOMEN:  soft, nontender, no HSM or masses and bowel sounds normal  MS: extremities normal- no gross deformities noted, no evidence  of inflammation in joints, no muscle tenderness  SKIN: no rash  NEURO: Normal strength and tone, sensory exam grossly normal, mentation intact and speech normal  PSYCH: mentation appears normal. and affect normal/bright  No LE edema    Results:reviewed in details with the patient    ASSESSMENT AND RECOMMENDATIONS:   1.  Hx of preeclampsia September 2023:  Noted to have elevated blood pressure prior to delivery requiring labetalol for management. Ultimately seen at 38 weeks and noted to have continued hypertension with an elevated creatinine concerning for development of preeclampsia and advised further evaluation at L&D. On presentation to labor and delivery, she was again noted to have laboratory abnormalities suggesting renal dysfunction with increased creatinine, elevated uric acid.  Repeat Urine protein showed an elevated protein but undetectable creatinine on collected sample so a urine protein:creatinine ratio was unable to be calculated. Ultimately started on magnesium during delivery. She delivered a healthy baby girl through a normal vaginal delivery and reports no delivery related complications.     2. 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 Of note, her iohexol in 2012 was 76 ml/min (when Cr was 0.9 mg/dL). Currently, her UPCR is at 0.44 g/g. She is planning to get pregnant early summer so will continue to hold on any RAAS blockade at this time.  -will start her on labetolol 100 mg bid as her BP is slightly above average given her age and gender.  -Recheck labs in 3 months    3. HTN:   -will start her on labetolol 100 mg bid as her BP is slightly above average given her age and gender.    Follow-up in 3 months    The total time of this encounter amounted to 30 minutes on the day of the encounter 4/2/2024. This time included face to face time spent with the patient, preparatory work and chart review, ordering tests, and performing post visit documentation.      *This  dictation was prepared in part using Dragon recognition software.  As a result errors may occur. When identified these transcription errors have been corrected.  While every attempt is made to correct errors during dictation, errors may still exist.     Barry Barrios MD   [facility]   Department of Medicine   Division of Renal Disease and Hypertension

## 2024-04-02 NOTE — NURSING NOTE
Charisma Mathew's goals for this visit include:   Chief Complaint   Patient presents with    RECHECK     3 month follow up CKD        She requests these members of her care team be copied on today's visit information: yes     PCP: Yvette Cedeno    Referring Provider:  No referring provider defined for this encounter.    /82 (BP Location: Left arm, Patient Position: Chair, Cuff Size: Adult Regular)   Pulse 76   Wt 55 kg (121 lb 4.8 oz)   SpO2 97%   BMI 18.30 kg/m      Do you need any medication refills at today's visit? No     TRICIA Evans   Neph/Pulm [facility]

## 2024-04-12 ENCOUNTER — MEDICAL CORRESPONDENCE (OUTPATIENT)
Dept: HEALTH INFORMATION MANAGEMENT | Facility: CLINIC | Age: 29
End: 2024-04-12
Payer: COMMERCIAL

## 2024-07-16 ENCOUNTER — DOCUMENTATION ONLY (OUTPATIENT)
Dept: NEPHROLOGY | Facility: CLINIC | Age: 29
End: 2024-07-16
Payer: COMMERCIAL

## 2024-07-16 NOTE — PROGRESS NOTES
This patient has an appointment for lab work but does not have any orders. Please place some future orders as needed.    Thank You,  Samaria Hyde MLT (ASCP)

## 2024-07-18 DIAGNOSIS — R80.9 PROTEINURIA: ICD-10-CM

## 2024-07-18 DIAGNOSIS — N18.2 CKD (CHRONIC KIDNEY DISEASE) STAGE 2, GFR 60-89 ML/MIN: Primary | ICD-10-CM

## 2024-07-19 ENCOUNTER — MYC MEDICAL ADVICE (OUTPATIENT)
Dept: NEPHROLOGY | Facility: CLINIC | Age: 29
End: 2024-07-19
Payer: COMMERCIAL

## 2024-07-23 ENCOUNTER — LAB (OUTPATIENT)
Dept: LAB | Facility: CLINIC | Age: 29
End: 2024-07-23
Payer: COMMERCIAL

## 2024-07-23 ENCOUNTER — OFFICE VISIT (OUTPATIENT)
Dept: NEPHROLOGY | Facility: CLINIC | Age: 29
End: 2024-07-23
Payer: COMMERCIAL

## 2024-07-23 VITALS
DIASTOLIC BLOOD PRESSURE: 73 MMHG | SYSTOLIC BLOOD PRESSURE: 130 MMHG | OXYGEN SATURATION: 96 % | HEART RATE: 95 BPM | BODY MASS INDEX: 18.28 KG/M2 | WEIGHT: 121.2 LBS

## 2024-07-23 DIAGNOSIS — R80.9 PROTEINURIA: ICD-10-CM

## 2024-07-23 DIAGNOSIS — N18.2 CKD (CHRONIC KIDNEY DISEASE) STAGE 2, GFR 60-89 ML/MIN: Primary | ICD-10-CM

## 2024-07-23 DIAGNOSIS — R80.1 PERSISTENT PROTEINURIA: ICD-10-CM

## 2024-07-23 DIAGNOSIS — N18.2 CKD (CHRONIC KIDNEY DISEASE) STAGE 2, GFR 60-89 ML/MIN: ICD-10-CM

## 2024-07-23 LAB
ALBUMIN SERPL BCG-MCNC: 4.7 G/DL (ref 3.5–5.2)
ALBUMIN UR-MCNC: 20 MG/DL
ANION GAP SERPL CALCULATED.3IONS-SCNC: 15 MMOL/L (ref 7–15)
APPEARANCE UR: CLEAR
BILIRUB UR QL STRIP: NEGATIVE
BUN SERPL-MCNC: 19.1 MG/DL (ref 6–20)
CALCIUM SERPL-MCNC: 9.5 MG/DL (ref 8.8–10.4)
CHLORIDE SERPL-SCNC: 104 MMOL/L (ref 98–107)
COLOR UR AUTO: COLORLESS
CREAT SERPL-MCNC: 0.9 MG/DL (ref 0.51–0.95)
CREAT UR-MCNC: 40.6 MG/DL
EGFRCR SERPLBLD CKD-EPI 2021: 88 ML/MIN/1.73M2
GLUCOSE SERPL-MCNC: 108 MG/DL (ref 70–99)
GLUCOSE UR STRIP-MCNC: NEGATIVE MG/DL
HCO3 SERPL-SCNC: 23 MMOL/L (ref 22–29)
HGB UR QL STRIP: NEGATIVE
KETONES UR STRIP-MCNC: NEGATIVE MG/DL
LEUKOCYTE ESTERASE UR QL STRIP: NEGATIVE
MICROALBUMIN UR-MCNC: 157 MG/L
MICROALBUMIN/CREAT UR: 386.7 MG/G CR (ref 0–25)
NITRATE UR QL: NEGATIVE
PH UR STRIP: 6 [PH] (ref 5–7)
PHOSPHATE SERPL-MCNC: 3.1 MG/DL (ref 2.5–4.5)
POTASSIUM SERPL-SCNC: 3.9 MMOL/L (ref 3.4–5.3)
PTH-INTACT SERPL-MCNC: 18 PG/ML (ref 15–65)
RBC #/AREA URNS AUTO: ABNORMAL /HPF
SKIP: ABNORMAL
SODIUM SERPL-SCNC: 142 MMOL/L (ref 135–145)
SP GR UR STRIP: 1.01 (ref 1–1.03)
SQUAMOUS #/AREA URNS AUTO: ABNORMAL /LPF
UROBILINOGEN UR STRIP-MCNC: NORMAL MG/DL
VIT D+METAB SERPL-MCNC: 45 NG/ML (ref 20–50)
WBC #/AREA URNS AUTO: ABNORMAL /HPF

## 2024-07-23 PROCEDURE — 82306 VITAMIN D 25 HYDROXY: CPT

## 2024-07-23 PROCEDURE — 36415 COLL VENOUS BLD VENIPUNCTURE: CPT

## 2024-07-23 PROCEDURE — 82043 UR ALBUMIN QUANTITATIVE: CPT

## 2024-07-23 PROCEDURE — 82570 ASSAY OF URINE CREATININE: CPT

## 2024-07-23 PROCEDURE — 81001 URINALYSIS AUTO W/SCOPE: CPT

## 2024-07-23 PROCEDURE — 80069 RENAL FUNCTION PANEL: CPT

## 2024-07-23 PROCEDURE — G2211 COMPLEX E/M VISIT ADD ON: HCPCS | Performed by: INTERNAL MEDICINE

## 2024-07-23 PROCEDURE — 83970 ASSAY OF PARATHORMONE: CPT

## 2024-07-23 PROCEDURE — 99214 OFFICE O/P EST MOD 30 MIN: CPT | Performed by: INTERNAL MEDICINE

## 2024-07-23 NOTE — NURSING NOTE
Charisma Mathew's goals for this visit include:   Chief Complaint   Patient presents with    RECHECK     Follow up CKD        She requests these members of her care team be copied on today's visit information: yes     PCP: Yvette Cedeno    Referring Provider:  Referred Self, MD  No address on file    /73 (BP Location: Right arm, Patient Position: Chair, Cuff Size: Adult Regular)   Pulse 95   Wt 55 kg (121 lb 3.2 oz)   SpO2 96%   BMI 18.28 kg/m      Do you need any medication refills at today's visit? No     TRICIA Evans   Neph/Pulm [facility]

## 2024-07-23 NOTE — PATIENT INSTRUCTIONS
It was a pleasure taking care of you today.  I've included a brief summary of our discussion and care plan from today's visit below.  Please review this information with your primary care provider.     My recommendations are summarized as follows:  -your Bps looks great. Continue labetalol    Who do I call with any questions after my visit?  Please be in touch if there are any further questions that arise following today's visit.  There are multiple ways to contact your nephrology care team.       During business hours, you may reach your Nephrology Care Team or schedule or reschedule an appointment or lab at [phone].       If you need to schedule imaging, please call [phone].   To schedule a COVID test, please call [phone].     You can always send a secure message through Fannect. Fannect messages are answered by your nurse or doctor typically within 24-48 hours. Please allow extra time on weekends and holidays.       For urgent/emergent questions after business hours, you may reach the on-call Nephrology Fellow by contacting the [facility]  at [phone].     How will I get the results of any tests ordered?    You will receive all of your results.  If you have signed up for Fannect, any tests ordered at your visit will be available to you once resulted on Soshowiset. Typically the physician reviews them and may or may not make further recommendations. If there are urgent results that require a change in your care plan, your physician or nurse will call you to discuss the next steps. If you are not on Soshowiset, a letter may be generated and mailed to you with your results.

## 2024-07-23 NOTE — PROGRESS NOTES
"I was asked to see this patient by Edel Rebollar    CC:  -CKD stage I A2  -HTN secondary to CKD  -Probable Preeclampsia     HPI:  Charisma Valencia is a very pleasant 29 year old female  who presents for Follow-up on proteinuric CKD. She is now  after the delivery of her daughter \"Marcellus\" on 23. Her daughter is with here today.    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 She has been previously followed by Dr. Lorenzo Houston in the [facility].  She has a history of long-standing asymptomatic non-nephrotic range isolated proteinuria and  hypertension. Her renal ultrasound on May, 2016 revealed a right kidney of 6.3 cm (with little growth by comparison) and left kidney 11.6 cm.     She denies any personal or family history of kidney stones or CKD/dialysis/kidney transplant. Her prenatal history is unremarkable as reported. No frequent urinary tract infections during her childhood.    In 2022, her lisinopril was stopped in anticipation of Pregnancy and she was started on nifedipine. Unfortunately, nifedipine caused her headache so she elected to stop it. During her visit in , her BP was elevated and she was slightly tachycardic so she was started on labetalol 100 mg twice daily. At subsequent clinic follow-up at 38w she was found to be hypertensive with an increase in creatinine. Oddly, proteinuria remained <0.3g/g but clinical picture concerning for preecclampsia prompting admission and induction for delivery. She delivered a healthy baby girl on 2023.     She is on labetalol 100 mg bid. His -11/70.    She and her  are planning to have a second pregnancy beginning of summer [id number].  She is currently using condoms as contraception. She is breast-feeding.    She is a  for middle school    Wt Readings from Last 4 Encounters:   24 55 kg (121 lb " 3.2 oz)   04/02/24 55 kg (121 lb 4.8 oz)   01/02/24 55.9 kg (123 lb 3.2 oz)   11/08/23 58.7 kg (129 lb 8 oz)     Wt Readings from Last 4 Encounters:   07/23/24 55 kg (121 lb 3.2 oz)   04/02/24 55 kg (121 lb 4.8 oz)   01/02/24 55.9 kg (123 lb 3.2 oz)   11/08/23 58.7 kg (129 lb 8 oz)     No Known Allergies    Current Outpatient Medications   Medication Sig Dispense Refill    labetalol (NORMODYNE) 100 MG tablet Take 1 tablet (100 mg) by mouth 2 times daily 180 tablet 11     No current facility-administered medications for this visit.     Fhx: HTN, CKD    Past Surgical History:   Procedure Laterality Date    WISDOM TOOTH EXTRACTION         Social History     Tobacco Use    Smoking status: Never    Smokeless tobacco: Never   Vaping Use    Vaping status: Never Used   Substance Use Topics    Alcohol use: Not Currently    Drug use: Never     ROS: A 12 system review of systems was negative other than noted here or above.     Exam:  /73 (BP Location: Right arm, Patient Position: Chair, Cuff Size: Adult Regular)   Pulse 95   Wt 55 kg (121 lb 3.2 oz)   SpO2 96%   BMI 18.28 kg/m    BP Readings from Last 6 Encounters:   07/23/24 130/73   04/02/24 128/82   01/02/24 128/83   11/08/23 124/74   10/09/23 122/72   09/25/23 122/80     Wt Readings from Last 4 Encounters:   07/23/24 55 kg (121 lb 3.2 oz)   04/02/24 55 kg (121 lb 4.8 oz)   01/02/24 55.9 kg (123 lb 3.2 oz)   11/08/23 58.7 kg (129 lb 8 oz)     EYES: PERRL  HENT: mouth without ulcers or lesions  NECK: supple, no adenopathy  RESP: lungs clear to auscultation - no rales, rhonchi or wheezes  CV: regular rhythm, normal rate, no rub   ABDOMEN:  soft, nontender, no HSM or masses and bowel sounds normal  MS: extremities normal- no gross deformities noted, no evidence of inflammation in joints, no muscle tenderness  SKIN: no rash  NEURO: Normal strength and tone, sensory exam grossly normal, mentation intact and speech normal  PSYCH: mentation appears normal. and affect  normal/bright  No LE edema    Results:reviewed in details with the patient    ASSESSMENT AND RECOMMENDATIONS:   1.  Hx of preeclampsia September 2023:  Noted to have elevated blood pressure prior to delivery requiring labetalol for management. Ultimately seen at 38 weeks and noted to have continued hypertension with an elevated creatinine concerning for development of preeclampsia and advised further evaluation at L&D. On presentation to labor and delivery, she was again noted to have laboratory abnormalities suggesting renal dysfunction with increased creatinine, elevated uric acid.  Repeat Urine protein showed an elevated protein but undetectable creatinine on collected sample so a urine protein:creatinine ratio was unable to be calculated. Ultimately started on magnesium during delivery. She delivered a healthy baby girl through a normal vaginal delivery and reports no delivery related complications.     2. 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 Of note, her iohexol in 2012 was 76 ml/min (when Cr was 0.9 mg/dL). Currently, her UACR is at 386 g/g. She is planning to get pregnant so will continue to hold on any RAAS blockade at this time.  -will continue labetolol 100 mg bid.     3. HTN:   -BP at goal  -will continue labetolol 100 mg bid     Follow-up in 6 months    The total time of this encounter amounted to 30 minutes on the day of the encounter 7/23/2024. This time included face to face time spent with the patient, preparatory work and chart review, ordering tests, and performing post visit documentation.    *This dictation was prepared in part using Dragon recognition software.  As a result errors may occur. When identified these transcription errors have been corrected.  While every attempt is made to correct errors during dictation, errors may still exist.     Barry Barrios MD   [facility]   Department of Medicine   Division of Renal  Disease and Hypertension

## 2024-09-06 ENCOUNTER — MEDICAL CORRESPONDENCE (OUTPATIENT)
Dept: HEALTH INFORMATION MANAGEMENT | Facility: CLINIC | Age: 29
End: 2024-09-06
Payer: COMMERCIAL

## 2024-11-01 ENCOUNTER — VIRTUAL VISIT (OUTPATIENT)
Dept: OBGYN | Facility: CLINIC | Age: 29
End: 2024-11-01
Payer: COMMERCIAL

## 2024-11-01 DIAGNOSIS — O16.1 ELEVATED BLOOD PRESSURE AFFECTING PREGNANCY IN FIRST TRIMESTER, ANTEPARTUM: Primary | ICD-10-CM

## 2024-11-01 PROCEDURE — 99207 PR NO CHARGE NURSE ONLY: CPT | Mod: 93

## 2024-11-01 RX ORDER — SENNOSIDES A AND B 8.6 MG/1
8.6-17.2 TABLET, FILM COATED ORAL
COMMUNITY
Start: 2023-09-23

## 2024-11-01 RX ORDER — CIPROFLOXACIN HYDROCHLORIDE 3.5 MG/ML
SOLUTION/ DROPS TOPICAL
COMMUNITY
Start: 2024-06-01

## 2024-11-01 RX ORDER — AMLODIPINE BESYLATE 10 MG/1
10 TABLET ORAL DAILY
COMMUNITY

## 2024-11-01 RX ORDER — ACETAMINOPHEN 500 MG
1000 TABLET ORAL EVERY 6 HOURS
COMMUNITY
Start: 2023-09-23

## 2024-11-01 NOTE — PROGRESS NOTES
Telephone visit with patient for New Prenatal Intake and Education. This is patient's second pregnancy. Handouts reviewed and will be provided at next prenatal appointment. Scheduled for New Prenatal with Dr. Nevarez on 11/11/2024.    Hx CHTN, currently taking labetalol 100 mg BID.       Prenatal OB Questionnaire  Patient supplied answers from flow sheet for:  Prenatal OB Questionnaire.  Past Medical History  Have you ever recieved care for your mental health? : (Patient-Rptd) No  Have you ever been in a major accident or suffered serious trauma?: (Patient-Rptd) No  Within the last year, has anyone hit, slapped, kicked or otherwise hurt you?: (Patient-Rptd) No  In the last year, has anyone forced you to have sex when you didn't want to?: (Patient-Rptd) No    Past Medical History 2   Have you ever received a blood transfusion?: (Patient-Rptd) No  Would you accept a blood transfusion if was medically recommended?: (Patient-Rptd) Yes  Does anyone in your home smoke?: (Patient-Rptd) No   Is your blood type Rh negative?: (Patient-Rptd) Unknown  Have you ever ?: (!) (Patient-Rptd) Yes  Have you been hospitalized for a nonsurgical reason excluding normal delivery?: (Patient-Rptd) No  Have you ever had an abnormal pap smear?: (Patient-Rptd) No    Past Medical History (Continued)  Do you have a history of abnormalities of the uterus?: (Patient-Rptd) No  Did your mother take JULIANNE or any other hormones when she was pregnant with you?: (Patient-Rptd) Unknown  Do you have any other problems we have not asked about which you feel may be important to this pregnancy?: (Patient-Rptd) No    PHQ-2 Score:         1/2/2024     4:16 PM 3/8/2023     7:07 PM   PHQ-2 ( 1999 [employer])   Q1: Little interest or pleasure in doing things 0 0    Q2: Feeling down, depressed or hopeless 0 0    PHQ-2 Score 0 0   Q1: Little interest or pleasure in doing things  Not at all   Q2: Feeling down, depressed or hopeless  Not at all   PHQ-2 Score  0        Patient-reported           Allergies as of 11/1/2024:    Allergies as of 11/01/2024    (No Known Allergies)       Current medications are:  Current Outpatient Medications   Medication Sig Dispense Refill    labetalol (NORMODYNE) 100 MG tablet Take 1 tablet (100 mg) by mouth 2 times daily 180 tablet 11    Prenatal Multivit-Min-Fe-FA (PRENATAL 1 + IRON OR) Take 1 tablet by mouth.      acetaminophen (TYLENOL) 500 MG tablet Take 1,000 mg by mouth every 6 hours. (Patient not taking: Reported on 11/1/2024)      amLODIPine (NORVASC) 10 MG tablet Take 10 mg by mouth daily. (Patient not taking: Reported on 11/1/2024)      amoxicillin-clavulanate (AUGMENTIN) 875-125 MG tablet Take 1 tablet by mouth 2 times daily. (Patient not taking: Reported on 11/1/2024)      ciprofloxacin (CILOXAN) 0.3 % ophthalmic solution 1-2 DROPS IN AFFECTED EYE EVERY 2HRS WHILE AWAKE X2 DAYS THEN EVERY 4HRS WHILE AWAKE FOR NEXT 5 DAYS (Patient not taking: Reported on 11/1/2024)      senna (SENOKOT) 8.6 MG tablet Take 8.6-17.2 mg by mouth. (Patient not taking: Reported on 11/1/2024)

## 2024-11-01 NOTE — PATIENT INSTRUCTIONS
Learning About Pregnancy  Your Care Instructions     Your health in the early weeks of your pregnancy is particularly important for your baby's health. Take good care of yourself. Anything you do that harms your body can also harm your baby.  Make sure to go to all of your doctor appointments. Regular checkups will help keep you and your baby healthy.  How can you care for yourself at home?  Diet    Choose healthy foods like fruits, vegetables, whole grains, lean proteins, and healthy fats.     Choose foods that are good sources of calcium, iron, and folate. You can try dairy products, dark leafy greens, fortified orange juice and cereals, almonds, broccoli, dried fruit, and beans.     Do not skip meals or go for many hours without eating. If you are nauseated, try to eat a small, healthy snack every 2 to 3 hours.     Avoid fish that are high in mercury. These include shark, swordfish, willard mackerel, marlin, orange roughy, and bigeye tuna, as well as tilefish from the [facility].     It's okay to eat up to 8 to 12 ounces a week of fish that are low in mercury or up to 4 ounces a week of fish that have medium levels of mercury. Some fish that are low in mercury are salmon, shrimp, canned light tuna, cod, and tilapia. Some fish that have medium levels of mercury are halibut and white albacore tuna.     Drink plenty of fluids. If you have kidney, heart, or liver disease and have to limit fluids, talk with your doctor before you increase the amount of fluids you drink.     Limit caffeine to about 200 to 300 mg per day. On average, a cup of brewed coffee has around 80 to 100 mg of caffeine.     Do not drink alcohol, such as beer, wine, or hard liquor.     Take a multivitamin that contains at least 400 micrograms (mcg) of folic acid to help prevent birth defects. Fortified cereal and whole wheat bread are good additional sources of folic acid.     Increase the calcium in your diet. Try to drink a quart of skim milk  each day. You may also take calcium supplements and choose foods such as cheese and yogurt.   Lifestyle    Make sure you go to your follow-up appointments.     Get plenty of rest. You may be unusually tired while you are pregnant.     Get at least 30 minutes of exercise on most days of the week. Walking is a good choice. If you have not exercised in the past, start out slowly. Take several short walks each day.     Do not smoke. If you need help quitting, talk to your doctor about stop-smoking programs. These can increase your chances of quitting for good.     Do not touch cat feces or litter boxes. Also, wash your hands after you handle raw meat, and fully cook all meat before you eat it. Wear gloves when you work in the yard or garden, and wash your hands well when you are done. Cat feces, raw or undercooked meat, and contaminated dirt can cause an infection that may harm your baby or lead to a miscarriage.     Avoid things that can make your body too hot and may be harmful to your baby, such as a hot tub or sauna. Or talk with your doctor before doing anything that raises your body temperature. Your doctor can tell you if it's safe.     Avoid chemical fumes, paint fumes, or poisons.     Do not use illegal drugs, marijuana, or alcohol.   Medicines    Review all of your medicines with your doctor. Some of your routine medicines may need to be changed to protect your baby.     Use acetaminophen (Tylenol) to relieve minor problems, such as a mild headache or backache or a mild fever with cold symptoms. Do not use nonsteroidal anti-inflammatory drugs (NSAIDs), such as ibuprofen (Advil, Motrin) or naproxen (Aleve), unless your doctor says it is okay.     Do not take two or more pain medicines at the same time unless the doctor told you to. Many pain medicines have acetaminophen, which is Tylenol. Too much acetaminophen (Tylenol) can be harmful.     Take your medicines exactly as prescribed. Call your doctor if you  "think you are having a problem with your medicine.   To manage morning sickness    Keep food in your stomach, but not too much at once. Try eating five or six small meals a day instead of three large meals.     For nausea when you wake up, eat a small snack, such as a couple of crackers or pretzels, before rising. Allow a few minutes for your stomach to settle before you slowly get up.     Try to avoid smells and foods that make you feel nauseated. High-fat or greasy foods, milk, and coffee may make nausea worse. Some foods that may be easier to tolerate include cold, spicy, sour, and salty foods.     Drink enough fluids. Water and other caffeine-free drinks are good choices.     Take your prenatal vitamins at night on a full stomach.     Try foods and drinks made with ally. Ally may help with nausea.     Get lots of rest. Morning sickness may be worse when you are tired.     Talk to your doctor about over-the-counter products, such as vitamin B6 or doxylamine, to help relieve symptoms.     Try a P6 acupressure wrist band. These anti-nausea wristbands help some people.   Follow-up care is a key part of your treatment and safety. Be sure to make and go to all appointments, and call your doctor if you are having problems. It's also a good idea to know your test results and keep a list of the medicines you take.  Where can you learn more?  Go to https://[email or web address] [email or web address]/patiented  Enter E868 in the search box to learn more about \"Learning About Pregnancy.\"  Current as of: July 10, 2023  Content Version: 14.2 2024 [facility] [employer].   Care instructions adapted under license by your healthcare professional. If you have questions about a medical condition or this instruction, always ask your healthcare professional. Healthwise, Incorporated disclaims any warranty or liability for your use of this information.    Weeks 6 to 10 of Your Pregnancy: Care Instructions  During these weeks of pregnancy, your body " "goes through many changes. You may start to feel different, both in your body and your emotions. Each pregnancy is different, so there's no \"right\" way to feel. These early weeks are a time to make healthy choices for you and your pregnancy.    Take a daily prenatal vitamin. Choose one with folic acid in it.   Avoid alcohol, tobacco, and drugs (including marijuana). If you need help quitting, talk to your doctor.     Drink plenty of liquids.  Be sure to drink enough water. And limit sodas, other sweetened drinks, and caffeine.     Choose foods that are good sources of calcium, iron, and folate.  You can try dairy products, dark leafy greens, fortified orange juice and cereals, almonds, broccoli, dried fruit, and beans.     Avoid foods that may be harmful.  Don't eat raw meat, deli meat, raw seafood, or raw eggs. Avoid soft cheese and unpasteurized dairy, like Brie and blue cheese. And don't eat fish that contains a lot of mercury, like shark and swordfish.     Don't touch jose litter or cat poop.  They can cause an infection that could be harmful during pregnancy.     Avoid things that can make your body too hot.  For example, avoid hot tubs and saunas.     Soothe morning sickness.  Try eating 5 or 6 small meals a day, getting some fresh air, or using jose to control symptoms.     Ask your doctor about flu and COVID-19 shots.  Getting them can help protect against infection.   Follow-up care is a key part of your treatment and safety. Be sure to make and go to all appointments, and call your doctor if you are having problems. It's also a good idea to know your test results and keep a list of the medicines you take.  Where can you learn more?  Go to https://www.Dakwak.net/patiented  Enter G112 in the search box to learn more about \"Weeks 6 to 10 of Your Pregnancy: Care Instructions.\"  Current as of: July 10, 2023  Content Version: 14.2 2024 [facility] [employer].   Care instructions adapted under license " by your healthcare professional. If you have questions about a medical condition or this instruction, always ask your healthcare professional. [employer] disclaims any warranty or liability for your use of this information.       Pregnancy: Managing Morning Sickness (01:48)  Your health professional recommends that you watch this short online health video.  Learn how to manage morning sickness during pregnancy.   Purpose: Learn how to manage morning sickness during pregnancy.  Goal: Learn how to manage morning sickness during pregnancy.    Watch: Scan the QR code or visit the link to view video       [email or web address]  Current as of: July 10, 2023  Content Version: 14.2 2024 [employer].   Care instructions adapted under license by your healthcare professional. If you have questions about a medical condition or this instruction, always ask your healthcare professional. [employer] disclaims any warranty or liability for your use of this information.    Pregnancy and Heartburn: Care Instructions  Overview     Heartburn is a common problem during pregnancy.  Heartburn happens when stomach acid backs up into the tube that carries food to the stomach. This tube is called the esophagus. Early in pregnancy, heartburn is caused by hormone changes that slow down digestion. Later on, it's also caused by the large uterus pushing up on the stomach.  Even though you can't fix the cause, there are things you can do to get relief. Treating heartburn during pregnancy focuses first on making lifestyle changes, like changing what and how you eat, and on taking medicines.  Heartburn usually improves or goes away after childbirth.  Follow-up care is a key part of your treatment and safety. Be sure to make and go to all appointments, and call your doctor if you are having problems. It's also a good idea to know your test results and keep a list of the medicines you take.  How can  "you care for yourself at home?  Eat small, frequent meals.  Avoid foods that make your symptoms worse, such as chocolate, peppermint, and spicy foods. Avoid drinks with caffeine, such as coffee, tea, and sodas.  Avoid bending over or lying down after meals.  Take a short walk after you eat.  If heartburn is a problem at night, do not eat for 2 hours before bedtime.  Take antacids like Mylanta, Maalox, Rolaids, or Tums. Do not take antacids that have sodium bicarbonate, magnesium trisilicate, or aspirin. Be careful when you take over-the-counter antacid medicines. Many of these medicines have aspirin in them. While you are pregnant, do not take aspirin or medicines that contain aspirin unless your doctor says it is okay.  If you're not getting relief, talk to your doctor. You may be able to take a stronger acid-reducing medicine.  When should you call for help?   Call your doctor now or seek immediate medical care if:    You have new or worse belly pain.     You are vomiting.   Watch closely for changes in your health, and be sure to contact your doctor if:    You have new or worse symptoms of reflux.     You are losing weight.     You have trouble or pain swallowing.     You do not get better as expected.   Where can you learn more?  Go to https://www.VitaSensis.net/patiented  Enter U946 in the search box to learn more about \"Pregnancy and Heartburn: Care Instructions.\"  Current as of: July 10, 2023  Content Version: 14.2 2024 [facility] [employer].   Care instructions adapted under license by your healthcare professional. If you have questions about a medical condition or this instruction, always ask your healthcare professional. Healthwise, Incorporated disclaims any warranty or liability for your use of this information.    Constipation: Care Instructions  Overview     Constipation means that you have a hard time passing stools (bowel movements). People pass stools from 3 times a day to once every 3 days. " What is normal for you may be different. Constipation may occur with pain in the rectum and cramping. The pain may get worse when you try to pass stools. Sometimes there are small amounts of bright red blood on toilet paper or the surface of stools. This is because of enlarged veins near the rectum (hemorrhoids).  A few changes in your diet and lifestyle may help you avoid ongoing constipation. Your doctor may also prescribe medicine to help loosen your stool.  Some medicines can cause constipation. These include pain medicines and antidepressants. Tell your doctor about all the medicines you take. Your doctor may want to make a medicine change to ease your symptoms.  Follow-up care is a key part of your treatment and safety. Be sure to make and go to all appointments, and call your doctor if you are having problems. It's also a good idea to know your test results and keep a list of the medicines you take.  How can you care for yourself at home?  Drink plenty of fluids. If you have kidney, heart, or liver disease and have to limit fluids, talk with your doctor before you increase the amount of fluids you drink.  Include high-fiber foods in your diet each day. These include fruits, vegetables, beans, and whole grains.  Get at least 30 minutes of exercise on most days of the week. Walking is a good choice. You also may want to do other activities, such as running, swimming, cycling, or playing tennis or team sports.  Take a fiber supplement, such as Citrucel or Metamucil, every day. Read and follow all instructions on the label.  Schedule time each day for a bowel movement. A daily routine may help. Take your time having a bowel movement, but don't sit for more than 10 minutes at a time. And don't strain too much.  Support your feet with a small step stool when you sit on the toilet. This helps flex your hips and places your pelvis in a squatting position.  Your doctor may recommend an over-the-counter laxative to  "relieve your constipation. Examples are Milk of Magnesia and MiraLax. Read and follow all instructions on the label. Do not use laxatives on a long-term basis.  When should you call for help?   Call your doctor now or seek immediate medical care if:    You have new or worse belly pain.     You have new or worse nausea or vomiting.     You have blood in your stools.   Watch closely for changes in your health, and be sure to contact your doctor if:    Your constipation is getting worse.     You do not get better as expected.   Where can you learn more?  Go to https://[email or web address]  Enter P343 in the search box to learn more about \"Constipation: Care Instructions.\"  Current as of: October 19, 2023  Content Version: 14.2 2024 [employer].   Care instructions adapted under license by your healthcare professional. If you have questions about a medical condition or this instruction, always ask your healthcare professional. Healthwise, Incorporated disclaims any warranty or liability for your use of this information.    Learning About High-Iron Foods  What foods are high in iron?     The foods you eat contain nutrients, such as vitamins and minerals. Iron is a nutrient. Your body needs the right amount to stay healthy and work as it should. You can use the list below to help you make choices about which foods to eat.  Here are some foods that contain iron. They have 1 to 2 milligrams of iron per serving.  Fruits  Figs (dried), 5 figs  Vegetables  Asparagus (canned), 6 ferrari  [place]han, beet, Swiss chard, or turnip greens, 1 cup  Dried peas, cooked,   cup  Seaweed, spirulina (dried),   cup  Spinach, (cooked)   cup or (raw) 1 cup  Grains  Cereals, fortified with iron, 1 cup  Grits (instant, cooked), fortified with iron,   cup  Meats and other protein foods  Beans (kidney, lima, navy, white), canned or cooked,   cup  Beef or lamb, 3 oz  Chicken giblets, 3 oz  Chickpeas (garbanzo beans),   cup  Liver " "of beef, lamb, or pork, 3 oz  Oysters (cooked), 3 oz  Sardines (canned), 3 oz  Soybeans (boiled),   cup  Tofu (firm),   cup  Work with your doctor to find out how much of this nutrient you need. Depending on your health, you may need more or less of it in your diet.  Where can you learn more?  Go to https://www.TalentSpring.net/patiented  Enter R005 in the search box to learn more about \"Learning About High-Iron Foods.\"  Current as of: September 20, 2023  Content Version: 14.2 2024 [email or web address].   Care instructions adapted under license by your healthcare professional. If you have questions about a medical condition or this instruction, always ask your healthcare professional. Healthwise, Incorporated disclaims any warranty or liability for your use of this information.    Rh Antibodies Screening During Pregnancy: About This Test  What is it?     The Rh antibodies screening test is a blood test. It checks your blood for Rh antibodies. If you have Rh-negative blood and have been exposed to Rh-positive blood, your immune system may make antibodies to attack the Rh-positive blood. When a pregnant woman has these antibodies, it is called Rh sensitization.  Why is this test done?  The Rh antibodies screening test is done during pregnancy to find out if your baby is at risk for Rh disease. This can happen if you have Rh-negative blood and your baby has Rh-positive blood. If your Rh-negative blood mixes with Rh-positive blood, your immune system will make antibodies to attack the Rh-positive blood.  During pregnancy, these antibodies could attach to the baby's red blood cells. This can cause your baby to have serious health problems. The results of this test will help your doctor know how to best care for you and your baby during your pregnancy.  How do you prepare for the test?  In general, there's nothing you have to do before this test, unless your doctor tells you to.  How is the test done?  A health " "professional uses a needle to take a blood sample, usually from the arm.  What happens after the test?  You will probably be able to go home right away. It depends on the reason for the test.  You can go back to your usual activities right away.  Follow-up care is a key part of your treatment and safety. Be sure to make and go to all appointments, and call your doctor if you are having problems. It's also a good idea to keep a list of the medicines you take. Ask your doctor when you can expect to have your test results.  Where can you learn more?  Go to https://www.Verivue.net/patiented  Enter P722 in the search box to learn more about \"Rh Antibodies Screening During Pregnancy: About This Test.\"  Current as of: July 10, 2023  Content Version: 142024 [employer].   Care instructions adapted under license by your healthcare professional. If you have questions about a medical condition or this instruction, always ask your healthcare professional. Healthwise, Incorporated disclaims any warranty or liability for your use of this information.    Learning About Preventing Rh Disease  What is Rh disease?     Rh disease can be a serious problem in pregnancy. It happens when substances called antibodies in the mother's blood cause red blood cells in her baby's blood to be destroyed. This can occur when the blood types of a mother and her baby do not match.  All blood has an Rh factor. This is what makes a blood type positive or negative. When you are Rh-negative, your baby may be Rh-negative or Rh-positive. If your baby has Rh-positive blood and it mixes with yours, your body will make antibodies. This is called Rh sensitization.  Most of the time, this is not a problem in a first pregnancy. But in future pregnancies, it could cause Rh disease.  A  with Rh disease has mild anemia and may have jaundice. In severe cases, anemia, jaundice, and swelling can be very dangerous or fatal. Some babies need " "to be delivered early. Some need special care in the NICU. A very sick baby will need a blood transfusion before or after birth.  Fortunately, Rh sensitization is usually easy to prevent.  That's why it's important to get your Rh status checked in your first trimester. It doesn't cause any warning signs. A blood test is the only way to know if you are Rh-sensitive or are at risk for it.  How can you prevent Rh disease?  If you are Rh-negative, your doctor gives you an Rh immune globulin shot (such as RhoGAM). It helps prevent your body from making the antibodies that attack your baby's red blood cells.  Timing is important. You need the shot at certain times during your pregnancy. And you need one anytime there is a chance that your baby's blood might mix with yours. That can happen with certain prenatal tests or when you have pregnancy bleeding, such as:  Right after any pregnancy loss, amniocentesis, or CVS testing.  After turning of a breech baby.  Before and maybe after childbirth. Your doctor gives you a shot around week 28. If your  is Rh-positive, you will have another shot.  Follow-up care is a key part of your treatment and safety. Be sure to make and go to all appointments, and call your doctor if you are having problems. It's also a good idea to know your test results and keep a list of the medicines you take.  Where can you learn more?  Go to https://www.Productify.net/patiented  Enter W177 in the search box to learn more about \"Learning About Preventing Rh Disease.\"  Current as of: July 10, 2023  Content Version: 2024 [facility] [employer].   Care instructions adapted under license by your healthcare professional. If you have questions about a medical condition or this instruction, always ask your healthcare professional. Healthwise, Incorporated disclaims any warranty or liability for your use of this information.    Learning About Rh Immunoglobulin Shots  Introduction     An Rh " immunoglobulin shot is given to pregnant women who have Rh-negative blood.  You may have Rh-negative blood, and your baby may have Rh-positive blood. If the two types of blood mix, your body will make antibodies. This is called Rh sensitization. Most of the time, this is not a problem the first time you're pregnant. But it could cause problems in future pregnancies.  This shot keeps your body from making the antibodies. You get the shot around 28 weeks of pregnancy. After the birth, your baby's blood is tested. If the blood is Rh positive, you will get another shot. You may also get the shot if you have vaginal bleeding while you are pregnant or if you have a miscarriage. These shots protect future pregnancies.  Women with Rh negative blood will need this shot each time they get pregnant.  Example  Rh immunoglobulin (HypRho-D, MICRhoGAM, and RhoGAM)  Possible side effects  Rare side effects may include:  Some mild pain where you got the shot.  A slight fever.  An allergic reaction.  You may have other side effects not listed here. Check the information that comes with your medicine.  What to know about taking this medicine  You may need more than one shot. You may need the shot again:  After amniocentesis, fetal blood sampling, or chorionic villus sampling tests.  If you have bleeding in your second or third trimester.  After turning of a breech baby.  After an injury to the belly while you are pregnant.  After a miscarriage or an .  Before or right after treatment for an ectopic or a partial molar pregnancy.  Tell your doctor if you have any allergies or have had a bad response to medicines in the past.  If you get this shot within 3 months of getting a live-virus vaccine, the vaccine may not work. Your doctor will tell you if you need more vaccine.  Check with your doctor or pharmacist before you use any other medicines. This includes over-the-counter medicines. Make sure your doctor knows all of the  "medicines, vitamins, herbs, and supplements you take. Taking some medicines at the same time can cause problems.  Where can you learn more?  Go to https://www.BinWise.net/patiented  Enter V615 in the search box to learn more about \"Learning About Rh Immunoglobulin Shots.\"  Current as of: July 10, 2023  Content Version: 14.2 2024 [address].   Care instructions adapted under license by your healthcare professional. If you have questions about a medical condition or this instruction, always ask your healthcare professional. [employer] disclaims any warranty or liability for your use of this information.    Rubella ([place] Measles): Care Instructions  Overview  Rubella, also called [place] measles or 3-day measles, is a disease caused by a virus. It spreads by coughs, sneezes, and close contact. Rubella usually is mild and does not cause long-term problems. But if you are pregnant and get it, you can give the disease to your unborn baby. This can cause serious birth defects.  While you have rubella, you may get a rash and a mild fever, and the lymph glands in your neck may swell. Older children often have a fever, eye pain, a sore throat, and body aches. You can relieve most symptoms with care at home. Avoid being around others, especially pregnant people, until your rash has been gone for at least 4 days. People who have not had this disease before or have not had the vaccine have the greatest chance of getting the virus.  Follow-up care is a key part of your treatment and safety. Be sure to make and go to all appointments, and call your doctor if you are having problems. It's also a good idea to know your test results and keep a list of the medicines you take.  How can you care for yourself at home?  Drink plenty of fluids. If you have kidney, heart, or liver disease and have to limit fluids, talk with your doctor before you increase the amount of fluids you drink.  Get plenty of rest " "to help your body heal.  Take an over-the-counter pain medicine, such as acetaminophen (Tylenol), ibuprofen (Advil, Motrin), or naproxen (Aleve), to reduce fever and discomfort. Read and follow all instructions on the label. Do not give aspirin to anyone younger than 20. It has been linked to Reye syndrome, a serious illness.  Do not take two or more pain medicines at the same time unless the doctor told you to. Many pain medicines have acetaminophen, which is Tylenol. Too much acetaminophen (Tylenol) can be harmful.  Try not to scratch the rash. Put cold, wet cloths on the rash to reduce itching.  Do not smoke. Smoking can make your symptoms worse. If you need help quitting, talk to your doctor about stop-smoking programs and medicines. These can increase your chances of quitting for good.  Avoid contact with people who have never had rubella and who have not been immunized.  When should you call for help?   Call your doctor now or seek immediate medical care if:    You have a fever with a stiff neck or a severe headache.     You are sensitive to light or feel very sleepy or confused.   Watch closely for changes in your health, and be sure to contact your doctor if:    You do not get better as expected.   Where can you learn more?  Go to https://www.Inkling.net/patiented  Enter B812 in the search box to learn more about \"Rubella ([place] Measles): Care Instructions.\"  Current as of: June 12, 2023  Content Version: 14.2 2024 [facility].   Care instructions adapted under license by your healthcare professional. If you have questions about a medical condition or this instruction, always ask your healthcare professional. Healthwise[employer] Incorporated disclaims any warranty or liability for your use of this information.    Gonorrhea and Chlamydia: About These Tests  What is it?  These tests use a sample of urine or other body fluid to look for the bacteria that cause these sexually transmitted infections " "(STIs). The fluid sample can come from the cervix, vagina, rectum, throat, or eyes.  Why is this test done?  These tests may be done to:  Find out if symptoms are caused by gonorrhea or chlamydia.  Check people who are at high risk of being infected with gonorrhea or chlamydia.  Retest people several months after they have been treated for gonorrhea or chlamydia.  Check for infection in your  if you had a gonorrhea or chlamydia infection at the time of delivery.  How can you prepare for the test?  If you are going to have a urine test, do not urinate for at least 1 hour before the test.  If you think you may have chlamydia or gonorrhea, don't have sexual intercourse until you get your test results. And you may want to have tests for other STIs, such as HIV.  How is the test done?  For a direct sample, a swab is used to collect body fluid from the cervix, vagina, rectum, throat, or eyes. Your doctor may collect the sample. Or you may be given instructions on how to collect your own sample.  For a urine sample, you will collect the urine that comes out when you first start to urinate. Don't wipe the genital area clean before you urinate.  How long does the test take?  The test will take a few minutes.  What happens after the test?  You will be able to go home right away.  You can go back to your usual activities right away.  If you do have an infection, don't have sexual intercourse for 7 days after you start treatment. And your sex partner(s) should also be treated.  Follow-up care is a key part of your treatment and safety. Be sure to make and go to all appointments, and call your doctor if you are having problems. It's also a good idea to keep a list of the medicines you take. Ask your doctor when you can expect to have your test results.  Where can you learn more?  Go to https://www.healthwise.net/patiented  Enter K976 in the search box to learn more about \"Gonorrhea and Chlamydia: About These " "Tests.\"  Current as of: November 27, 2023  Content Version: 14.2 2024 [facility].   Care instructions adapted under license by your healthcare professional. If you have questions about a medical condition or this instruction, always ask your healthcare professional. [employer] disclaims any warranty or liability for your use of this information.    Trichomoniasis: About This Test  What is it?     This test uses a sample of urine or other body fluid to look for the tiny parasite that causes trichomoniasis (also called trich). The fluid sample can come from the vagina, cervix, or urethra. Your doctor may choose to use one or more of many available tests.  Why is it done?  A trich test may be done to:  Find out if symptoms are caused by trich.  Check people who are at high risk for being infected with trich.  Check after treatment to make sure that the infection is gone.  How do you prepare for the test?  If you are going to have a urine test, do not urinate for at least 1 hour before the test.  How is the test done?  For a direct sample, a swab is used to collect body fluid from the cervix, vagina, or urethra. Your doctor may collect the sample. Or you may be given instructions on how to collect your own sample.  For a urine sample, you will collect the urine that comes out when you first start to urinate. Don't wipe the area clean before you urinate.  How long does the test take?  It will take a few minutes to collect a sample.  What happens after the test?  You can go home right away.  You can go back to your usual activities right away.  You may get the test results the same day or several days later. It depends on the test used.  If you do have an infection, don't have sexual intercourse for 7 days after you start treatment. Your sex partner or partners should also be treated.  Follow-up care is a key part of your treatment and safety. Be sure to make and go to all appointments, and " call your doctor if you are having problems. Ask your doctor when you can expect to have your test results.  Current as of: November 27, 2023  Content Version: 14.2 2024 [facility].   Care instructions adapted under license by your healthcare professional. If you have questions about a medical condition or this instruction, always ask your healthcare professional. Healthwise, Incorporated disclaims any warranty or liability for your use of this information.    HIV Testing: Care Instructions  Overview  You can get tested for the human immunodeficiency virus (HIV). Most doctors use a blood test to check for HIV antibodies and antigens in your blood. It may also check for the genetic material (RNA) of HIV. Some tests use saliva to check for HIV antibodies. But these aren't as accurate. For example, they may give a false result if you've just been infected.  What do the results mean?        Normal (negative)   No HIV antibodies, antigens, or RNA were found.  You may need more testing. It can make sure your test results are correct.        Uncertain (indeterminate)   Test results didn't clearly show if you have an HIV infection.  HIV antibodies or antigens may not have formed yet.  Some other type of antibody or antigen may have affected the results.  You will need another test to be sure.        Abnormal (positive)   HIV antibodies, antigens, or RNA were found.  If you haven't had an RNA test yet, one will be done. If it's positive, you have HIV.  If your test result is positive, your doctor will talk to you. You will discuss starting treatment.  Follow-up care is a key part of your treatment and safety. Be sure to make and go to all appointments, and call your doctor if you are having problems. It's also a good idea to know your test results and keep a list of the medicines you take.  Where can you learn more?  Go to https://www.[employer].net/patiented  Enter T792 in the search box to learn more about  "\"HIV Testing: Care Instructions.\"  Current as of: June 12, 2023  Content Version: 14.2 2024 [email or web address].   Care instructions adapted under license by your healthcare professional. If you have questions about a medical condition or this instruction, always ask your healthcare professional. Healthwise, Incorporated disclaims any warranty or liability for your use of this information.    Hepatitis C Virus Tests: About These Tests  What are they?     Hepatitis C virus tests are blood tests that check for substances in the blood that show whether you have hepatitis C now or had it in the past. The tests can also tell you what type of hepatitis C you have and how severe the disease is. This can help your doctor with treatment.  If the tests show that you have long-term hepatitis C, you need to take steps to prevent spreading the disease.  Why are these tests done?  You may need these tests if:  You have symptoms of hepatitis.  You may have been exposed to the virus. You are more likely to have been exposed to the virus if you inject drugs or are exposed to body fluids (such as if you are a health care worker).  You've had other tests that show you have liver problems.  You are 18 to 79 years old.  You have an HIV infection.  The tests also are done to help your doctor decide about your treatment and see how well it works.  How do you prepare for the test?  In general, there's nothing you have to do before this test, unless your doctor tells you to.  How is the test done?  A health professional uses a needle to take a blood sample, usually from the arm.  What happens after these tests?  You will probably be able to go home right away.  You can go back to your usual activities right away.  Follow-up care is a key part of your treatment and safety. Be sure to make and go to all appointments, and call your doctor if you are having problems. It's also a good idea to keep a list of the medicines you take. Ask your " "doctor when you can expect to have your test results.  Where can you learn more?  Go to https://www.Flavorvanil.net/patiented  Enter W551 in the search box to learn more about \"Hepatitis C Virus Tests: About These Tests.\"  Current as of: June 12, 2023  Content Version: 14.2 2024 [facility].   Care instructions adapted under license by your healthcare professional. If you have questions about a medical condition or this instruction, always ask your healthcare professional. Healthwise, Incorporated disclaims any warranty or liability for your use of this information.    Learning About Fetal Ultrasound Results  What is a fetal ultrasound?     Fetal ultrasound is a test that lets your doctor see an image of your baby. Your doctor learns information about your baby from this picture. You may find out, for example, if you are having a boy or a girl. But the main reason you have this test is to get information about your baby's health.  (You may hear your baby called a fetus. This is a common medical term for a baby that's growing in the mother's uterus.)  What kind of information can you learn from this test?  The findings of an ultrasound fall into two categories, normal and abnormal.  Normal  The fetus is the right size for its age.  The placenta is the expected size and does not cover the cervix.  There is enough amniotic fluid in the uterus.  No birth defects can be seen.  Abnormal  The fetus is small or large for its age.  The placenta covers the cervix.  There is too much or too little amniotic fluid in the uterus.  The fetus may have a birth defect.  What does an abnormal result mean?  Abnormal seems to imply that something is wrong with your baby. But what it means is that the test has shown something the doctor wants to take a closer look at.  And that's what happens next. Your doctor will talk to you about what further test or tests you may need.  What do the results mean?  Some of the things your " doctor may see on an abnormal ultrasound include:  Echogenic bowel.  The bowel looks very bright on the screen. This could mean that there's blood in the bowel. Or it could mean that something is blocking the small bowel.  Increased nuchal translucency.  The ultrasound measures the thickness at the back of the baby's neck. An increase in thickness is sometimes an early sign of Down syndrome.  Increased or decreased amniotic fluid.  The doctor will look for a reason for the level of amniotic fluid and will watch the pregnancy closely as it progresses.  Large ventricles.  Ventricles in the brain look larger than they should. Your doctor may take a closer look at the brain.  Renal pyelectasis/hydronephrosis.  The ultrasound measures the fluid around the kidney. If there is more fluid than expected, there is a chance of urinary tract or kidney problems.  Short long bones.  The ultrasound measures certain arm and leg bones. A long bone (humerus or femur) that is shorter than average could be a sign of Down syndrome.  Subchorionic hemorrhage.  An ultrasound can show bleeding under one of the membranes that surrounds the fetus. Some women don't have symptoms of bleeding. The ultrasound can find this problem when women are not bleeding from their vagina. Women who have this condition have a slightly higher chance of miscarriage.  What do you do now?  Take a deep breath, and let it out. Keep in mind that an abnormal finding on an ultrasound, after it's coupled with more information, may:  Turn out to be nothing.  Turn out to be something mild that won't affect the baby.  Turn out to be something more serious. But if this happens, early diagnosis helps you and your doctor plan treatment options sooner rather than later.  Your medical team is there for you. So are your family and friends. Ask questions, and get the help and support you need.  Follow-up care is a key part of your treatment and safety. Be sure to make and go to  "all appointments, and call your doctor if you are having problems. It's also a good idea to know your test results and keep a list of the medicines you take.  Where can you learn more?  Go to https://www.[employer].net/patiented  Enter K451 in the search box to learn more about \"Learning About Fetal Ultrasound Results.\"  Current as of: July 10, 2023  Content Version: 14.2 2024 [email or web address].   Care instructions adapted under license by your healthcare professional. If you have questions about a medical condition or this instruction, always ask your healthcare professional. Healthwise, Incorporated disclaims any warranty or liability for your use of this information.    Learning About Prenatal Visits  Overview     Regular prenatal visits are very important during any pregnancy. These quick office visits may seem simple and routine. But they can help you have a safe and healthy pregnancy. Your doctor is watching for problems that can only be found through regular checkups. The visits also give you and your doctor time to build a good relationship.  After your first visit, you will most likely start on a schedule of monthly visits. In your third trimester, the visits will get more frequent. Based on your health, your age, and if you've had a normal, full-term pregnancy before, your doctor may want to see you more or less often.  At different times in your pregnancy, you will have exams and tests. Some are routine. Others are done only when there is a chance of a problem. Everything healthy you do for your body helps you have a healthy pregnancy. Rest when you need it. Eat well, drink plenty of water, and exercise regularly.  What happens during a prenatal visit?  You will have blood pressure checks, along with urine tests. You also may have blood tests. If you need to go to the bathroom while waiting for the doctor, tell the nurse. You will be given a sample cup so your urine can be tested.  You will be " weighed and have your belly measured.  Your doctor may listen to the fetal heartbeat with a special device.  At about 24 weeks, and possibly earlier in your pregnancy, your doctor will check your blood sugar (glucose tolerance test) for diabetes that can occur during pregnancy. This is gestational diabetes, which can be harmful.  You will have tests to check for infections that could harm your . These include group B streptococcus and hepatitis B.  Your doctor may do ultrasounds to check for problems. This also checks the position of the fetus. An ultrasound uses sound waves to produce a picture of the fetus.  You may get your vaccines updated.  Your doctor may ask you questions to check for signs of anxiety or depression. Tell your doctor if you feel sad, anxious, or hopeless for more than a few days.  You may have other tests at any time during your pregnancy.  Use your visits to discuss with your doctor any concerns you have.  How can you care for yourself at home?  Get plenty of rest.  Try to exercise every day, if your doctor says it is okay. If you have not exercised in the past, start out slowly. For example, you can take short walks each day.  Choose healthy foods, such as fruits, vegetables, whole grains, lean proteins, low-fat dairy, and healthy fats.  Drink plenty of fluids. Cut down on drinks with caffeine, such as coffee, tea, and cola. If you have kidney, heart, or liver disease and have to limit fluids, talk with your doctor before you increase the amount of fluids you drink.  Try to avoid chemical fumes, paint fumes, and poisons.  If you smoke, vape, or use alcohol, marijuana, or other drugs, quit or cut back as much as you can. Talk to your doctor if you need help quitting.  Review all of your medicines, including over-the-counter medicines and supplements, with your doctor. Some of your routine medicines may need to be changed. Do not stop or start taking any medicines without talking to  "your doctor first.  Follow-up care is a key part of your treatment and safety. Be sure to make and go to all appointments, and call your doctor if you are having problems. It's also a good idea to know your test results and keep a list of the medicines you take.  Where can you learn more?  Go to https://www.[employer].net/patiented  Enter J502 in the search box to learn more about \"Learning About Prenatal Visits.\"  Current as of: July 10, 2023  Content Version: 14.2 2024 [employer].   Care instructions adapted under license by your healthcare professional. If you have questions about a medical condition or this instruction, always ask your healthcare professional. Healthwise, Incorporated disclaims any warranty or liability for your use of this information.    Intimate Partner Violence: Care Instructions  Overview     If you want to save this information but don't think it is safe to take it home, see if a trusted friend can keep it for you. Plan ahead. Know who you can call for help, and memorize the phone number.   Be careful online too. Your online activity may be seen by others. Do not use your personal computer or device to read about this topic. Use a safe computer, such as one at work, a friend's home, or a library.    Intimate partner violence--a type of domestic abuse--is different from an argument now and then. It is a pattern of abuse that one person may use to control another person's behavior. It may start with threats and name-calling. Then, it may lead to more serious acts, like pushing and slapping. The abuse also may occur in other areas. For example, the abuser may withhold money or spend a partner's money without their knowledge.  Abuse can cause serious harm. You are more likely to have a long-term health problem from the injuries and stress of living in a violent relationship. People who are sexually abused by their partners have more sexually transmitted infections and unplanned " "pregnancies. Anyone who is abused also faces emotional pain. Anyone can be abused in relationships. In some relationships, both people use abusive behavior.  If you are pregnant, abuse can cause problems such as poor weight gain, infections, and bleeding. Abuse during this time may increase your baby's risk of low birth weight, premature birth, and death.  Follow-up care is a key part of your treatment and safety. Be sure to make and go to all appointments, and call your doctor if you are having problems. It's also a good idea to know your test results and keep a list of the medicines you take.  How can you care for yourself at home?  If you do not have a safe place to stay, discuss this with your doctor before you leave.  Have a plan for where to go, how to leave your home, and where to stay in case of an emergency. Do not tell your partner about your plan. Contact:  The National Domestic Violence Hotline toll-free at 1-796.780.9929. They can help you find resources in your area.  Your local police department, hospital, or clinic for information about shelters and safe homes near you.  Talk to a trusted friend or neighbor, a counselor, or a candi leader. Do not feel that you have to hide what happened.  Teach your children how to call for help in an emergency.  Be alert to warning signs, such as threats, heavy alcohol use, or drug use. This can help you avoid danger.  If you can, make sure that there are no guns or other weapons in your home.  When should you call for help?   Call 911 anytime you think you may need emergency care. For example, call if:    You or someone else has just been abused.     You think you or someone else is in danger of being abused.   Watch closely for changes in your health, and be sure to contact your doctor if you have any problems.  Where can you learn more?  Go to https://www.healthwise.net/patiented  Enter G282 in the search box to learn more about \"Intimate Partner Violence: Care " "Instructions.\"  Current as of: June 24, 2023  Content Version: 14.2 2024 [facility].   Care instructions adapted under license by your healthcare professional. If you have questions about a medical condition or this instruction, always ask your healthcare professional. Healthwise, Incorporated disclaims any warranty or liability for your use of this information.    Intimate Partner Violence Safety Instructions: Care Instructions  Overview     If you want to save this information but don't think it is safe to take it home, see if a trusted friend can keep it for you. Plan ahead. Know who you can call for help, and memorize the phone number.   Be careful online too. Your online activity may be seen by others. Do not use your personal computer or device to read about this topic. Use a safe computer, such as one at work, a friend's home, or a library.    When you are abused by a spouse or partner, you can take actions to protect yourself and your children.  You can increase your safety whether you decide to stay with your spouse or partner or you decide to leave. You may want to make a safety plan and pack a bag ahead of time. This will help you leave quickly when you decide to. Remember, you cannot change your partner's actions, but you can find help for you and your children. No one deserves to be abused.  Follow-up care is a key part of your treatment and safety. Be sure to make and go to all appointments, and call your doctor if you are having problems. It's also a good idea to know your test results and keep a list of the medicines you take.  How can you care for yourself at home?  Make a plan for your safety   If you decide to stay with your abusive spouse or partner, you can do the following to increase your safety:  Decide what works best to keep you safe in an emergency.  Know who you can call to help you in an emergency.  Decide if you will call the police if you get hurt again. If you can, " agree on a signal with your children or neighbor to call the police for you if you need help. You can flash lights or hang something out of a window.  Choose a safe place to go for a short time if you need to leave home. Memorize the address and phone number.  Learn escape routes out of your home in case you need to leave in a hurry. Teach your children different ways to get out of your home quickly if they need to.  If you can, hide or lock up things that can be used as weapons (guns, knives, hammers).  Learn the number of a domestic violence shelter. Talk to the people there about how they can help.  Find out about other community resources that can help you.  Take pictures of bruises or other injuries if you can. You can also take pictures of things your abuser has broken.  Teach your children that violence is never okay. Tell them that they do not deserve to be hurt.  Pack a bag   Prepare a bag with things you will need if you leave suddenly. Leave it with a friend, a relative, or someone else you trust. You could include the following items in the bag:  A set of keys to your home and car.  Emergency phone numbers and addresses.  Money such as cash or checks. You can also ask a friend, a relative, or someone else you trust to hold money for you.  Copies of legal documents such as house and car titles or rent receipts, birth certificates, Social Security card, voter registration, marriage and 's licenses, and your children's health records.  Personal items you would need for a few days, such as clothes, a toothbrush, toothpaste, and any medicines you or your children need.  A favorite toy or book for your child or children.  Diapers and bottles, if you have very young children.  Pictures that show signs of abuse and violence. You may also add pictures of your abuser.  If you leave   If you decide to leave, you can take the following steps:  Go to the emergency room at a hospital if you have been  "hurt.  Think about asking the police to be with you as you leave. They can protect you as you leave your home.  If you decide to leave secretly, remember that activities can be tracked. Your abuser may still have access to your cell phone, email, and credit cards. It may be possible for these to be traced. Always be aware of your surroundings.  If this is an emergency, do not worry about gathering up anything. Just leave--your safety is most important.  If your abuser moves out, change the locks on the doors. If you have a security system, change the access code.  When should you call for help?   Call 911 anytime you think you may need emergency care. For example, call if:    You or someone else has just been abused.     You think you or someone else is in danger of being abused.   Watch closely for changes in your health, and be sure to contact your doctor if you have any problems.  Where can you learn more?  Go to https://www.PingThings.net/patiented  Enter A752 in the search box to learn more about \"Intimate Partner Violence Safety Instructions: Care Instructions.\"  Current as of: June 24, 2023  Content Version: 14.2 2024 [employer].   Care instructions adapted under license by your healthcare professional. If you have questions about a medical condition or this instruction, always ask your healthcare professional. Healthwise, Incorporated disclaims any warranty or liability for your use of this information.    Learning About Intimate Partner Violence  What is intimate partner violence?  Intimate partner violence is a type of domestic abuse. It's threatening, emotionally harmful, or violent behavior in a personal relationship. It can happen between past or current partners or spouses. In some relationships both people abuse each other. One partner may be more abusive. Or the abuse may be equal.  Abuse can affect people of any ethnic group, race, or Jewish. It can affect teens, adults, or the " elderly. And it can happen to people of any sexual orientation, gender, or social status.  Abusers use fear, bullying, and threats to control their partners. They may control what their partners do. They may control where their partners go or who they see. They may act jealous, controlling, or possessive. These early signs of abuse may happen soon after the start of the relationship. Sometimes it can be hard to notice abuse at first. But after the relationship becomes more serious, the abuse may get worse.  If you are being abused in your relationship, it's important to get help. The abuse is not your fault. You don't have to face it alone.  Be careful  It may not be safe to take home domestic abuse information like this handout. Some people ask a trusted friend to keep it for them. It's also important to plan ahead and to memorize the phone number of places you can go for help. If you are concerned about your safety, do not use your computer, smartphone, or tablet to read about domestic abuse.   What are the types of intimate partner violence?  Abuse can happen in different ways. Each type can happen on its own or in combination with others.  Emotional abuse  Emotional abuse is a pattern of threats, insults, or controlling behavior. It includes verbal abuse. It goes beyond healthy disagreements in a relationship. It's a sign of an unhealthy relationship.  Do you feel threatened, intimidated, or controlled?  Does your partner:  Threaten your children, other family members, or pets?  Use jokes meant to embarrass or shame you?  Call you names?  Tell you that you are a bad parent?  Threaten to take away your children?  Threaten to have you or your family members deported?  Control your access to money or other basic needs?  Control what you do, who you see or talk to, or where you go?  Another form of emotional abuse is denying that it is happening. Or the abuser may act like the abuse is no big deal or is your  fault.  Sexual abuse  With sexual abuse, abusers may try to convince or force you to have sex. They may force you into sex acts you're not comfortable with. Or they may sexually assault you. Sexual abuse can happen even if you are in a committed relationship.  Physical abuse  Physical abuse means that a partner hits, kicks, or does something else to physically hurt you. Physical abuse that starts with a slap might lead to kicking, shoving, and choking over time. The abuser may also threaten to hurt or kill you.  Stalking  Stalking means that an abuser gives you attention that you do not want and that causes you fear. Examples of stalking include:  Following you.  Showing up at places where the abuser isn't invited, such as at your work or school.  Constantly calling or texting you.  What problems can  to?  Intimate partner violence can be very dangerous. It can cause serious, repeated injury. It can even lead to death.  All forms of abuse can cause long-term health problems from the stress of a violent relationship. Verbal abuse can lead to sexual and physical abuse.  Abuse causes:  Emotional pain.  Depression.  Anxiety.  Post-traumatic stress.  Sexual abuse can lead to sexually transmitted infections (such as HIV/AIDS) and unplanned pregnancy.  Pregnancy can be a very dangerous time for people in abusive relationships. Abuse can cause or increase the risk of problems during pregnancy. These include low weight gain, anemia, infections, and bleeding. Abuse may also increase your baby's risk of low birth weight, premature birth, and death.  It can be hard for some victims of abuse to ask for help or to leave their relationship. You may feel scared, stuck, or not sure what steps to take. But it's important not to ignore abuse. Talking to someone you trust could be the first step to ending the abuse and taking care of your own health and happiness again. There are resources available that can help keep you  "safe.  Where can you get help?  Talk to a trusted friend. Find a local advocacy group, or talk to your doctor about the abuse.  Contact the National Domestic Violence Hotline at 6-896-956-VLXZ (1-254.624.1724) for more safety tips. They can guide you to groups in your area that can help. Or go to the National Coalition Against Domestic Violence website at www.theRightSignature.org to learn more.  Domestic violence groups or a counselor in your area can help you make a safety plan for yourself and your children.  When to call for help  Call 911 anytime you think you may need emergency care. For example, call if:  You think that you or someone you know is in danger of being abused.  You have been hurt and can't have someone safely take you to emergency care.  You have just been abused.  A family member has just been abused.  Where can you learn more?  Go to https://www.PressConnect.net/patiented  Enter S665 in the search box to learn more about \"Learning About Intimate Partner Violence.\"  Current as of: June 24, 2023  Content Version: 14.2 2024 [employer].   Care instructions adapted under license by your healthcare professional. If you have questions about a medical condition or this instruction, always ask your healthcare professional. Healthwise, Incorporated disclaims any warranty or liability for your use of this information.    Vaginal Bleeding During Pregnancy: Care Instructions  Overview     It's common to have some vaginal spotting when you are pregnant. In some cases, the bleeding isn't serious. And there aren't any more problems with the pregnancy.  But sometimes bleeding is a sign of a more serious problem. This is more common if the bleeding is heavy or painful. Examples of more serious problems include miscarriage, an ectopic pregnancy, and a problem with the placenta.  You may have to see your doctor again to be sure everything is okay. You may also need more tests to find the cause of the " bleeding.  Home treatment may be all you need. But it depends on what is causing the bleeding. Be sure to tell your doctor if you have any new symptoms or if your symptoms get worse.  The doctor has checked you carefully, but problems can develop later. If you notice any problems or new symptoms, get medical treatment right away.  Follow-up care is a key part of your treatment and safety. Be sure to make and go to all appointments, and call your doctor if you are having problems. It's also a good idea to know your test results and keep a list of the medicines you take.  How can you care for yourself at home?  If your doctor prescribed medicines, take them exactly as directed. Call your doctor if you think you are having a problem with your medicine.  Do not have vaginal sex until your doctor says it's okay.  Do not put anything in your vagina until your doctor says it's okay.  Ask your doctor about other activities you can or can't do.  Get a lot of rest. Being pregnant can make you tired.  Do not use nonsteroidal anti-inflammatory drugs (NSAIDs), such as ibuprofen (Advil, Motrin), naproxen (Aleve), or aspirin, unless your doctor says it is okay.  When should you call for help?   Call 911 anytime you think you may need emergency care. For example, call if:    You passed out (lost consciousness).     You have severe vaginal bleeding. This means you are soaking through a pad each hour for 2 or more hours.     You have sudden, severe pain in your belly or pelvis.   Call your doctor now or seek immediate medical care if:    You have new or worse vaginal bleeding.     You are dizzy or lightheaded, or you feel like you may faint.     You have pain in your belly, pelvis, or lower back.     You think that you are in labor.     You have a sudden release of fluid from your vagina.     You've been having regular contractions for an hour. This means that you've had at least 8 contractions within 1 hour or at least 4  contractions within 20 minutes, even after you change your position and drink fluids.     You notice that your baby has stopped moving or is moving much less than normal.   Watch closely for changes in your health, and be sure to contact your doctor if you have any problems.  Current as of: July 10, 2023  Content Version: 14.2    2024 [employer].   Care instructions adapted under license by your healthcare professional. If you have questions about a medical condition or this instruction, always ask your healthcare professional. Healthwise, Incorporated disclaims any warranty or liability for your use of this information.  Weeks 10 to 14 of Your Pregnancy: Care Instructions  It's now possible to hear the fetus's heartbeat with a special ultrasound device. And the fetus's organs are developing.    Decide about tests to check for birth defects. Think about your age, your chance of passing on a family disease, your need to know about any problems, and what you might do after you have the test results.   It's okay to exercise. Try activities such as walking or swimming. Check with your doctor before starting a new program.     You may feel more tired than usual.  Taking naps during the day may help.     You may feel emotional.  It might help to talk to someone.     You may have headaches.  Try lying down and putting a cool cloth over your forehead.     You can use acetaminophen (Tylenol) for pain relief.  Don't take any anti-inflammatory medicines (such as Advil, Motrin, Aleve), unless your doctor says it's okay.     You may feel a fullness or aching in your lower belly.  This can feel like the kind of cramps you might get before a period. A back rub may help.     You may need to urinate more.  Your growing uterus and changing hormones can affect your bladder.     You may feel sick to your stomach (morning sickness).  Try avoiding food and smells that make you feel sick.     Your breasts may feel different.   "They may feel tender or get bigger. Your nipples may get darker. Try a bra that gives you good support.     Avoid alcohol, tobacco, and drugs (including marijuana).  If you need help quitting, talk to your doctor.     Take a daily prenatal vitamin.  Choose one with folic acid.   Follow-up care is a key part of your treatment and safety. Be sure to make and go to all appointments, and call your doctor if you are having problems. It's also a good idea to know your test results and keep a list of the medicines you take.  Where can you learn more?  Go to https://www.[employer] [place].net/patiented  Enter E090 in the search box to learn more about \"Weeks 10 to 14 of Your Pregnancy: Care Instructions.\"  Current as of: July 10, 2023  Content Version: 14.2 2024 [employer].   Care instructions adapted under license by your healthcare professional. If you have questions about a medical condition or this instruction, always ask your healthcare professional. Healthwise, [employer] disclaims any warranty or liability for your use of this information.      Nutrition During Pregnancy: Care Instructions  Overview     Healthy eating when you are pregnant is important for you and your baby. It can help you feel well and have a successful pregnancy and delivery. During pregnancy your nutrition needs increase. Even if you have excellent eating habits, your doctor may recommend a multivitamin to make sure you get enough iron and folic acid.  You may wonder how much weight you should gain. In general, if you were at a healthy weight before you became pregnant, then you should gain between 25 and 35 pounds. If you were overweight before pregnancy, then you'll likely be advised to gain 15 to 25 pounds. If you were underweight before pregnancy, then you'll probably be advised to gain 28 to 40 pounds. Your doctor will work with you to set a weight goal that is right for you. Gaining a healthy amount of weight helps you have a " healthy baby.  Follow-up care is a key part of your treatment and safety. Be sure to make and go to all appointments, and call your doctor if you are having problems. It's also a good idea to know your test results and keep a list of the medicines you take.  How can you care for yourself at home?  Eat plenty of fruits and vegetables. Include a variety of orange, yellow, and leafy dark-green vegetables every day.  Choose whole-grain bread, cereal, and pasta. Good choices include whole wheat bread, whole wheat pasta, brown rice, and oatmeal.  Get 4 or more servings of milk and milk products each day. Good choices include nonfat or low-fat milk, yogurt, and cheese. If you cannot eat milk products, you can get calcium from calcium-fortified products such as orange juice, soy milk, and tofu. Other non-milk sources of calcium include leafy green vegetables, such as broccoli, kale, mustard greens, turnip greens, bok beverley, and brussels sprouts.  If you eat meat, pick lower-fat types. Good choices include lean cuts of meat and chicken or turkey without the skin.  Avoid fish that are high in mercury. These include shark, swordfish, willard mackerel, marlin, orange roughy, and bigeye tuna, as well as tilefish from the [facility].  It's okay to eat up to 8 to 12 ounces a week of fish that are low in mercury or up to 4 ounces a week of fish that have medium levels of mercury. Some fish that are low in mercury are salmon, shrimp, canned light tuna, cod, and tilapia. Some fish that have medium levels of mercury are halibut and white albacore tuna.  For more advice about eating fish, you can visit the U.S. Food and Drug Administration (FDA) or U.S. Environmental Protection Agency (EPA) website.  Heat lunch meats (such as turkey, ham, or bologna) to 165 F before you eat them. This reduces your risk of getting sick from a kind of bacteria that can be found in lunch meats.  Do not eat unpasteurized soft cheeses, such as brie, feta,  "fresh mozzarella, and blue cheese. They have a bacteria that could harm your baby.  Limit caffeine to about 200 to 300 mg per day. On average, a cup of brewed coffee has around 80 to 100 mg of caffeine.  Do not drink any alcohol. No amount of alcohol has been found to be safe during pregnancy.  Do not diet or try to lose weight. For example, do not follow a low-carbohydrate diet. If you are overweight at the start of your pregnancy, your doctor will work with you to manage your weight gain.  Tell your doctor about all vitamins and supplements you take.  When should you call for help?  Watch closely for changes in your health, and be sure to contact your doctor if you have any problems.  Where can you learn more?  Go to https://www.[employer].net/patiented  Enter Y785 in the search box to learn more about \"Nutrition During Pregnancy: Care Instructions.\"  Current as of: September 20, 2023  Content Version: 14.2 2024 [employer].   Care instructions adapted under license by your healthcare professional. If you have questions about a medical condition or this instruction, always ask your healthcare professional. Healthwise, Incorporated disclaims any warranty or liability for your use of this information.    Exercise During Pregnancy: Care Instructions  Overview     Exercise is good for you during a healthy pregnancy. It can relieve back pain, swelling, and other discomforts. It also prepares your muscles for childbirth. And exercise can improve your energy level and help you sleep better.  If your doctor advises it, get more exercise. For example, walking is a good choice. Bit by bit, increase the amount you walk every day. Try for at least 30 minutes on most days of the week. You could also try a prenatal exercise class. But if you do not already exercise, be sure to talk with your doctor before you start a new exercise program. Doctors do not recommend contact sports during pregnancy.  Follow-up care is " "a key part of your treatment and safety. Be sure to make and go to all appointments, and call your doctor if you are having problems. It's also a good idea to know your test results and keep a list of the medicines you take.  How can you care for yourself at home?  Talk with your doctor about the right kind of exercise for each stage of pregnancy.  Listen to your body to know if your exercise is at a safe level.  Do not become overheated while you exercise. High body temperature can be harmful. Avoid activities that can make your body too hot.  If you feel tired, take it easy. You might walk instead of run.  If you are used to strenuous exercise, ask your doctor how to know when it's time to slow down.  If you exercised before getting pregnant, you should be able to keep up your routine early in your pregnancy. Later in your pregnancy, you may want to switch to more gentle activities.  Drink plenty of fluids before, during, and after exercise.  Avoid contact sports, such as soccer and basketball. Also avoid risky activities. These include scuba diving, horseback riding, and exercising at a high altitude (above 6,000 feet). If you live in a place with a high altitude, talk to your doctor about how you can exercise safely.  Do not get overtired while you exercise. You should be able to talk while you work out.  After your fourth month of pregnancy, avoid exercises that require you to lie flat on your back on a hard surface. These include sit-ups and some yoga poses.  Get plenty of rest. You may be very tired while you are pregnant.  Where can you learn more?  Go to https://www.[employer].net/patiented  Enter S801 in the search box to learn more about \"Exercise During Pregnancy: Care Instructions.\"  Current as of: July 10, 2023  Content Version: 14.2 2024 [employer].   Care instructions adapted under license by your healthcare professional. If you have questions about a medical condition or this " instruction, always ask your healthcare professional. Healthwise, [facility] disclaims any warranty or liability for your use of this information.    Learning About Pregnancy When You Are Underweight or Overweight  How does your weight affect your pregnancy?    The basics of prenatal care are the same for everyone, regardless of size. You'll get what you need to have a healthy baby.  But if you are not at a weight that is healthy for you, it can make a difference in a few things. Being underweight or overweight can increase the chances of some problems during pregnancy. So your doctor or midwife will pay close attention to your health and your baby's health. You may have some extra doctor or midwife visits and tests. And you may have some tests earlier in your pregnancy.  Work with your doctor or midwife to get the care you need. Go to all your doctor or midwife visits, and follow their advice about what to do and what to avoid during pregnancy.  How much weight gain is healthy during pregnancy?  There's no fixed number of pounds that you should be aiming for. Instead, there's a range of weight gain that's good for you and your baby. Based on your weight before pregnancy, experts say it's generally best to gain about:  to if you're underweight.  to if you're at a healthy weight.  to if you're overweight.  to if you're very overweight (obese). In some cases, a doctor may recommend that you don't gain any weight.  If you have questions about weight gain during pregnancy, talk with your doctor about what's right for you. Gaining a healthy amount of weight helps you have a healthy pregnancy.  How much extra food do you need to eat?  How much food you need to eat during pregnancy depends on:  Your height.  How much you weigh when you get pregnant.  How active you are.  If you're carrying more than one fetus (multiple pregnancy).  In the first trimester, you'll probably need the same amount of calories as you did before  "you were pregnant. In general, in your second trimester, you need to eat about 340 extra calories a day. In your third trimester, you need to eat about 450 extra calories a day.  What can you do to have a healthy pregnancy?  The best things you can do for you and your baby are to eat healthy foods, get regular exercise, avoid alcohol and smoking, and go to your doctor or midwife visits.  Eat a variety of foods from all the food groups. Make sure to get enough calcium and folic acid. Ask your doctor or midwife how much folic acid you should be taking.  You may want to work with a dietitian to help you plan healthy meals to get the right amount of calories for you.  Talk to your doctor or midwife about how you can exercise safely. If you didn't exercise much before you got pregnant, talk to your doctor or midwife about how you can slowly get more active. They may want to set up an exercise program with you.  Where can you learn more?  Go to https://www.Geno.net/patiented  Enter B644 in the search box to learn more about \"Learning About Pregnancy When You Are Underweight or Overweight.\"  Current as of: July 10, 2023  Content Version: 14.2 2024 [employer].   Care instructions adapted under license by your healthcare professional. If you have questions about a medical condition or this instruction, always ask your healthcare professional. [employer] disclaims any warranty or liability for your use of this information.    You have been provided the CDC Warning Signs in Pregnancy document.    Additional copies can be found here: www.PlaceVine.com/329710.pdf  "

## 2024-11-06 ENCOUNTER — ANCILLARY PROCEDURE (OUTPATIENT)
Dept: ULTRASOUND IMAGING | Facility: OTHER | Age: 29
End: 2024-11-06
Attending: OBSTETRICS & GYNECOLOGY
Payer: COMMERCIAL

## 2024-11-06 DIAGNOSIS — O16.1 ELEVATED BLOOD PRESSURE AFFECTING PREGNANCY IN FIRST TRIMESTER, ANTEPARTUM: ICD-10-CM

## 2024-11-06 PROCEDURE — 76801 OB US < 14 WKS SINGLE FETUS: CPT | Mod: TC | Performed by: RADIOLOGY

## 2024-11-07 NOTE — PATIENT INSTRUCTIONS
If you wish to schedule another appointment, please call our office at [phone]. You can also make appointments through [employer]  Return to clinic:     Every 4 weeks till 28 weeks, then every 2 weeks till 36 weeks, then weekly till delivery    If anything comes up between your visits or you have concerns, please don't hesitate to contact us.                                                  If you have labs or imaging done, the results will automatically release in [employer] without an interpretation.  Your health care professional will review those results and send an interpretation with recommendations as soon as possible, but this may be 1-3 business days.    If you have any questions regarding your visit, please contact your care team.     [facility]: [phone]  Women s Health CLINIC HOURS TELEPHONE NUMBER   Chanelle NevarezDOVince    Rosemarie -Surgery Scheduler  Yas -     SAMANTHA Schroeder, SAMANTHA Cage RN   [place]    Monday 8:30 am-5:00 pm  Wednesday 8:30 am-5:00 pm  Friday 8:30 am-5:00 pm    Typical Surgery day: Tuesday [facility]  [address]  [place] [address]  Phone:  [phone]  Fax: [phone]   Appointment [phone]    Imaging Scheduling-All Locations [phone]    [facility] and Delivery  [address] [place] [address]   [place] [address] [phone]   **Surgeries** Our Surgery Schedulers will contact you to schedule. If you do not receive a call within 3 business days, please call [phone].  Urgent Care locations:  [facility] Monday-Friday   10 am - 8 pm  Saturday and    9 am - 5 pm [phone]   If you need a medication refill, please contact your pharmacy. Please allow 3 business days for your refill to be completed.  As always, Thank you for trusting us with your healthcare needs!  see additional instructions from your care team below

## 2024-11-10 LAB
ABO/RH(D): NORMAL
ANTIBODY SCREEN: NEGATIVE
SPECIMEN EXPIRATION DATE: NORMAL

## 2024-11-11 ENCOUNTER — PRENATAL OFFICE VISIT (OUTPATIENT)
Dept: OBGYN | Facility: CLINIC | Age: 29
End: 2024-11-11
Payer: COMMERCIAL

## 2024-11-11 ENCOUNTER — MYC MEDICAL ADVICE (OUTPATIENT)
Dept: NEPHROLOGY | Facility: CLINIC | Age: 29
End: 2024-11-11

## 2024-11-11 VITALS
OXYGEN SATURATION: 100 % | DIASTOLIC BLOOD PRESSURE: 69 MMHG | WEIGHT: 127 LBS | BODY MASS INDEX: 19.16 KG/M2 | HEART RATE: 98 BPM | SYSTOLIC BLOOD PRESSURE: 126 MMHG

## 2024-11-11 DIAGNOSIS — Z34.81 ENCOUNTER FOR SUPERVISION OF OTHER NORMAL PREGNANCY IN FIRST TRIMESTER: Primary | ICD-10-CM

## 2024-11-11 DIAGNOSIS — O16.1 ELEVATED BLOOD PRESSURE AFFECTING PREGNANCY IN FIRST TRIMESTER, ANTEPARTUM: ICD-10-CM

## 2024-11-11 LAB
ALBUMIN UR-MCNC: NEGATIVE MG/DL
APPEARANCE UR: CLEAR
BILIRUB UR QL STRIP: NEGATIVE
COLOR UR AUTO: NORMAL
ERYTHROCYTE [DISTWIDTH] IN BLOOD BY AUTOMATED COUNT: 11.9 % (ref 10–15)
EST. AVERAGE GLUCOSE BLD GHB EST-MCNC: 103 MG/DL
GLUCOSE UR STRIP-MCNC: NEGATIVE MG/DL
HBA1C MFR BLD: 5.2 % (ref 0–5.6)
HBV SURFACE AG SERPL QL IA: NONREACTIVE
HCT VFR BLD AUTO: 36.8 % (ref 35–47)
HCV AB SERPL QL IA: NONREACTIVE
HGB BLD-MCNC: 12.8 G/DL (ref 11.7–15.7)
HGB UR QL STRIP: NEGATIVE
HIV 1+2 AB+HIV1 P24 AG SERPL QL IA: NONREACTIVE
KETONES UR STRIP-MCNC: NEGATIVE MG/DL
LEUKOCYTE ESTERASE UR QL STRIP: NEGATIVE
MCH RBC QN AUTO: 29.6 PG (ref 26.5–33)
MCHC RBC AUTO-ENTMCNC: 34.8 G/DL (ref 31.5–36.5)
MCV RBC AUTO: 85 FL (ref 78–100)
NITRATE UR QL: NEGATIVE
PH UR STRIP: 5.5 [PH] (ref 5–7)
PLATELET # BLD AUTO: 213 10E3/UL (ref 150–450)
RBC # BLD AUTO: 4.33 10E6/UL (ref 3.8–5.2)
RUBV IGG SERPL QL IA: 1.76 INDEX
RUBV IGG SERPL QL IA: POSITIVE
SP GR UR STRIP: 1.02 (ref 1–1.03)
T PALLIDUM AB SER QL: NONREACTIVE
UROBILINOGEN UR STRIP-MCNC: NORMAL MG/DL
WBC # BLD AUTO: 12.2 10E3/UL (ref 4–11)

## 2024-11-11 PROCEDURE — 86900 BLOOD TYPING SEROLOGIC ABO: CPT | Performed by: OBSTETRICS & GYNECOLOGY

## 2024-11-11 PROCEDURE — 87491 CHLMYD TRACH DNA AMP PROBE: CPT | Performed by: OBSTETRICS & GYNECOLOGY

## 2024-11-11 PROCEDURE — 83036 HEMOGLOBIN GLYCOSYLATED A1C: CPT | Performed by: OBSTETRICS & GYNECOLOGY

## 2024-11-11 PROCEDURE — 87591 N.GONORRHOEAE DNA AMP PROB: CPT | Performed by: OBSTETRICS & GYNECOLOGY

## 2024-11-11 PROCEDURE — 86780 TREPONEMA PALLIDUM: CPT | Performed by: OBSTETRICS & GYNECOLOGY

## 2024-11-11 PROCEDURE — 86762 RUBELLA ANTIBODY: CPT | Performed by: OBSTETRICS & GYNECOLOGY

## 2024-11-11 PROCEDURE — 87389 HIV-1 AG W/HIV-1&-2 AB AG IA: CPT | Performed by: OBSTETRICS & GYNECOLOGY

## 2024-11-11 PROCEDURE — 86803 HEPATITIS C AB TEST: CPT | Performed by: OBSTETRICS & GYNECOLOGY

## 2024-11-11 PROCEDURE — 81003 URINALYSIS AUTO W/O SCOPE: CPT | Performed by: OBSTETRICS & GYNECOLOGY

## 2024-11-11 PROCEDURE — 99203 OFFICE O/P NEW LOW 30 MIN: CPT | Performed by: OBSTETRICS & GYNECOLOGY

## 2024-11-11 PROCEDURE — 87086 URINE CULTURE/COLONY COUNT: CPT | Performed by: OBSTETRICS & GYNECOLOGY

## 2024-11-11 PROCEDURE — 36415 COLL VENOUS BLD VENIPUNCTURE: CPT | Performed by: OBSTETRICS & GYNECOLOGY

## 2024-11-11 PROCEDURE — 86901 BLOOD TYPING SEROLOGIC RH(D): CPT | Performed by: OBSTETRICS & GYNECOLOGY

## 2024-11-11 PROCEDURE — 86850 RBC ANTIBODY SCREEN: CPT | Performed by: OBSTETRICS & GYNECOLOGY

## 2024-11-11 PROCEDURE — 85027 COMPLETE CBC AUTOMATED: CPT | Performed by: OBSTETRICS & GYNECOLOGY

## 2024-11-11 PROCEDURE — 87340 HEPATITIS B SURFACE AG IA: CPT | Performed by: OBSTETRICS & GYNECOLOGY

## 2024-11-11 PROCEDURE — 99459 PELVIC EXAMINATION: CPT | Performed by: OBSTETRICS & GYNECOLOGY

## 2024-11-11 NOTE — CONFIDENTIAL NOTE
Charisma is a 29 year old female, , EDC 2025, who is here today for her first OB visit at 10 5/7 weeks gestation. She has had a positive home pregnancy test.  She states that she has a hypoplastic kidney with stage 1 CKD and chronic hypertension so has an appt with her nephrologist next month.  She is currently taking Labetalol 100 mg BID po along with a PNV daily po.  Her social hx is negative for nicotine, etoh, or illicit drug abuse.     ROS: Ten point review of systems was reviewed and negative except the above.    Gyn Hx:      Past Medical History:   Diagnosis Date    CKD (chronic kidney disease) stage 1, GFR 90 ml/min or greater     HTN (hypertension)      Past Surgical History:   Procedure Laterality Date    WISDOM TOOTH EXTRACTION       Patient Active Problem List   Diagnosis    Proteinuria    Hypoplastic kidney    HTN (hypertension)    CKD (chronic kidney disease) stage 2, GFR 60-89 ml/min    CKD (chronic kidney disease) stage 1, GFR 90 ml/min or greater     ALL/Meds: Her medication and allergy histories were reviewed and are documented in their appropriate chart areas.    SH: Reviewed and documented in the appropriate area of the chart.    FH: Her family history was reviewed and documented in its appropriate chart area.    PE: /69   Pulse 98   Wt 57.6 kg (127 lb)   LMP 2024   SpO2 100%   Breastfeeding No   BMI 19.16 kg/m    Body mass index is 19.16 kg/m .    Urine HCG was +  Hrt - RRR without murmur  Lungs - CTAB  Neck - supple without palpable mass  Breasts - not undressed  Abd - soft, nontender, fhts per doppler were 154 bpm  Pelvic - normal external female genitalia, normal vaginal mucosa, closed cervix without motion tenderness, gravid uterus with size consistent with dates, no adnexal mass or tenderness  Ext - negative    A/P:   - I discussed with her nutrition and medication concerns related to pregnancy.  We discussed folic acid supplementation.  We reviewed prenatal  care.  She is given the opportunity to ask questions and have them answered.  She is to continue taking a PNV daily po and her Labetalol 100 mg BID po.  Follow up with her Nephrologist next month.  She prefers a urine collection for her GC and chlamydia tests.    30 minutes were spent face to face with the patient today discussing her history, diagnosis, and follow-up plan as noted above.  Over 50% of the visit was spent in counseling and coordination of care.    Total Visit Time: 30 minutes.    Orders Placed This Encounter   Procedures    Neisseria gonorrhoeae PCR    Chlamydia trachomatis PCR     Chanelle Nevarez DO  FACOG, FACS

## 2024-11-12 LAB
BACTERIA UR CULT: NORMAL
C TRACH DNA SPEC QL NAA+PROBE: NEGATIVE
N GONORRHOEA DNA SPEC QL NAA+PROBE: NEGATIVE

## 2024-12-02 ENCOUNTER — MYC MEDICAL ADVICE (OUTPATIENT)
Dept: NEPHROLOGY | Facility: CLINIC | Age: 29
End: 2024-12-02
Payer: COMMERCIAL

## 2024-12-02 NOTE — TELEPHONE ENCOUNTER
markie Crawford unit(s) ok if we bring her at 5?   Thanks    S Plasty Text: Given the location and shape of the defect, and the orientation of relaxed skin tension lines, an S-plasty was deemed most appropriate for repair.  Using a sterile surgical marker, the appropriate outline of the S-plasty was drawn, incorporating the defect and placing the expected incisions within the relaxed skin tension lines where possible.  The area thus outlined was incised deep to adipose tissue with a #15 scalpel blade.  The skin margins were undermined to an appropriate distance in all directions utilizing iris scissors. The skin flaps were advanced over the defect.  The opposing margins were then approximated with interrupted buried subcutaneous sutures.

## 2024-12-23 ENCOUNTER — LAB (OUTPATIENT)
Dept: LAB | Facility: CLINIC | Age: 29
End: 2024-12-23
Payer: COMMERCIAL

## 2024-12-23 DIAGNOSIS — R80.9 PROTEINURIA: ICD-10-CM

## 2024-12-23 DIAGNOSIS — N18.2 CKD (CHRONIC KIDNEY DISEASE) STAGE 2, GFR 60-89 ML/MIN: ICD-10-CM

## 2024-12-23 LAB
ALBUMIN MFR UR ELPH: 8.4 MG/DL
ALBUMIN SERPL BCG-MCNC: 3.9 G/DL (ref 3.5–5.2)
ALBUMIN UR-MCNC: NEGATIVE MG/DL
ANION GAP SERPL CALCULATED.3IONS-SCNC: 10 MMOL/L (ref 7–15)
APPEARANCE UR: CLEAR
BACTERIA #/AREA URNS HPF: ABNORMAL /HPF
BILIRUB UR QL STRIP: NEGATIVE
BUN SERPL-MCNC: 15.3 MG/DL (ref 6–20)
CALCIUM SERPL-MCNC: 8.9 MG/DL (ref 8.8–10.4)
CHLORIDE SERPL-SCNC: 105 MMOL/L (ref 98–107)
COLOR UR AUTO: NORMAL
CREAT SERPL-MCNC: 0.86 MG/DL (ref 0.51–0.95)
CREAT UR-MCNC: 139 MG/DL
EGFRCR SERPLBLD CKD-EPI 2021: >90 ML/MIN/1.73M2
ERYTHROCYTE [DISTWIDTH] IN BLOOD BY AUTOMATED COUNT: 12.7 % (ref 10–15)
GLUCOSE SERPL-MCNC: 104 MG/DL (ref 70–99)
GLUCOSE UR STRIP-MCNC: NEGATIVE MG/DL
HCO3 SERPL-SCNC: 22 MMOL/L (ref 22–29)
HCT VFR BLD AUTO: 37.4 % (ref 35–47)
HGB BLD-MCNC: 12.6 G/DL (ref 11.7–15.7)
HGB UR QL STRIP: NEGATIVE
KETONES UR STRIP-MCNC: NEGATIVE MG/DL
LEUKOCYTE ESTERASE UR QL STRIP: NEGATIVE
MCH RBC QN AUTO: 30 PG (ref 26.5–33)
MCHC RBC AUTO-ENTMCNC: 33.7 G/DL (ref 31.5–36.5)
MCV RBC AUTO: 89 FL (ref 78–100)
NITRATE UR QL: NEGATIVE
PH UR STRIP: 6.5 [PH] (ref 5–7)
PHOSPHATE SERPL-MCNC: 2.8 MG/DL (ref 2.5–4.5)
PLATELET # BLD AUTO: 223 10E3/UL (ref 150–450)
POTASSIUM SERPL-SCNC: 3.6 MMOL/L (ref 3.4–5.3)
PROT/CREAT 24H UR: 0.06 MG/MG CR (ref 0–0.2)
PTH-INTACT SERPL-MCNC: 18 PG/ML (ref 15–65)
RBC # BLD AUTO: 4.2 10E6/UL (ref 3.8–5.2)
RBC #/AREA URNS AUTO: ABNORMAL /HPF
SKIP: NORMAL
SODIUM SERPL-SCNC: 137 MMOL/L (ref 135–145)
SP GR UR STRIP: 1.02 (ref 1–1.03)
SQUAMOUS #/AREA URNS AUTO: ABNORMAL /LPF
UROBILINOGEN UR STRIP-MCNC: NORMAL MG/DL
WBC # BLD AUTO: 14.8 10E3/UL (ref 4–11)
WBC #/AREA URNS AUTO: ABNORMAL /HPF

## 2024-12-23 PROCEDURE — 84156 ASSAY OF PROTEIN URINE: CPT

## 2024-12-23 PROCEDURE — 80069 RENAL FUNCTION PANEL: CPT

## 2024-12-23 PROCEDURE — 83970 ASSAY OF PARATHORMONE: CPT

## 2024-12-23 PROCEDURE — 85027 COMPLETE CBC AUTOMATED: CPT

## 2024-12-23 PROCEDURE — 81001 URINALYSIS AUTO W/SCOPE: CPT

## 2024-12-23 PROCEDURE — 36415 COLL VENOUS BLD VENIPUNCTURE: CPT

## 2024-12-26 ENCOUNTER — VIRTUAL VISIT (OUTPATIENT)
Dept: NEPHROLOGY | Facility: CLINIC | Age: 29
End: 2024-12-26
Attending: INTERNAL MEDICINE
Payer: COMMERCIAL

## 2024-12-26 DIAGNOSIS — Z3A.17 17 WEEKS GESTATION OF PREGNANCY: ICD-10-CM

## 2024-12-26 RX ORDER — CALCIUM CARBONATE 500(1250)
1 TABLET ORAL 2 TIMES DAILY
Qty: 180 TABLET | Refills: 3 | Status: SHIPPED | OUTPATIENT
Start: 2024-12-26

## 2024-12-26 RX ORDER — ASPIRIN 81 MG/1
81 TABLET ORAL DAILY
Qty: 90 TABLET | Refills: 3 | Status: SHIPPED | OUTPATIENT
Start: 2024-12-26

## 2024-12-26 NOTE — PROGRESS NOTES
"Virtual Visit Details    Type of service:  Video Visit     Originating Location (pt. Location): Home    Distant Location (provider location):  On-site  Platform used for Video Visit: Payam      CC:  -CKD stage I A2  -HTN secondary to CKD  -Probable Preeclampsia     HPI:  Charisma Valencia is a very pleasant 29 year old female  who presents for Follow-up on proteinuric CKD. She is now . She is now pregnant with her second pregnancy. She delivered her daughter \"Shields\" on 23.    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 She has been previously followed by Dr. Lorenzo Houston in the in the [facility].  She has a history of long-standing asymptomatic non-nephrotic range isolated proteinuria and  hypertension. Her renal ultrasound on May, 2016 revealed a right kidney of 6.3 cm (with little growth by comparison) and left kidney 11.6 cm.     She denies any personal or family history of kidney stones or CKD/dialysis/kidney transplant. Her prenatal history is unremarkable as reported. No frequent urinary tract infections during her childhood.    In 2022, her lisinopril was stopped in anticipation of Pregnancy and she was started on nifedipine. Unfortunately, nifedipine caused her headache so she elected to stop it. During her visit in , her BP was elevated and she was slightly tachycardic so she was started on labetalol 100 mg twice daily. At subsequent clinic follow-up at 38w she was found to be hypertensive with an increase in creatinine. Oddly, proteinuria remained <0.3g/g but clinical picture concerning for preecclampsia prompting admission and induction for delivery. She delivered a healthy baby girl on 2023.     Interval hx:  She is currently pregnant 17 weeks.  She did experience some nausea during the first trimester but that has now resolved.  She is on labetalol 100 mg bid. BP at " home 109-116/64.  She denies any lower extremity edema.  Overall she is doing well.    Social history: She is  with 1 daughter.  Does not smoke or drink.  She is a  for middle school      Wt Readings from Last 4 Encounters:   12/20/24 59.9 kg (132 lb)   11/11/24 57.6 kg (127 lb)   07/23/24 55 kg (121 lb 3.2 oz)   04/02/24 55 kg (121 lb 4.8 oz)     Wt Readings from Last 4 Encounters:   12/20/24 59.9 kg (132 lb)   11/11/24 57.6 kg (127 lb)   07/23/24 55 kg (121 lb 3.2 oz)   04/02/24 55 kg (121 lb 4.8 oz)     No Known Allergies    Current Outpatient Medications   Medication Sig Dispense Refill    labetalol (NORMODYNE) 100 MG tablet Take 1 tablet (100 mg) by mouth 2 times daily 180 tablet 11    Prenatal Multivit-Min-Fe-FA (PRENATAL 1 + IRON OR) Take 1 tablet by mouth.      acetaminophen (TYLENOL) 500 MG tablet Take 1,000 mg by mouth every 6 hours. (Patient not taking: Reported on 11/1/2024)      amLODIPine (NORVASC) 10 MG tablet Take 10 mg by mouth daily. (Patient not taking: Reported on 11/1/2024)      amoxicillin-clavulanate (AUGMENTIN) 875-125 MG tablet Take 1 tablet by mouth 2 times daily. (Patient not taking: Reported on 11/1/2024)      ciprofloxacin (CILOXAN) 0.3 % ophthalmic solution 1-2 DROPS IN AFFECTED EYE EVERY 2HRS WHILE AWAKE X2 DAYS THEN EVERY 4HRS WHILE AWAKE FOR NEXT 5 DAYS (Patient not taking: Reported on 11/1/2024)      senna (SENOKOT) 8.6 MG tablet Take 8.6-17.2 mg by mouth. (Patient not taking: Reported on 11/1/2024)       No current facility-administered medications for this visit.     Fhx: HTN, CKD    Past Surgical History:   Procedure Laterality Date    WISDOM TOOTH EXTRACTION         Social History     Tobacco Use    Smoking status: Never    Smokeless tobacco: Never   Vaping Use    Vaping status: Never Used   Substance Use Topics    Alcohol use: Not Currently    Drug use: Never     ROS: A 12 system review of systems was negative other than noted here or above.     Exam:  LMP  08/23/2024   BP Readings from Last 6 Encounters:   12/20/24 138/70   11/11/24 126/69   07/23/24 130/73   04/02/24 128/82   01/02/24 128/83   11/08/23 124/74     Wt Readings from Last 4 Encounters:   12/20/24 59.9 kg (132 lb)   11/11/24 57.6 kg (127 lb)   07/23/24 55 kg (121 lb 3.2 oz)   04/02/24 55 kg (121 lb 4.8 oz)     EYES: PERRL  HENT: mouth without ulcers or lesions  NECK: supple, no adenopathy  RESP: lungs clear to auscultation - no rales, rhonchi or wheezes  CV: regular rhythm, normal rate, no rub   ABDOMEN:  soft, nontender, no HSM or masses and bowel sounds normal  MS: extremities normal- no gross deformities noted, no evidence of inflammation in joints, no muscle tenderness  SKIN: no rash  NEURO: Normal strength and tone, sensory exam grossly normal, mentation intact and speech normal  PSYCH: mentation appears normal. and affect normal/bright  No LE edema    Results:reviewed in details with the patient    ASSESSMENT AND RECOMMENDATIONS:   1.  Hx of preeclampsia September 2023:  Noted to have elevated blood pressure prior to delivery requiring labetalol for management. Ultimately seen at 38 weeks and noted to have continued hypertension with an elevated creatinine concerning for development of preeclampsia and advised further evaluation at L&D. On presentation to labor and delivery, she was again noted to have laboratory abnormalities suggesting renal dysfunction with increased creatinine, elevated uric acid.  Repeat Urine protein showed an elevated protein but undetectable creatinine on collected sample so a urine protein:creatinine ratio was unable to be calculated. Ultimately started on magnesium during delivery.  She was hospitalized for 4 days.  She delivered a healthy baby girl through a normal vaginal delivery and reports no delivery related complications.   -Will start aspirin 81 mg for prophylaxis against preeclampsia  -Will start the calcium 500 mg bid    2. CKD stage 2 with proteinuria: secondary  to hypoplastic/dysplastic right kidney. Of note, her iohexol in  was 76 ml/min (when Cr was 0.9 mg/dL). Currently, her UPCR is within normal limits. That puts her in the low risk category.  The risk of CKD progression with Pregnancy in early stages of CKD is low (<10%)[Shlomo 2015]. This risk in increased in the presence of uncontrolled HTN and proteinuria. She was also instructed about the slightly increased risk of pre-eclampsia when compared with women without renal disease[10x higher]. For that reason, Aspirin 81 mg is recommended starting 12 weeks of gestation till 36 weeks [Katty 2017]. Calcium supplementation 1000mg is also recommended at least 3 months prior to conception. In addition, there is a slightly increased risk of  delivery and SGA babies.     3. HTN:   BP at goal  -will continue labetolol 100 mg bid     Follow-up in 2-3 months    The longitudinal plan of care for this patient was addressed during this visit. Due to the added complexity in care, I will continue to support the patient with subsequent management of this condition(s) and with the ongoing continuity of care of this condition(s).     *This dictation was prepared in part using Dragon recognition software.  As a result errors may occur. When identified these transcription errors have been corrected.  While every attempt is made to correct errors during dictation, errors may still exist.     Barry Barrios MD   [facility]   Department of Medicine   Division of Renal Disease and Hypertension

## 2024-12-26 NOTE — NURSING NOTE
Current patient location: [address] [place] [id number]    Is the patient currently in the state of [place]? YES    Visit mode:VIDEO    If the visit is dropped, the patient can be reconnected by:VIDEO VISIT: Text to cell phone:   Telephone Information:   Mobile [phone]       Will anyone else be joining the visit? NO  (If patient encounters technical issues they should call [phone]    Are changes needed to the allergy or medication list? No    Are refills needed on medications prescribed by this physician? NO    Rooming Documentation:  Questionnaire(s) completed    Reason for visit: RECHECK    Cindi BANERJEEF

## 2024-12-26 NOTE — PATIENT INSTRUCTIONS
It was a pleasure taking care of you today.  I've included a brief summary of our discussion and care plan from today's visit below.  Please review this information with your primary care provider.     My recommendations are summarized as follows:  -Please monitor your Blood pressure at home  -will start aspirin and calcium for preeclampsia prophylaxis    Who do I call with any questions after my visit?  Please be in touch if there are any further questions that arise following today's visit.  There are multiple ways to contact your nephrology care team.       During business hours, you may reach your Nephrology Care Team or schedule or reschedule an appointment or lab at [phone].       If you need to schedule imaging, please call [phone].   To schedule a COVID test, please call [phone].     You can always send a secure message through [employer] software. [employer] software messages are answered by your nurse or doctor typically within 24-48 hours. Please allow extra time on weekends and holidays.       For urgent/emergent questions after business hours, you may reach the on-call Nephrology Fellow by contacting the [facility]  at [phone].     How will I get the results of any tests ordered?    You will receive all of your results.  If you have signed up for [employer] software, any tests ordered at your visit will be available to you once resulted on eOriginalhart. Typically the physician reviews them and may or may not make further recommendations. If there are urgent results that require a change in your care plan, your physician or nurse will call you to discuss the next steps. If you are not on eOriginal[employer], a letter may be generated and mailed to you with your results.

## 2024-12-26 NOTE — LETTER
"2024       RE: Charisma Mathew  [address] [facility] [address]     Dear Colleague,    Thank you for referring your patient, Charisma Mathew, to the [facility] NEPHROLOGY [facility]IC [place] [facility]. Please see a copy of my visit note below.    Virtual Visit Details    Type of service:  Video Visit     Originating Location (pt. Location): Home    Distant Location (provider location):  On-site  Platform used for Video Visit: Payam      CC:  -CKD stage I A2  -HTN secondary to CKD  -Probable Preeclampsia     HPI:  Charisma Valencia is a very pleasant 29 year old female  who presents for Follow-up on proteinuric CKD. She is now . She is now pregnant with her second pregnancy. She delivered her daughter \"[place]\" on 23.    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 She has been previously followed by Dr. oLrenzo Houston in the in the Pediatric Renal Clinic.  She has a history of long-standing asymptomatic non-nephrotic range isolated proteinuria and  hypertension. Her renal ultrasound on May, 2016 revealed a right kidney of 6.3 cm (with little growth by comparison) and left kidney 11.6 cm.     She denies any personal or family history of kidney stones or CKD/dialysis/kidney transplant. Her prenatal history is unremarkable as reported. No frequent urinary tract infections during her childhood.    In 2022, her lisinopril was stopped in anticipation of Pregnancy and she was started on nifedipine. Unfortunately, nifedipine caused her headache so she elected to stop it. During her visit in , her BP was elevated and she was slightly tachycardic so she was started on labetalol 100 mg twice daily. At subsequent clinic follow-up at 38w she was found to be hypertensive with an increase in creatinine. Oddly, proteinuria remained <0.3g/g but " clinical picture concerning for preecclampsia prompting admission and induction for delivery. She delivered a healthy baby girl on 9/20/2023.     Interval hx:  She is currently pregnant 17 weeks.  She did experience some nausea during the first trimester but that has now resolved.  She is on labetalol 100 mg bid. BP at home 109-116/64.  She denies any lower extremity edema.  Overall she is doing well.    Social history: She is  with 1 daughter.  Does not smoke or drink.  She is a  for middle school      Wt Readings from Last 4 Encounters:   12/20/24 59.9 kg (132 lb)   11/11/24 57.6 kg (127 lb)   07/23/24 55 kg (121 lb 3.2 oz)   04/02/24 55 kg (121 lb 4.8 oz)     Wt Readings from Last 4 Encounters:   12/20/24 59.9 kg (132 lb)   11/11/24 57.6 kg (127 lb)   07/23/24 55 kg (121 lb 3.2 oz)   04/02/24 55 kg (121 lb 4.8 oz)     No Known Allergies    Current Outpatient Medications   Medication Sig Dispense Refill     labetalol (NORMODYNE) 100 MG tablet Take 1 tablet (100 mg) by mouth 2 times daily 180 tablet 11     Prenatal Multivit-Min-Fe-FA (PRENATAL 1 + IRON OR) Take 1 tablet by mouth.       acetaminophen (TYLENOL) 500 MG tablet Take 1,000 mg by mouth every 6 hours. (Patient not taking: Reported on 11/1/2024)       amLODIPine (NORVASC) 10 MG tablet Take 10 mg by mouth daily. (Patient not taking: Reported on 11/1/2024)       amoxicillin-clavulanate (AUGMENTIN) 875-125 MG tablet Take 1 tablet by mouth 2 times daily. (Patient not taking: Reported on 11/1/2024)       ciprofloxacin (CILOXAN) 0.3 % ophthalmic solution 1-2 DROPS IN AFFECTED EYE EVERY 2HRS WHILE AWAKE X2 DAYS THEN EVERY 4HRS WHILE AWAKE FOR NEXT 5 DAYS (Patient not taking: Reported on 11/1/2024)       senna (SENOKOT) 8.6 MG tablet Take 8.6-17.2 mg by mouth. (Patient not taking: Reported on 11/1/2024)       No current facility-administered medications for this visit.     Fhx: HTN, CKD    Past Surgical History:   Procedure Laterality Date      WISDOM TOOTH EXTRACTION         Social History     Tobacco Use     Smoking status: Never     Smokeless tobacco: Never   Vaping Use     Vaping status: Never Used   Substance Use Topics     Alcohol use: Not Currently     Drug use: Never     ROS: A 12 system review of systems was negative other than noted here or above.     Exam:  LMP 08/23/2024   BP Readings from Last 6 Encounters:   12/20/24 138/70   11/11/24 126/69   07/23/24 130/73   04/02/24 128/82   01/02/24 128/83   11/08/23 124/74     Wt Readings from Last 4 Encounters:   12/20/24 59.9 kg (132 lb)   11/11/24 57.6 kg (127 lb)   07/23/24 55 kg (121 lb 3.2 oz)   04/02/24 55 kg (121 lb 4.8 oz)     EYES: PERRL  HENT: mouth without ulcers or lesions  NECK: supple, no adenopathy  RESP: lungs clear to auscultation - no rales, rhonchi or wheezes  CV: regular rhythm, normal rate, no rub   ABDOMEN:  soft, nontender, no HSM or masses and bowel sounds normal  MS: extremities normal- no gross deformities noted, no evidence of inflammation in joints, no muscle tenderness  SKIN: no rash  NEURO: Normal strength and tone, sensory exam grossly normal, mentation intact and speech normal  PSYCH: mentation appears normal. and affect normal/bright  No LE edema    Results:reviewed in details with the patient    ASSESSMENT AND RECOMMENDATIONS:   1.  Hx of preeclampsia September 2023:  Noted to have elevated blood pressure prior to delivery requiring labetalol for management. Ultimately seen at 38 weeks and noted to have continued hypertension with an elevated creatinine concerning for development of preeclampsia and advised further evaluation at L&D. On presentation to labor and delivery, she was again noted to have laboratory abnormalities suggesting renal dysfunction with increased creatinine, elevated uric acid.  Repeat Urine protein showed an elevated protein but undetectable creatinine on collected sample so a urine protein:creatinine ratio was unable to be calculated. Ultimately  started on magnesium during delivery.  She was hospitalized for 4 days.  She delivered a healthy baby girl through a normal vaginal delivery and reports no delivery related complications.   -Will start aspirin 81 mg for prophylaxis against preeclampsia  -Will start the calcium 500 mg bid    2. 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 Of note, her iohexol in  was 76 ml/min (when Cr was 0.9 mg/dL). Currently, her UPCR is within normal limits. That puts her in the low risk category.  The risk of CKD progression with Pregnancy in early stages of CKD is low (<10%)[Shlomo 2015]. This risk in increased in the presence of uncontrolled HTN and proteinuria. She was also instructed about the slightly increased risk of pre-eclampsia when compared with women without renal disease[10x higher]. For that reason, Aspirin 81 mg is recommended starting 12 weeks of gestation till 36 weeks [Katty 2017]. Calcium supplementation 1000mg is also recommended at least 3 months prior to conception. In addition, there is a slightly increased risk of  delivery and SGA babies.     3. HTN:   BP at goal  -will continue labetolol 100 mg bid     Follow-up in 2-3 months    The longitudinal plan of care for this patient was addressed during this visit. Due to the added complexity in care, I will continue to support the patient with subsequent management of this condition(s) and with the ongoing continuity of care of this condition(s).     *This dictation was prepared in part using Dragon recognition software.  As a result errors may occur. When identified these transcription errors have been corrected.  While every attempt is made to correct errors during dictation, errors may still exist.     Barry Barrios MD   [facility]   Department of Medicine   Division of Renal Disease and Hypertension                                          Again, thank you for allowing me to  participate in the care of your patient.      Sincerely,    ELZBIETA SCHWARTZ MD

## 2025-01-04 ENCOUNTER — HEALTH MAINTENANCE LETTER (OUTPATIENT)
Age: 30
End: 2025-01-04

## 2025-01-16 NOTE — PATIENT INSTRUCTIONS
If you wish to schedule another appointment, please call our office at [phone]. You can also make appointments through [facility]  Return to clinic:     Every 4 weeks till 28 weeks, then every 2 weeks till 36 weeks, then weekly till delivery    If anything comes up between your visits or you have concerns, please don't hesitate to contact us.                                               If you have labs or imaging done, the results will automatically release in [facility] without an interpretation.  Your health care professional will review those results and send an interpretation with recommendations as soon as possible, but this may be 1-3 business days.    If you have any questions regarding your visit, please contact your care team.     [employer] [facility]: [phone]  Women s Health CLINIC HOURS TELEPHONE NUMBER   Chanelle NevarezDOVince    Rosemarie -Surgery Scheduler  Yas -     SAMANTHA Schroeder, SAMANTHA Cage RN   [place]    Monday 8:30 am-5:00 pm  Wednesday 8:30 am-5:00 pm  Friday 8:30 am-5:00 pm    Typical Surgery day: Tuesday [facility]  [address]  [place] [address]  Phone:  [phone]  Fax: [phone]   Appointment [phone]    Imaging Scheduling-All Locations [phone]    [facility]  [address] [place] [address]   [place] [address] [phone]   **Surgeries** Our Surgery Schedulers will contact you to schedule. If you do not receive a call within 3 business days, please call [phone].  Urgent Care locations:  [facility] Monday-Friday   10 am - 8 pm  Saturday and    9 am - 5 pm [phone]   If you need a medication refill, please contact your pharmacy. Please allow 3 business days for your refill to be completed.  As always, Thank you for trusting us with your healthcare needs!  see additional instructions from your care team below

## 2025-01-20 ENCOUNTER — ANCILLARY PROCEDURE (OUTPATIENT)
Dept: ULTRASOUND IMAGING | Facility: CLINIC | Age: 30
End: 2025-01-20
Attending: OBSTETRICS & GYNECOLOGY
Payer: COMMERCIAL

## 2025-01-20 ENCOUNTER — TELEPHONE (OUTPATIENT)
Dept: OBGYN | Facility: CLINIC | Age: 30
End: 2025-01-20

## 2025-01-20 ENCOUNTER — TRANSCRIBE ORDERS (OUTPATIENT)
Dept: MATERNAL FETAL MEDICINE | Facility: CLINIC | Age: 30
End: 2025-01-20

## 2025-01-20 ENCOUNTER — PRENATAL OFFICE VISIT (OUTPATIENT)
Dept: OBGYN | Facility: CLINIC | Age: 30
End: 2025-01-20
Payer: COMMERCIAL

## 2025-01-20 VITALS
WEIGHT: 136.6 LBS | BODY MASS INDEX: 20.61 KG/M2 | HEART RATE: 70 BPM | SYSTOLIC BLOOD PRESSURE: 118 MMHG | DIASTOLIC BLOOD PRESSURE: 77 MMHG | OXYGEN SATURATION: 98 %

## 2025-01-20 DIAGNOSIS — O26.90 PREGNANCY RELATED CONDITION, ANTEPARTUM: Primary | ICD-10-CM

## 2025-01-20 DIAGNOSIS — Z34.82 ENCOUNTER FOR SUPERVISION OF OTHER NORMAL PREGNANCY IN SECOND TRIMESTER: ICD-10-CM

## 2025-01-20 DIAGNOSIS — O28.3 ABNORMAL ANTENATAL ULTRASOUND: Primary | ICD-10-CM

## 2025-01-20 DIAGNOSIS — Z34.82 ENCOUNTER FOR SUPERVISION OF OTHER NORMAL PREGNANCY IN SECOND TRIMESTER: Primary | ICD-10-CM

## 2025-01-20 LAB — RADIOLOGIST FLAGS: NORMAL

## 2025-01-20 PROCEDURE — 76805 OB US >/= 14 WKS SNGL FETUS: CPT | Performed by: RADIOLOGY

## 2025-01-20 PROCEDURE — 99207 PR PRENATAL VISIT: CPT | Performed by: OBSTETRICS & GYNECOLOGY

## 2025-01-20 NOTE — PROGRESS NOTES
She reports feeling reassuring daily fetal activity and will continue to record.  She gained 4 lbs since her last visit and denies any fluid leakage or regular uterine contractions.  She had her 20-week screening OB US today with results pending.  She declines the NIPTs, carrier, and aFP tests.

## 2025-01-20 NOTE — TELEPHONE ENCOUNTER
[facility] imaging.     20w5d      Patient was seen earlier today at 9:00 am with Dr. Nevarez    Would like provider to know the ultrasound completed today show:      4. The cavum septum pellucidum was not visualized. Abnormal facial  profile. Consultation with Maternal Fetal Medicine is recommended.    Routing to provider.     Chanell ROWE RN - [facility]

## 2025-01-21 ENCOUNTER — OFFICE VISIT (OUTPATIENT)
Dept: MATERNAL FETAL MEDICINE | Facility: HOSPITAL | Age: 30
End: 2025-01-21
Attending: OBSTETRICS & GYNECOLOGY
Payer: COMMERCIAL

## 2025-01-21 ENCOUNTER — ANCILLARY PROCEDURE (OUTPATIENT)
Dept: ULTRASOUND IMAGING | Facility: HOSPITAL | Age: 30
End: 2025-01-21
Attending: OBSTETRICS & GYNECOLOGY
Payer: COMMERCIAL

## 2025-01-21 ENCOUNTER — PRE VISIT (OUTPATIENT)
Dept: MATERNAL FETAL MEDICINE | Facility: HOSPITAL | Age: 30
End: 2025-01-21

## 2025-01-21 DIAGNOSIS — Z36.2 ENCOUNTER FOR FOLLOW-UP ULTRASOUND OF FETAL ANATOMY: Primary | ICD-10-CM

## 2025-01-21 DIAGNOSIS — Z03.73 FETAL ANOMALY SUSPECTED BUT NOT FOUND: ICD-10-CM

## 2025-01-21 DIAGNOSIS — O16.3 HYPERTENSION AFFECTING PREGNANCY, THIRD TRIMESTER: ICD-10-CM

## 2025-01-21 DIAGNOSIS — O26.90 PREGNANCY RELATED CONDITION, ANTEPARTUM: ICD-10-CM

## 2025-01-21 DIAGNOSIS — O28.3 ABNORMAL FETAL ULTRASOUND: Primary | ICD-10-CM

## 2025-01-21 PROCEDURE — 76811 OB US DETAILED SNGL FETUS: CPT

## 2025-01-21 PROCEDURE — 96041 GENETIC COUNSELING SVC EA 30: CPT | Performed by: GENETIC COUNSELOR, MS

## 2025-01-21 NOTE — PROGRESS NOTES
Please see the imaging tab for details of the ultrasound performed today.    Stephanie Bell MD  Specialist in Maternal-Fetal Medicine

## 2025-01-21 NOTE — PROGRESS NOTES
"[facility]  Genetic Counseling Consult    Patient:  Charisma Mathew  Preferred Name: Charisma YOB: 1995   Date of Service:  25   MRN: [id number]    Charisma was seen at the [facility] for genetic consultation. The indication for genetic counseling is abnormal fetal ultrasound at outside clinic. The patient was accompanied to this visit by their partner, Elton.    The session was conducted in English.      IMPRESSION/ PLAN   1. Charisma has not had genetic screening in this pregnancy and declines options discussed today.    2. During today's [facility] visit, Charisma had a genetic counseling session only. Screening and diagnostic testing was discussed and declined.     3. Charisma had a level II comprehensive anatomy ultrasound today. Please see the ultrasound report for further details.    4. Further recommendation include a follow-up ultrasound with [facility]. The upcoming ultrasound has been scheduled for 2025.    PREGNANCY HISTORY   /Parity:       Charisma's pregnancy history is significant for:   : Term, daughter, elevated blood pressures and postpartum preeclampsia     CURRENT PREGNANCY   Current Age: 29 year old   Age at Delivery: 29 year old  EMILY: 2025, by Ultrasound                                   Gestational Age: 20w6d  This pregnancy is a single gestation.   This pregnancy was conceived spontaneously.    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 She is managed with labetalol        FAMILY HISTORY   A three-generation pedigree was obtained today and is scanned under the \"Media\" tab in Epic. The family history was reported by Charisma and their partner.    The following significant findings were reported today:   The father of the pregnancy, Elton, is healthy.   Charisma and Elton have a healthy daughter    Charisma's father  of colon cancer (diagnosed at 58). Her " paternal grandmother also had colon cancer (later in life). She also reports a maternal aunt with brain cancer. She was encouraged to discuss this family history with her primary provider to make sure she starts screening colonoscopies at the appropriate age. She could also consider cancer genetic counseling.     Otherwise, the reported family history is unremarkable for multiple miscarriages, stillbirths, birth defects, intellectual disabilities, known genetic conditions, and consanguinity.       RISK ASSESSMENT FOR INHERITED CONDITIONS AND CARRIER SCREENING OPTIONS   Expanded carrier screening is available to screen for autosomal recessive conditions and X-linked conditions in a large list of genes. Carrier screening does not test the pregnancy but gives a risk assessment for the pregnancy and future pregnancies to have the condition. Expanded carrier screening is designed to identify carrier status for conditions that are primarily childhood or adolescent onset. Expanded carrier screening does not evaluate for adult-onset conditions such as hereditary cancer syndromes, dementia/ Alzheimer's disease, or cardiovascular disease risk factors. Additionally, expanded carrier screening is not comprehensive for all known genetic diseases or inherited conditions. Carrier screening does not test for all genetic and health conditions or risk factors.     Autosomal recessive conditions happen when a mutation has been inherited from the egg and sperm and include conditions like cystic fibrosis, thalassemia, hearing loss, spinal muscular atrophy, and more. We reviewed that when both biological parents carry a harmful genetic change in a gene associated with autosomal recessive inheritance, each of their pregnancies has a 1 in 4 (25%) chance to be affected by that condition. X-linked conditions happen when a mutation has been inherited from the egg and include conditions like fragile X syndrome.With x-linked conditions, the  specific risk generally depends on the chromosomal sex of the fetus, with XY individuals (generally male) being most severely affected.     [place] screening and carrier screening was not reviewed due to our focus on other topics.     RISK ASSESSMENT FOR CHROMOSOME CONDITIONS   We explained that the risk for fetal chromosome abnormalities increases with maternal age. We discussed specific features of common chromosome abnormalities, including trisomy 21 (Down syndrome), trisomy 13, trisomy 18, and sex chromosome trisomies.    At age 29 at midtrimester, the risk to have a baby with Down syndrome is 1 in 760.   At age 29 at midtrimester, the risk to have a baby with any chromosome abnormality is 1 in 380.     Charisma has not had genetic screening in this pregnancy and declines options discussed today.     GENETIC TESTING OPTIONS   Genetic testing during a pregnancy includes screening and diagnostic procedures.      Screening tests are non-invasive which means no risk to the pregnancy and includes ultrasounds and blood work. The benefits and limitations of screening were reviewed. Screening tests provide a risk assessment (chance) specific to the pregnancy for certain fetal chromosome abnormalities but cannot definitively diagnose or exclude a fetal chromosome abnormality. Follow-up genetic counseling and consideration of diagnostic testing is recommended with any abnormal screening result. Diagnostic testing during a pregnancy is more certain and can test for more conditions. However, the tests do have a risk of miscarriage that requires careful consideration. These tests can detect fetal chromosome abnormalities with greater than 99% certainty. Results can be compromised by maternal cell contamination or mosaicism and are limited by the resolution of current genetic testing technology.     There is no screening or diagnostic test that detects all forms of birth defects or intellectual disability.     We discussed the  following screening options:     Non-invasive prenatal testing (NIPT)  Also called cell-free DNA screening because it detects chromosomes from the placenta in the pregnant person's blood  Can be done any time after 10 weeks gestation  Standard recommendation for NIPT screens for trisomy 21, trisomy 18, trisomy 13, with the option of adding sex chromosome aneuploidies, without or without predicted sex  Cannot screen for open neural tube defects, maternal serum AFP after 15 weeks is recommended  New NIPT options include screening for other trisomies, microdeletion syndromes, and in some cases fetal blood antigens. Guidelines do not recommend these conditions are included in standard screening. These options have limitations and should be discussed with a genetic counselor.   However, current (2023) ACMG guidelines do recommend that screening for one microdeletion syndrome, called 22q11.2 deletion syndrome be offered to all pregnant patients. 22q11.2 deletion syndrome has an estimated prevalence of 1 in 990 to 1 in 2148 (0.05-0.1%). Risk is not thought to increase with maternal age. Clinical features are variable but include congenital heart defects, cleft palate, developmental delays, immune system deficiencies, and hearing loss. Approximately 90% of cases are de bruno (a sporadic new change in a pregnancy). Cell-free DNA screening for 22q11.2 deletion syndrome is available with the inclusion of other microdeletion syndromes. There is less data about the performance of cell-free DNA screening for more rare microdeletions and the chance for false positives or negative may be increased.  We discussed the limitations of cell-free DNA screening in detecting microdeletions and the possibility of false positives and false negatives.     We discussed the following ultrasound options:    Nuchal translucency (NT) ultrasound  Ultrasound between 29n0b-01a9h that includes nuchal translucency measurement and nasal bone  assessments  Nuchal translucency refers to the space at the back of the neck where fluid builds up. All babies at this stage have fluid and there is only concern if there is too much fluid  Nasal bone refers to the small bone in the nose. There is concern for conditions like Down syndrome if the bone cannot be seen at all  This ultrasound can be done as part of first trimester screening, at the same time as another screen (NIPT), at the same time as a CVS, or if the patients does not want genetic screening.  Markers on ultrasound detects about 70% of pregnancies with aneuploidy  Abnormalities on NT ultrasound can also increase the risk for a birth defect, like a heart defect    Comprehensive level II ultrasound (Fetal Anatomy Ultrasound)  Ultrasound done between 18-20 weeks gestation  Screens for major birth defects and markers for aneuploidy (like trisomy 21 and trisomy 18)  Includes looking at the fetus/baby's growth, heart, organs (stomach, kidneys), placenta, and amniotic fluid    We discussed the following diagnostic options:     Chorionic villus sampling (CVS)  Invasive diagnostic procedure done between 10w0d and 13w6d  The procedure collects a small sample from the placenta for the purpose of chromosomal testing and/or other genetic testing  Diagnostic result; more than 99% sensitivity for fetal chromosome abnormalities  Cannot screen for open neural tube defects, maternal serum AFP after 15 weeks is recommended    Amniocentesis  Invasive diagnostic procedure done after 15 weeks gestation  The procedure collects a small sample of amniotic fluid for the purpose of chromosomal testing and/or other genetic testing  Diagnostic result; more than 99% sensitivity for fetal chromosome abnormalities  Testing for AFP in the amniotic fluid can test for open neural tube defects    It was a pleasure to be involved with Charisma madsen care. Time spent on the day of encounter was 45 minutes.    Waleska Giordano, GC, MS, [facility]  Board  Certified and [place] Licensed Genetic Counselor  [facility]  Maternal Fetal Medicine  Office: [phone]  [facility]: [phone]   Fax: [phone]  [facility]

## 2025-02-13 NOTE — PATIENT INSTRUCTIONS
If you wish to schedule another appointment, please call our office at [phone]. You can also make appointments through [employer]  Return to clinic:     Every 4 weeks till 28 weeks, then every 2 weeks till 36 weeks, then weekly till delivery    If anything comes up between your visits or you have concerns, please don't hesitate to contact us.                                              If you have labs or imaging done, the results will automatically release in [employer] without an interpretation.  Your health care professional will review those results and send an interpretation with recommendations as soon as possible, but this may be 1-3 business days.    If you have any questions regarding your visit, please contact your care team.     Virtual [employer] Access Services: [phone]  Women s Health CLINIC HOURS TELEPHONE NUMBER   Chanelle NevarezDOVince    Rosemarie -Surgery Scheduler  Yas -     SAMANTHA Schroeder, SAMANTHA Cage RN   [place]    Monday 8:30 am-5:00 pm  Wednesday 8:30 am-5:00 pm  Friday 8:30 am-5:00 pm    Typical Surgery day: Tuesday [facility]  [address]  [place] [address]  Phone:  [phone]  Fax: [phone]   Appointment [phone]    Imaging Scheduling-All Locations [phone]    [facility] Labor and Delivery  [address] [place] [address]   [place] [address] [phone]   **Surgeries** Our Surgery Schedulers will contact you to schedule. If you do not receive a call within 3 business days, please call [phone].  Urgent Care locations:  [facility] Monday-Friday   10 am - 8 pm  Saturday and    9 am - 5 pm [phone]   If you need a medication refill, please contact your pharmacy. Please allow 3 business days for your refill to be completed.  As always, Thank you for trusting us with your healthcare needs!  see additional instructions from your care team below    Learning About Screening for  Gestational Diabetes  What is gestational diabetes screening?     Screening for gestational diabetes is a way to look for high blood sugar during pregnancy. You drink some very sweet liquid. Then you have a blood test to see how your body uses sugar (glucose).  How is gestational diabetes screening done?  Screening for gestational diabetes may be done in a couple of ways.  Two-part screening.  Part one (glucose challenge test): A blood sample is taken after you drink a liquid that contains sugar (glucose). You don't need to stop eating or drinking before this test. If the test shows that you don't have a lot of sugar in your blood, you don't have gestational diabetes.  Part two (oral glucose tolerance test, or OGTT): If the first test shows a lot of sugar in your blood, then you may have an OGTT. You can't eat or drink for at least 8 hours before this test. A blood sample is taken, then you drink a sweet liquid. You have more blood tests after 1 to 3 hours. If the OGTT shows that you have a lot of sugar in your blood, you may have gestational diabetes.  One-part screening.  Sometimes doctors use the OGTT on its own. If the test shows that you don't have a lot of sugar in your blood, you don't have gestational diabetes. If you do have a lot of sugar in your blood, you may have the condition.  What are the risks of screening?  Your blood glucose level may drop very low toward the end of the test. If this happens, you may feel weak, hungry, and restless. Tell your doctor if you have these symptoms. The test usually will be stopped.  You may vomit after drinking the sweet liquid. If this happens, you may need to take the test at a later time.  Your doctor may do more glucose tests at other times during your pregnancy.  Follow-up care is a key part of your treatment and safety. Be sure to make and go to all appointments, and call your doctor if you are having problems. It's also a good idea to know your test results and  "keep a list of the medicines you take.  Where can you learn more?  Go to https://www.Sidense.net/patiented  Enter A472 in the search box to learn more about \"Learning About Screening for Gestational Diabetes.\"  Current as of: 2024  Content Version: 14.3    2024 [employer].   Care instructions adapted under license by your healthcare professional. If you have questions about a medical condition or this instruction, always ask your healthcare professional. [employer] disclaims any warranty or liability for your use of this information.    Weeks 26 to 30 of Your Pregnancy: Care Instructions  You're starting your last trimester. You'll probably feel your baby moving around more. Your back may ache as your body gets used to your baby's size and length. Take care of yourself, and pay attention to what your body needs.    Talk to your doctor about getting the Tdap shot. It will help protect your  against whooping cough (pertussis). Also ask your doctor about flu and COVID-19 shots if you haven't had them yet. If your blood type is Rh negative, you may be given a shot of Rh immune globulin (such as RhoGAM). It can help prevent problems for your baby.   You may have Sequoyah-Pulido contractions. They are single or several strong contractions without a pattern. These are practice contractions but not the start of labor.   Be kind to yourself.       Take breaks when you're tired.  Change positions often. Don't sit for too long or stand for too long.  At work, rest during breaks if you can. If you don't get breaks, talk to your doctor about writing a letter to your employer to request them.  Avoid fumes, chemicals, and tobacco smoke.  Be sexual if you want to.       You may be interested in sex, or you may not. Everyone is different.  Sex is okay unless your doctor tells you not to.  Your belly can make it hard to find good positions for sex. [place] and explore.  Watch for signs of " " labor.        These signs include:  Menstrual-like cramps. Or you may have pain or pressure in your pelvis that happens in a pattern.  About 6 or more contractions in an hour (even after rest and a glass of water).  A low, dull backache that doesn't go away when you change positions.  An increase or change in vaginal discharge.  Light vaginal bleeding or spotting.  Your water breaking.  Know what to do if you think you are having contractions.       Drink 1 or 2 glasses of water.  Lie down on your left side for at least an hour.  While on your side, feel the top of your belly to see if it's tight.  Write down your contractions for an hour. Time how long it is from the start of one contraction to the start of the next.  Call your doctor if you have regular contractions.  Follow-up care is a key part of your treatment and safety. Be sure to make and go to all appointments, and call your doctor if you are having problems. It's also a good idea to know your test results and keep a list of the medicines you take.  Where can you learn more?  Go to https://www.STARFACE.net/patiented  Enter S999 in the search box to learn more about \"Weeks 26 to 30 of Your Pregnancy: Care Instructions.\"  Current as of: 2024  Content Version: 14.3    2024 [employer].   Care instructions adapted under license by your healthcare professional. If you have questions about a medical condition or this instruction, always ask your healthcare professional. [employer] disclaims any warranty or liability for your use of this information.    "

## 2025-02-17 ENCOUNTER — PRENATAL OFFICE VISIT (OUTPATIENT)
Dept: OBGYN | Facility: CLINIC | Age: 30
End: 2025-02-17
Payer: COMMERCIAL

## 2025-02-17 VITALS
WEIGHT: 144 LBS | SYSTOLIC BLOOD PRESSURE: 111 MMHG | OXYGEN SATURATION: 99 % | BODY MASS INDEX: 21.72 KG/M2 | HEART RATE: 96 BPM | DIASTOLIC BLOOD PRESSURE: 73 MMHG

## 2025-02-17 DIAGNOSIS — Z13.1 SCREENING FOR DIABETES MELLITUS: Primary | ICD-10-CM

## 2025-02-17 DIAGNOSIS — Z34.82 ENCOUNTER FOR SUPERVISION OF OTHER NORMAL PREGNANCY IN SECOND TRIMESTER: ICD-10-CM

## 2025-02-17 DIAGNOSIS — O09.92 SUPERVISION OF HIGH RISK PREGNANCY IN SECOND TRIMESTER: Primary | ICD-10-CM

## 2025-02-17 LAB
GLUCOSE 1H P 50 G GLC PO SERPL-MCNC: 151 MG/DL (ref 70–129)
HGB BLD-MCNC: 11.3 G/DL (ref 11.7–15.7)
T PALLIDUM AB SER QL: NONREACTIVE

## 2025-02-17 PROCEDURE — 82950 GLUCOSE TEST: CPT | Performed by: OBSTETRICS & GYNECOLOGY

## 2025-02-17 PROCEDURE — 86780 TREPONEMA PALLIDUM: CPT | Performed by: OBSTETRICS & GYNECOLOGY

## 2025-02-17 PROCEDURE — 36415 COLL VENOUS BLD VENIPUNCTURE: CPT | Performed by: OBSTETRICS & GYNECOLOGY

## 2025-02-17 PROCEDURE — 99207 PR PRENATAL VISIT: CPT | Performed by: OBSTETRICS & GYNECOLOGY

## 2025-02-17 NOTE — PROGRESS NOTES
She reports feeling reassuring daily fetal activity and will continue to record.  She gained 7 lbs since her last visit and denies any fluid leakage or uterine contractions.  Due to her hx of chronic kidney disease and chronic hypertension, she saw [facility] on 1/21 and her next visit is on 3/12/2025.  She does not need a refill of Labetalol today.  Will check labwork today and she is not a rhogam candidate since her blood type is O+.

## 2025-03-10 ENCOUNTER — VIRTUAL VISIT (OUTPATIENT)
Dept: EDUCATION SERVICES | Facility: CLINIC | Age: 30
End: 2025-03-10
Attending: OBSTETRICS & GYNECOLOGY
Payer: COMMERCIAL

## 2025-03-10 DIAGNOSIS — O24.419 GESTATIONAL DIABETES MELLITUS, ANTEPARTUM: Primary | ICD-10-CM

## 2025-03-10 DIAGNOSIS — O24.410 DIET CONTROLLED GESTATIONAL DIABETES MELLITUS (GDM) IN SECOND TRIMESTER: ICD-10-CM

## 2025-03-10 PROCEDURE — G0109 DIAB MANAGE TRN IND/GROUP: HCPCS | Mod: 95

## 2025-03-10 RX ORDER — LANCETS
EACH MISCELLANEOUS
Qty: 200 EACH | Refills: 1 | Status: SHIPPED | OUTPATIENT
Start: 2025-03-10

## 2025-03-10 NOTE — LETTER
3/10/2025         RE: Charisma Mathew  [address] [address]        Dear Colleague,    Thank you for referring your patient, Charisma Mathew, to the [facility]. Please see a copy of my visit note below.    No notes on file

## 2025-03-10 NOTE — GROUP NOTE
"Diabetes Diabetes Self-Management Education & Support  3/10/2025  Virtual GDM Class via Medical Zoom  Patient Location: [place]  Provider Location: Home - [facility]  Start and End Time  Start Time: 1304  End Time: 1424    Subjective/Objective  Charisma is an 29 year old year old, presenting for the following diabetes education related to: Gestational Diabetes  How confident are you filling out medical forms by yourself:: (Patient-Rptd) Quite a bit    Cultural Influences/Ethnic Background:  Not  or     Estimated Date of Delivery: Jun 4, 2025    1 hour OGTT  Lab Results   Component Value Date    GLU1 151 (H) 02/17/2025       3 hour OGTT    Fasting  No results found for: \"GLF\"    1 hour  No results found for: \"GL1\"    2 hour  No results found for: \"GL2\"    3 hour  No results found for: \"GL3\"    INTERVENTION:   Patient was instructed on glucose meter and was provided with resources for further information as needed.    Educational topics covered today:  GDM diagnosis, pathophysiology, risks and complications of GDM, means of controlling GDM, using a blood glucose monitor, blood glucose goals, logging and interpreting glucose results, ketone testing, when to call a diabetes educator or OB provider, healthy eating during pregnancy, counting carbohydrates, meal planning for GDM, and physical activity    Educational materials emailed today:   Oksana Understanding Gestational Diabetes  GDM Log Sheet  Sharps Disposal  Care After Delivery  GDM Food Log Sheet  HS Snack List    Plan  Test glucose 4 times per day:   Fasting (when you first awake for the day): 95 mg/dL or below   1 hour after breakfast: 140 mg/dL or below   1 hour after lunch: 140 mg/dL or below   1 hour after dinner: 140 mg/dL or below     Please bring your meter and log book to all appointments     If you miss 1 hour after meal test, test 2 hours after the meal.  Goal 2 hours after is 120 mg/dL or below.     2.  Check your urine ketones once a day, " when you first awake for the day until they are negative to trace for 7 days in a row.  Then decrease and check once a week.     3.  Meal Plan    Breakfast: 30 grams carbohydrate + protein   Snack: 15-30 grams carbohydrate + protein  Lunch: 45-60 grams carbohydrate + protein  Snack: 15-30 grams carbohydrate + protein  Dinner: 45-60 grams carbohydrate + protein  Snack: 15-30 grams carbohydrate + protein    A few tips:   -consume some carbohydrate every 2-3 hours while awake   -you need a minimum of 175 grams of carbohydrate per day   -fruit and cold breakfast cereal are best tolerated at lunch or later   -protein includes: cheese, eggs, fish, nuts, nut butter, chicken, turkey, beef, and pork   -snack ideas: an individual container of Greek yogurt (try Chobani Less Sugar), whole grain crackers and cheese, chocolate fairlife milk, a Kashi or KIND bar, a baseball size piece of whole fruit + nut butter (apple + peanut butter), fruit canned in it's own juice + cottage cheese    4.  Aim for 20-30 minutes of activity most days of the week (with the okay of your OB provider).      5.  Call Diabetes Education at [phone] or send a Sch[employer] message with:   -questions or concerns   -ketones that are small, moderate, or large   -3 or more blood sugars above target in a 7 day period    Thank you,     Grace Minaya, MPH, RD, CDCES, LD 3/10/2025

## 2025-03-10 NOTE — PATIENT INSTRUCTIONS
Test glucose 4 times per day:              Fasting (when you first awake for the day): 95 mg/dL or below              1 hour after breakfast: 140 mg/dL or below              1 hour after lunch: 140 mg/dL or below              1 hour after dinner: 140 mg/dL or below                 Please bring your meter and log book to all appointments                 If you miss 1 hour after meal test, test 2 hours after the meal.  Goal 2 hours after is 120 mg/dL or below.      2.  Check your urine ketones once a day, when you first awake for the day until they are negative to trace for 7 days in a row.  Then decrease and check once a week.      3.  Meal Plan     Breakfast: 30 grams carbohydrate + protein   Snack: 15-30 grams carbohydrate + protein  Lunch: 45-60 grams carbohydrate + protein  Snack: 15-30 grams carbohydrate + protein  Dinner: 45-60 grams carbohydrate + protein  Snack: 15-30 grams carbohydrate + protein     A few tips:              -consume some carbohydrate every 2-3 hours while awake              -you need a minimum of 175 grams of carbohydrate per day              -fruit and cold breakfast cereal are best tolerated at lunch or later              -protein includes: cheese, eggs, fish, nuts, nut butter, chicken, turkey, beef, and pork              -snack ideas: an individual container of Greek yogurt (try Chobani Less Sugar), whole grain crackers and cheese, chocolate fairlife milk, a Kashi or KIND bar, a baseball size piece of whole fruit + nut butter (apple + peanut butter), fruit canned in it's own juice + cottage cheese     4.  Aim for 20-30 minutes of activity most days of the week (with the okay of your OB provider).       5.  Call Diabetes Education at [phone] or send a [employer] message with:              -questions or concerns              -ketones that are small, moderate, or large              -3 or more blood sugars above target in a 7 day period

## 2025-03-12 ENCOUNTER — ANCILLARY PROCEDURE (OUTPATIENT)
Dept: ULTRASOUND IMAGING | Facility: HOSPITAL | Age: 30
End: 2025-03-12
Attending: OBSTETRICS & GYNECOLOGY
Payer: COMMERCIAL

## 2025-03-12 ENCOUNTER — OFFICE VISIT (OUTPATIENT)
Dept: MATERNAL FETAL MEDICINE | Facility: HOSPITAL | Age: 30
End: 2025-03-12
Attending: OBSTETRICS & GYNECOLOGY
Payer: COMMERCIAL

## 2025-03-12 ENCOUNTER — DOCUMENTATION ONLY (OUTPATIENT)
Dept: NEPHROLOGY | Facility: CLINIC | Age: 30
End: 2025-03-12
Payer: COMMERCIAL

## 2025-03-12 DIAGNOSIS — Z36.2 ENCOUNTER FOR FOLLOW-UP ULTRASOUND OF FETAL ANATOMY: ICD-10-CM

## 2025-03-12 DIAGNOSIS — N18.2 CKD (CHRONIC KIDNEY DISEASE) STAGE 2, GFR 60-89 ML/MIN: Primary | ICD-10-CM

## 2025-03-12 DIAGNOSIS — O35.09X0 PREGNANCY COMPLICATED BY FETAL CEREBRAL VENTRICULOMEGALY, SINGLE OR UNSPECIFIED FETUS: Primary | ICD-10-CM

## 2025-03-12 DIAGNOSIS — O35.8XX0 UMBILICAL VEIN ABNORMALITY AFFECTING PREGNANCY, SINGLE OR UNSPECIFIED FETUS: ICD-10-CM

## 2025-03-12 PROCEDURE — 99214 OFFICE O/P EST MOD 30 MIN: CPT | Mod: 25 | Performed by: OBSTETRICS & GYNECOLOGY

## 2025-03-12 PROCEDURE — 76816 OB US FOLLOW-UP PER FETUS: CPT | Mod: 26 | Performed by: OBSTETRICS & GYNECOLOGY

## 2025-03-12 PROCEDURE — 76816 OB US FOLLOW-UP PER FETUS: CPT

## 2025-03-12 NOTE — NURSING NOTE
Charisma Mathew is a  at 28w0d who presents to [facility] for follow up growth ultrasound. Pt reports positive fetal movement. Pt denies bldg/lof/change in discharge, contractions, headache, vision changes, chest pain/SOB or edema. SBAR given to Dr. Powell, see note in Epic.

## 2025-03-12 NOTE — PROGRESS NOTES
Pt has a 3 month return appointment scheduled with Dr. Barrios 3/25/25,  with a Lab appointment prior.  Future lab orders needed.  Routing the encounter to Dr. Barrios.    Michelle King LPN

## 2025-03-13 NOTE — PROGRESS NOTES
"Please see \"Imaging\" tab under \"Chart Review\" for details of today's visit.    Abhishek Powell    "

## 2025-03-25 ENCOUNTER — VIRTUAL VISIT (OUTPATIENT)
Dept: EDUCATION SERVICES | Facility: CLINIC | Age: 30
End: 2025-03-25
Payer: COMMERCIAL

## 2025-03-25 ENCOUNTER — OFFICE VISIT (OUTPATIENT)
Dept: NEPHROLOGY | Facility: CLINIC | Age: 30
End: 2025-03-25
Payer: COMMERCIAL

## 2025-03-25 ENCOUNTER — LAB (OUTPATIENT)
Dept: LAB | Facility: CLINIC | Age: 30
End: 2025-03-25
Payer: COMMERCIAL

## 2025-03-25 VITALS
HEART RATE: 95 BPM | OXYGEN SATURATION: 100 % | SYSTOLIC BLOOD PRESSURE: 111 MMHG | DIASTOLIC BLOOD PRESSURE: 69 MMHG | BODY MASS INDEX: 22.37 KG/M2 | WEIGHT: 148.3 LBS

## 2025-03-25 DIAGNOSIS — D63.1 ANEMIA IN STAGE 1 CHRONIC KIDNEY DISEASE: Primary | ICD-10-CM

## 2025-03-25 DIAGNOSIS — O24.410 DIET CONTROLLED GESTATIONAL DIABETES MELLITUS (GDM) IN SECOND TRIMESTER: Primary | ICD-10-CM

## 2025-03-25 DIAGNOSIS — N18.1 STAGE 1 CHRONIC KIDNEY DISEASE: ICD-10-CM

## 2025-03-25 DIAGNOSIS — N18.1 ANEMIA IN STAGE 1 CHRONIC KIDNEY DISEASE: Primary | ICD-10-CM

## 2025-03-25 DIAGNOSIS — N18.2 CKD (CHRONIC KIDNEY DISEASE) STAGE 2, GFR 60-89 ML/MIN: ICD-10-CM

## 2025-03-25 LAB
ALBUMIN MFR UR ELPH: 13.2 MG/DL
ALBUMIN SERPL BCG-MCNC: 3.7 G/DL (ref 3.5–5.2)
ALBUMIN UR-MCNC: NEGATIVE MG/DL
ANION GAP SERPL CALCULATED.3IONS-SCNC: 13 MMOL/L (ref 7–15)
APPEARANCE UR: CLEAR
BACTERIA #/AREA URNS HPF: ABNORMAL /HPF
BILIRUB UR QL STRIP: NEGATIVE
BUN SERPL-MCNC: 17.3 MG/DL (ref 6–20)
CALCIUM SERPL-MCNC: 8.8 MG/DL (ref 8.8–10.4)
CHLORIDE SERPL-SCNC: 103 MMOL/L (ref 98–107)
COLOR UR AUTO: ABNORMAL
CREAT SERPL-MCNC: 0.74 MG/DL (ref 0.51–0.95)
CREAT UR-MCNC: 105 MG/DL
EGFRCR SERPLBLD CKD-EPI 2021: >90 ML/MIN/1.73M2
ERYTHROCYTE [DISTWIDTH] IN BLOOD BY AUTOMATED COUNT: 12.4 % (ref 10–15)
GLUCOSE SERPL-MCNC: 98 MG/DL (ref 70–99)
GLUCOSE UR STRIP-MCNC: NEGATIVE MG/DL
HCO3 SERPL-SCNC: 21 MMOL/L (ref 22–29)
HCT VFR BLD AUTO: 32 % (ref 35–47)
HGB BLD-MCNC: 11.1 G/DL (ref 11.7–15.7)
HGB UR QL STRIP: NEGATIVE
KETONES UR STRIP-MCNC: NEGATIVE MG/DL
LEUKOCYTE ESTERASE UR QL STRIP: ABNORMAL
MCH RBC QN AUTO: 31.1 PG (ref 26.5–33)
MCHC RBC AUTO-ENTMCNC: 34.7 G/DL (ref 31.5–36.5)
MCV RBC AUTO: 90 FL (ref 78–100)
NITRATE UR QL: NEGATIVE
PH UR STRIP: 6 [PH] (ref 5–7)
PHOSPHATE SERPL-MCNC: 2.8 MG/DL (ref 2.5–4.5)
PLATELET # BLD AUTO: 176 10E3/UL (ref 150–450)
POTASSIUM SERPL-SCNC: 3.9 MMOL/L (ref 3.4–5.3)
PROT/CREAT 24H UR: 0.13 MG/MG CR (ref 0–0.2)
RBC # BLD AUTO: 3.57 10E6/UL (ref 3.8–5.2)
RBC #/AREA URNS AUTO: ABNORMAL /HPF
SODIUM SERPL-SCNC: 137 MMOL/L (ref 135–145)
SP GR UR STRIP: 1.02 (ref 1–1.03)
SQUAMOUS #/AREA URNS AUTO: ABNORMAL /LPF
TRANS CELLS #/AREA URNS HPF: ABNORMAL /HPF
UROBILINOGEN UR STRIP-MCNC: NORMAL MG/DL
WBC # BLD AUTO: 10.7 10E3/UL (ref 4–11)
WBC #/AREA URNS AUTO: ABNORMAL /HPF

## 2025-03-25 PROCEDURE — 84156 ASSAY OF PROTEIN URINE: CPT

## 2025-03-25 PROCEDURE — 81001 URINALYSIS AUTO W/SCOPE: CPT

## 2025-03-25 PROCEDURE — G0108 DIAB MANAGE TRN  PER INDIV: HCPCS | Mod: 95 | Performed by: DIETITIAN, REGISTERED

## 2025-03-25 PROCEDURE — 80069 RENAL FUNCTION PANEL: CPT

## 2025-03-25 PROCEDURE — 36415 COLL VENOUS BLD VENIPUNCTURE: CPT

## 2025-03-25 PROCEDURE — 85027 COMPLETE CBC AUTOMATED: CPT

## 2025-03-25 NOTE — PATIENT INSTRUCTIONS
Plan  Test glucose 4 times per day:              Fasting (when you first awake for the day): 95 mg/dL or below              1 hour after breakfast: 140 mg/dL or below              1 hour after lunch: 140 mg/dL or below              1 hour after dinner: 140 mg/dL or below                 Please bring your meter and log book to all appointments                 If you miss 1 hour after meal test, test 2 hours after the meal.  Goal 2 hours after is 120 mg/dL or below.      2.  Check your urine ketones once a week.      3.  Meal Plan     Breakfast: 30 grams carbohydrate + protein   Snack: 15-30 grams carbohydrate + protein  Lunch: 45-60 grams carbohydrate + protein  Snack: 15-30 grams carbohydrate + protein  Dinner: 45-60 grams carbohydrate + protein  Snack: 15-30 grams carbohydrate + protein     A few tips:              -consume some carbohydrate every 2-3 hours while awake              -you need a minimum of 175 grams of carbohydrate per day              -fruit and cold breakfast cereal are best tolerated at lunch or later              -protein includes: cheese, eggs, fish, nuts, nut butter, chicken, turkey, beef, and pork              -snack ideas: an individual container of Greek yogurt (try Chobani Less Sugar), whole grain crackers and cheese, chocolate fairlife milk, a Kashi or KIND bar, a baseball size piece of whole fruit + nut butter (apple + peanut butter), fruit canned in it's own juice + cottage cheese     Protein list (choose 2 = 14 g protein)  Carbohydrate list (choose 1-2 = 30 g carb*)   1 string cheese     1/2 whole grain English muffin  1/4 cup cottage cheese    1 slice whole grain bread  1 ounce cooked meat (the size    1/2 of a bun (hamburger or hot dog)  of a golf ball or meatball)   1 toaster waffle (no syrup)             1 ounce canned tuna or chicken   1/2 cup cooked oatmeal  2 breakfast sausage links    1/3 cup cooked brown rice or pasta  1 hot dog or veggie dog     5 Triscuit crackers  1.5 ounces  cocktail shrimp (about   cup) 1/2 of a  Bagel Thin   no breading     1/2 of a whole wheat priscilla  1 egg       1  6-inch flour tortilla  1/2 [place] or [place] griller   1  6-inch or 2 4 -inch corn tortillas  burger (vegetarian/vegan)   1 fruit serving  3 ounces tofu     15 potato chips  1.5 ounces tempeh (about   cup)   10 corn tortilla chips  2 tablespoons nut butter    1/2 cup full fat ice cream  1/4 cup cup peanuts, almonds, pistachios,  5-6 ounces light or Greek yogurt  pumpkin seeds or 1/3 cup walnuts  3 cup popped popcorn  4 ounces (1/2 cup) Fairlife brand milk  1/2 cup sweet potato   8 ounces dairy milk    ~ *Check labels for total carb as flavors and brands may vary slightly.  ~ You can add a tablespoon of mayonnaise, mustard, cream cheese, margarine, sour cream, horseradish, carb-free salad dressing, hot sauce, or salsa for flavor.       4.  Aim for 20-30 minutes of activity most days of the week (with the okay of your OB provider).       5.  Call Diabetes Education at [phone] or send a [employer] message with:              -questions or concerns              -ketones that are small, moderate, or large              -3 or more blood sugars above target in a 7 day period    An Maynard RD, [facility], 2:41 PM, 3/25/2025

## 2025-03-25 NOTE — LETTER
3/25/2025         RE: Charisma Mathew  [address] [place] [address]        Dear Colleague,    Thank you for referring your patient, Charisma Mathew, to the [facility]. Please see a copy of my visit note below.    Diabetes Self-Management Education & Support    Type of service:  Video Visit    If the video visit is dropped, the video visit invitation should be resent by: Text to cell phone: [phone]    Originating Location (pt. Location): Home  Distant Location (provider location): [facility]  Mode of Communication:  Video Conference via "[employer]    Video Start Time:  2:01pm  Video End Time (time video stopped): 2:31pm    How would patient like to obtain AVS? MyChart      Assessment  Ketones: negative.   Fasting blood glucoses: 57% in target.  After breakfast: 100% in target.  After lunch: 67% in target.  After dinner: 100% in target.    She did not have any questions after her initial visit for GDM.  She felt with the class and the information that was emailed she was understanding the diagnosis and plan.  However, she had stopped testing ketones after 7 negative in a row.  She will start testing ketones once weekly.  Dietary recall reveals understanding of carbohydrate counting.  Rarely is she having a morning or afternoon snack.  She is willing to start having some snacks and see if that makes a difference in her readings.  We reviewed the meal plan and information noted below.  She verbalized understanding.  Due to 57% of FBG in target, she is willing to send in her BG readings in one week to be reviewed or sooner if needed.      Intervention  Educational topics covered today:  What to expect after delivery, future testing for Type 2 diabetes (2 hour OGTT at 6 week post-partum check-up and annual fasting blood glucose level), risk of GDM and planning ahead for future pregnancies, recommended lifestyle interventions for reducing the risk  of Type 2 Diabetes, when to call a Diabetes Educator or OB provider    Educational Materials provided today:  Oksana Preventing Diabetes    Patient verbalized understanding of diabetes self-management education concepts discussed, opportunities for ongoing education and support, and recommendations provided today    Plan  Check glucose four times daily, before breakfast and 1 hour after each meal.  Check ketones once a week.  Continue with recommended physical activity.  Continue to follow recommended meal plan: 30-45g carbohydratess at breakfast, 45-60g carbohydratess at lunch, 45-60g carbohydrates at supper, 15-30g carbohydrates at snacks. (Try adding snacks).    Follow consistent carbohydrate meal plan, eat carbohydrates and protein/fat at all meals/snacks.    Send in Glucose Log (blood sugars) via [employer] in 7 days. If 3 or more blood sugars are above the goal at a given time, or if Ketones are small, moderate or large, call or [employer] message the diabetes educator.        Subjective/Objective  Charisma is an 29 year old year old, presenting for the following diabetes education related to:      Accompanied by: Self  Diabetes management related comments/concerns: No questions at this time  Gestational weeks: 29w6d  [facility] planned for delivery: [place]  Next OB Visit Date: 03/28/25  Number of previous pregnancies: 1  Had any babies over 9 lbs: No  Previously had Gestational Diabetes: No  Have you ever had thyroid problems or taken thyroid medication?: No  Heart disease, mitral valve prolapse or rheumatic fever?: No  Hypertension : (!) Yes  High Cholesterol: No  High Triglycerides: No  Do you use tobacco products?: No  Do you drink beer, wine or hard liquor?: No    Cultural Influences/Ethnic Background:  Not  or       LMP 08/23/2024       Estimated Date of Delivery: Jun 4, 2025    Blood Glucose/Ketone Log:   States tested ketones 7 days in a row and all were negative so has not been testing  again  *2hr pp  Date Ketones Fasting Post Breakfast Post Lunch Post Supper   3/25 - 90 103 114 1.5 hrs pp    3/24 - 97 130 102* 107 *   3/23 - 97 122 118 112   3/22 - 108 was sick this day - - -   3/21 - 87 115 124* 134   3/20 - 87 117 109* 127   3/19 - 94 105 142* 129       Healthy Eating:  Pre-pregnancy weight (lbs): 125  Barrier to exercise: Time  Cultural/Baptism diet restrictions?: No  Meal planning/habits: None  How many times a week on average do you eat food made away from home (restaurant/take-out)?: 0  Meals include: Breakfast, Lunch, Dinner, Evening Snack  Breakfast: 2 sl whole grain toast w/ pb, protein yogurt drink ( around 45 grams of carb per pt)  Lunch: chicken salad, veggies (cucumbers peppers), 2 sl cheese, cutie, pop corners (popcorn chips), water (around 29 g of carb per pt)  Dinner: small steak, mac n cheese, peas, milk (around 60 grams of carb per pt)  Snacks: AM: no snack; PM:  nothing; HS: cottage cheese, fruit cup  Beverages: Water, Milk, Juice  How many servings of fruits/vegetables per day: 3  Biggest challenges to healthy eating: Evening snacking  Pre- vitamin?: Yes  Supplements?: Yes  List supplements currently taking: Calcium, Vitamin C  Experiencing nausea?: No  Experiencing heartburn?: No    Healthy Coping:  Informal Support system:: Family, Spouse  Stage of change: ACTION (Actively working towards change)    Current Management:  Taking medications for gestational diabetes?: No  Difficulty affording diabetes testing supplies?: No    DALTON VIVAS  Time Spent: 30 minutes  Encounter Type: Individual     Diabetes medication dose changes were made via the CDCES Standing Orders under the patient's referring provider.

## 2025-03-25 NOTE — PROGRESS NOTES
Diabetes Self-Management Education & Support    Type of service:  Video Visit    If the video visit is dropped, the video visit invitation should be resent by: Text to cell phone: [phone]    Originating Location (pt. Location): Home  Distant Location (provider location): [facility]  Mode of Communication:  Video Conference via [employer]    Video Start Time:  2:01pm  Video End Time (time video stopped): 2:31pm    How would patient like to obtain AVS? MyChart      Assessment  Ketones: negative.   Fasting blood glucoses: 57% in target.  After breakfast: 100% in target.  After lunch: 67% in target.  After dinner: 100% in target.    She did not have any questions after her initial visit for GDM.  She felt with the class and the information that was emailed she was understanding the diagnosis and plan.  However, she had stopped testing ketones after 7 negative in a row.  She will start testing ketones once weekly.  Dietary recall reveals understanding of carbohydrate counting.  Rarely is she having a morning or afternoon snack.  She is willing to start having some snacks and see if that makes a difference in her readings.  We reviewed the meal plan and information noted below.  She verbalized understanding.  Due to 57% of FBG in target, she is willing to send in her BG readings in one week to be reviewed or sooner if needed.      Intervention  Educational topics covered today:  What to expect after delivery, future testing for Type 2 diabetes (2 hour OGTT at 6 week post-partum check-up and annual fasting blood glucose level), risk of GDM and planning ahead for future pregnancies, recommended lifestyle interventions for reducing the risk of Type 2 Diabetes, when to call a Diabetes Educator or OB provider    Educational Materials provided today:  [facility] Diabetes    Patient verbalized understanding of diabetes self-management education concepts discussed, opportunities for  ongoing education and support, and recommendations provided today    Plan  Check glucose four times daily, before breakfast and 1 hour after each meal.  Check ketones once a week.  Continue with recommended physical activity.  Continue to follow recommended meal plan: 30-45g carbohydratess at breakfast, 45-60g carbohydratess at lunch, 45-60g carbohydrates at supper, 15-30g carbohydrates at snacks. (Try adding snacks).    Follow consistent carbohydrate meal plan, eat carbohydrates and protein/fat at all meals/snacks.    Send in Glucose Log (blood sugars) via [email or web address] in 7 days. If 3 or more blood sugars are above the goal at a given time, or if Ketones are small, moderate or large, call or [email or web address] message the diabetes educator.        Subjective/Objective  Charisma is an 29 year old year old, presenting for the following diabetes education related to:      Accompanied by: Self  Diabetes management related comments/concerns: No questions at this time  Gestational weeks: 29w6d  Hospital planned for delivery: [facility]  Next OB Visit Date: 03/28/25  Number of previous pregnancies: 1  Had any babies over 9 lbs: No  Previously had Gestational Diabetes: No  Have you ever had thyroid problems or taken thyroid medication?: No  Heart disease, mitral valve prolapse or rheumatic fever?: No  Hypertension : (!) Yes  High Cholesterol: No  High Triglycerides: No  Do you use tobacco products?: No  Do you drink beer, wine or hard liquor?: No    Cultural Influences/Ethnic Background:  Not  or       LMP 08/23/2024       Estimated Date of Delivery: Jun 4, 2025    Blood Glucose/Ketone Log:   States tested ketones 7 days in a row and all were negative so has not been testing again  *2hr pp  Date Ketones Fasting Post Breakfast Post Lunch Post Supper   3/25 - 90 103 114 1.5 hrs pp    3/24 - 97 130 102* 107 *   3/23 - 97 122 118 112   3/22 - 108 was sick this day - - -   3/21 - 87 115 124* 134   3/20 - 87 117 109* 127   3/19 - 94  105 142* 129       Healthy Eating:  Pre-pregnancy weight (lbs): 125  Barrier to exercise: Time  Cultural/Yarsanism diet restrictions?: No  Meal planning/habits: None  How many times a week on average do you eat food made away from home (restaurant/take-out)?: 0  Meals include: Breakfast, Lunch, Dinner, Evening Snack  Breakfast: 2 sl whole grain toast w/ pb, protein yogurt drink ( around 45 grams of carb per pt)  Lunch: chicken salad, veggies (cucumbers peppers), 2 sl cheese, cutie, pop corners (popcorn chips), water (around 29 g of carb per pt)  Dinner: small steak, mac n cheese, peas, milk (around 60 grams of carb per pt)  Snacks: AM: no snack; PM:  nothing; HS: cottage cheese, fruit cup  Beverages: Water, Milk, Juice  How many servings of fruits/vegetables per day: 3  Biggest challenges to healthy eating: Evening snacking  Pre-sonal vitamin?: Yes  Supplements?: Yes  List supplements currently taking: Calcium, Vitamin C  Experiencing nausea?: No  Experiencing heartburn?: No    Healthy Coping:  Informal Support system:: Family, Spouse  Stage of change: ACTION (Actively working towards change)    Current Management:  Taking medications for gestational diabetes?: No  Difficulty affording diabetes testing supplies?: No    DALTON VIVAS  Time Spent: 30 minutes  Encounter Type: Individual     Diabetes medication dose changes were made via the CDCES Standing Orders under the patient's referring provider.

## 2025-03-25 NOTE — NURSING NOTE
Charisma Mathew's goals for this visit include:   Chief Complaint   Patient presents with    RECHECK     3 month follow up CKD and proteinuria        She requests these members of her care team be copied on today's visit information: no     PCP: Yvette Cedeno    Referring Provider:  Referred Self, MD  No address on file    /69 (BP Location: Left arm, Patient Position: Chair, Cuff Size: Adult Regular)   Pulse 95   Wt 67.3 kg (148 lb 4.8 oz)   LMP 08/23/2024   SpO2 100%   BMI 22.37 kg/m      Do you need any medication refills at today's visit? No     TRICIA Evans   Neph/Pulm [facility]

## 2025-03-25 NOTE — PROGRESS NOTES
"CC:  -CKD stage I A2  -HTN secondary to CKD  -Probable Preeclampsia     HPI:  Charisma Valencia is a very pleasant 29 year old female  who presents for Follow-up on proteinuric CKD. She is now . She is now pregnant with her second pregnancy. She delivered her daughter \"Marcellus\" on 23. She is due 2025.    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 She has been previously followed by Dr. Lorenzo Houston in the [facility].  She has a history of long-standing asymptomatic non-nephrotic range isolated proteinuria and  hypertension. Her renal ultrasound on May, 2016 revealed a right kidney of 6.3 cm (with little growth by comparison) and left kidney 11.6 cm.     She denies any personal or family history of kidney stones or CKD/dialysis/kidney transplant. Her prenatal history is unremarkable as reported. No frequent urinary tract infections during her childhood.    In 2022, her lisinopril was stopped in anticipation of Pregnancy and she was started on nifedipine. Unfortunately, nifedipine caused her headache so she elected to stop it. During her visit in , her BP was elevated and she was slightly tachycardic so she was started on labetalol 100 mg twice daily. At subsequent clinic follow-up at 38w she was found to be hypertensive with an increase in creatinine. Oddly, proteinuria remained <0.3g/g but clinical picture concerning for preecclampsia prompting admission and induction for delivery. She delivered a healthy baby girl on 2023.     Interval hx:  She was diagnosed with gestational diabetes. She is payting attention to her sugars.  BP readings 109//73. She is currently pregnant 29 w 6d.  She is on labetalol 100 mg bid.   She denies any lower extremity edema.    Overall she is doing well.    Social history: She is  with 1 daughter.  Does not smoke or drink.  " She is a  for middle school.    No Known Allergies    Current Outpatient Medications   Medication Sig Dispense Refill    acetone urine (KETOSTIX) test strip Use one strip daily to check urine ketones in the morning per  directions. 50 strip 1    aspirin 81 MG EC tablet Take 1 tablet (81 mg) by mouth daily. 90 tablet 3    blood glucose (NO BRAND SPECIFIED) test strip Use to test blood sugar 4 times daily or as directed. To accompany: Blood Glucose Monitor Brands: per insurance. 150 strip 2    blood glucose monitoring (NO BRAND SPECIFIED) meter device kit Use to test blood sugar 4 times daily or as directed. Preferred blood glucose meter OR supplies to accompany: Blood Glucose Monitor Brands: per insurance. 1 kit 0    calcium carbonate (OS-MERCY) 500 MG tablet Take 1 tablet (500 mg) by mouth 2 times daily. 180 tablet 3    labetalol (NORMODYNE) 100 MG tablet Take 1 tablet (100 mg) by mouth 2 times daily 180 tablet 11    Prenatal Multivit-Min-Fe-FA (PRENATAL 1 + IRON OR) Take 1 tablet by mouth.      thin (NO BRAND SPECIFIED) lancets Use with lancing device to check glucose 4 times daily per  direction. To accompany: Blood Glucose Monitor Brands: per insurance. 200 each 1     No current facility-administered medications for this visit.     Fhx: HTN, CKD    Past Surgical History:   Procedure Laterality Date    WISDOM TOOTH EXTRACTION         Social History     Tobacco Use    Smoking status: Never    Smokeless tobacco: Never   Vaping Use    Vaping status: Never Used   Substance Use Topics    Alcohol use: Not Currently    Drug use: Never     ROS: A 12 system review of systems was negative other than noted here or above.     Exam:  /69 (BP Location: Left arm, Patient Position: Chair, Cuff Size: Adult Regular)   Pulse 95   Wt 67.3 kg (148 lb 4.8 oz)   LMP 08/23/2024   SpO2 100%   BMI 22.37 kg/m    BP Readings from Last 6 Encounters:   03/25/25 111/69   03/14/25 112/65   02/17/25  111/73   01/20/25 118/77   12/20/24 138/70   11/11/24 126/69     Wt Readings from Last 4 Encounters:   03/25/25 67.3 kg (148 lb 4.8 oz)   03/14/25 66.9 kg (147 lb 8 oz)   02/17/25 65.3 kg (144 lb)   01/20/25 62 kg (136 lb 9.6 oz)     EYES: PERRL  HENT: mouth without ulcers or lesions  NECK: supple, no adenopathy  RESP: lungs clear to auscultation - no rales, rhonchi or wheezes  CV: regular rhythm, normal rate, no rub   ABDOMEN:  soft, nontender, no HSM or masses and bowel sounds normal  MS: extremities normal- no gross deformities noted, no evidence of inflammation in joints, no muscle tenderness  SKIN: no rash  NEURO: Normal strength and tone, sensory exam grossly normal, mentation intact and speech normal  PSYCH: mentation appears normal. and affect normal/bright  No LE edema    Results:reviewed in details with the patient    ASSESSMENT AND RECOMMENDATIONS:   1.  Hx of preeclampsia September 2023:  Noted to have elevated blood pressure prior to delivery requiring labetalol for management. Ultimately, she was seen at 38 weeks and noted to have continued hypertension with an elevated creatinine concerning for development of preeclampsia and advised further evaluation at L&D. On presentation to labor and delivery, she was again noted to have laboratory abnormalities suggesting renal dysfunction with increased creatinine, elevated uric acid.  Repeat Urine protein showed an elevated protein. Ultimately, she was started on magnesium during delivery.  She was hospitalized for 4 days.  She delivered a healthy baby girl through a normal vaginal delivery and reports no delivery related complications.   -Will continue aspirin 81 mg for prophylaxis against preeclampsia  -Will continue calcium 500 mg bid  -She was instructed to continue to check her BP daily at home and report any high readings, LE edema or headache.    2. 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 Of note, her iohexol in  2012 was 76 ml/min (when Cr was 0.9 mg/dL). Currently, her UPCR is within normal limits. That puts her in the low risk category.  The risk of CKD progression with Pregnancy in early stages of CKD is low (<10%)[Shlomo 2015]. This risk in increased in the presence of uncontrolled HTN and proteinuria. She was also instructed about the slightly increased risk of pre-eclampsia when compared with women without renal disease[10x higher]. For that reason, Aspirin 81 mg is recommended starting 12 weeks of gestation till 36 weeks [Katty 2017]. Calcium supplementation 1000mg is also recommended at least 3 months prior to conception. In addition, there is a slightly increased risk of  delivery and SGA babies.     3. Chronic HTN:   BP at goal  -will continue labetolol 100 mg bid     4. Gestational DM:  Currently diet controlled. She was instructed that this is a risk factor for developing DM later in life and to continue to follow healthy life style.    5. Mild Metabolic acidosis: expected with pregnancy, compensatory to respiratory alkalosis    6. Anemia: likely dilutional secondary to pregnancy    She will be followed by OB every 2 weeks. Follow-up with nephrology  in 6 months. Follow-up labs in 2 months.    The longitudinal plan of care for this patient was addressed during this visit. Due to the added complexity in care, I will continue to support the patient with subsequent management of this condition(s) and with the ongoing continuity of care of this condition(s).     *This dictation was prepared in part using Dragon recognition software.  As a result errors may occur. When identified these transcription errors have been corrected.  While every attempt is made to correct errors during dictation, errors may still exist.     Barry Barrios MD   [facility]   Department of Medicine   Division of Renal Disease and Hypertension

## 2025-03-25 NOTE — PATIENT INSTRUCTIONS
It was a pleasure taking care of you today.  I've included a brief summary of our discussion and care plan from today's visit below.  Please review this information with your primary care provider.     My recommendations are summarized as follows:  Lab tests last week of May     Who do I call with any questions after my visit?  Please be in touch if there are any further questions that arise following today's visit.  There are multiple ways to contact your nephrology care team.       During business hours, you may reach your Nephrology Care Team or schedule or reschedule an appointment or lab at [phone].       If you need to schedule imaging, please call [phone].   To schedule a COVID test, please call [phone].     You can always send a secure message through [employer]. [employer] messages are answered by your nurse or doctor typically within 24-48 hours. Please allow extra time on weekends and holidays.       For urgent/emergent questions after business hours, you may reach the on-call Nephrology Fellow by contacting the [facility] [place] [place]  at [phone].     How will I get the results of any tests ordered?    You will receive all of your results.  If you have signed up for [employer], any tests ordered at your visit will be available to you once resulted on [employer]. Typically the physician reviews them and may or may not make further recommendations. If there are urgent results that require a change in your care plan, your physician or nurse will call you to discuss the next steps. If you are not on [employer], a letter may be generated and mailed to you with your results.

## 2025-03-28 ENCOUNTER — ANCILLARY PROCEDURE (OUTPATIENT)
Dept: ULTRASOUND IMAGING | Facility: HOSPITAL | Age: 30
End: 2025-03-28
Attending: OBSTETRICS & GYNECOLOGY
Payer: COMMERCIAL

## 2025-03-28 DIAGNOSIS — O35.8XX0 UMBILICAL VEIN ABNORMALITY AFFECTING PREGNANCY, SINGLE OR UNSPECIFIED FETUS: ICD-10-CM

## 2025-03-28 PROCEDURE — 76816 OB US FOLLOW-UP PER FETUS: CPT | Mod: 26 | Performed by: STUDENT IN AN ORGANIZED HEALTH CARE EDUCATION/TRAINING PROGRAM

## 2025-03-28 PROCEDURE — 76816 OB US FOLLOW-UP PER FETUS: CPT

## 2025-04-02 NOTE — PATIENT INSTRUCTIONS
"Weeks 32 to 34 of Your Pregnancy: Care Instructions    Decide whether you want to bank or donate your baby's umbilical cord blood. If you want to save this blood, you have to arrange for it ahead of time.   Decide about circumcision. Personal, Restoration, or cultural beliefs may play a role in your decision. You get to decide what you want for your baby.         Learn how to ease hemorrhoids.   Get more liquids, fruits, vegetables, and fiber in your diet.  Avoid sitting for too long.  Clean yourself with moist toilet paper. Or try witch hazel pads.  Try ice packs or warm sitz baths for discomfort.  Use hydrocortisone cream for pain or itching.  Ask your doctor about stool softeners.        Consider the benefits of breastfeeding.   It reduces your baby's risk of sudden infant death syndrome (SIDS).   babies are less likely to get certain infections. And they're less likely to be obese or get diabetes later in life.  It can lower your risk of breast and ovarian cancers and osteoporosis.  It saves you money.  Follow-up care is a key part of your treatment and safety. Be sure to make and go to all appointments, and call your doctor if you are having problems. It's also a good idea to know your test results and keep a list of the medicines you take.  Where can you learn more?  Go to https://www.[email or web address].net/patiented  Enter X711 in the search box to learn more about \"Weeks 32 to 34 of Your Pregnancy: Care Instructions.\"  Current as of: April 30, 2024  Content Version: 14.4    0144-5343 [employer].   Care instructions adapted under license by your healthcare professional. If you have questions about a medical condition or this instruction, always ask your healthcare professional. [employer] disclaims any warranty or liability for your use of this information.    You have been provided the Any Day Now: Early Labor at Home document.    Additional copies can be found here:  " Patients GI provider:  Dr. York    Number to return call: [phone]    Reason for call: Pt called in wanting to know if he can complete the cologuard instead of proceeding with the colonoscopy. Above is his call back number.     Scheduled procedure/appointment date if applicable: procedure 7/23/24     [email or web address]  You have been provided the What I'd Wish I'd Known About Giving Birth document.    Additional copies can be found here:  [email or web address]                                                         If you have any questions regarding your visit, Please contact your care team.     [facility] Access Services: [phone]    To Schedule an Appointment 24/7  Call: [phone]     Luisito Rivas MD  Medical Director        Fauzia-SAMANTHA Schroeder-SAMANTHA Houston-Surgery Scheduler  Yas-Surgery Scheduler               Tuesday-[place]  7:30 a.m-4:30 p.m    Thursday-[place]  7:30 a.m-4:30 p.m    Typical Surgery Days: Tuesday or Friday [facility]  [address] [place] [address]  PH: [phone]    Imaging Scheduling all locations  PH: [phone]     [facility] Labor and Delivery  [address] [place] [address]   [place] [address]  PH: [phone]    [facility]  [address] [place] [address]  PH: [phone] Fax      **Surgeries** Our Surgery Schedulers will contact you to schedule. If you do not receive a call within 3 business days, please call [phone].    Urgent Care locations:  [facility] Monday-Friday  10 am - 8 pm  Saturday and Sunday   9 am - 5 pm  Monday-Friday   10 am - 8 pm  Saturday and Sunday   9 am - 5 pm   [phone]   If you need a medication refill, please contact your pharmacy. Please allow 3 business days for your refill to be completed.  As always, Thank you for trusting us with your healthcare needs!    see additional instructions from your care team below

## 2025-04-09 ENCOUNTER — PRENATAL OFFICE VISIT (OUTPATIENT)
Dept: OBGYN | Facility: CLINIC | Age: 30
End: 2025-04-09
Payer: COMMERCIAL

## 2025-04-09 VITALS
BODY MASS INDEX: 22.76 KG/M2 | HEART RATE: 80 BPM | DIASTOLIC BLOOD PRESSURE: 70 MMHG | SYSTOLIC BLOOD PRESSURE: 119 MMHG | WEIGHT: 150.9 LBS

## 2025-04-09 DIAGNOSIS — O16.3 HYPERTENSION AFFECTING PREGNANCY IN THIRD TRIMESTER: ICD-10-CM

## 2025-04-09 DIAGNOSIS — O24.410 DIET CONTROLLED GESTATIONAL DIABETES MELLITUS (GDM), ANTEPARTUM: Primary | ICD-10-CM

## 2025-04-09 DIAGNOSIS — N18.1 CKD (CHRONIC KIDNEY DISEASE) STAGE 1, GFR 90 ML/MIN OR GREATER: ICD-10-CM

## 2025-04-09 PROCEDURE — 0502F SUBSEQUENT PRENATAL CARE: CPT | Performed by: OBSTETRICS & GYNECOLOGY

## 2025-04-09 PROCEDURE — 3074F SYST BP LT 130 MM HG: CPT | Performed by: OBSTETRICS & GYNECOLOGY

## 2025-04-09 PROCEDURE — 3078F DIAST BP <80 MM HG: CPT | Performed by: OBSTETRICS & GYNECOLOGY

## 2025-04-09 PROCEDURE — 99207 PR PRENATAL VISIT: CPT | Performed by: OBSTETRICS & GYNECOLOGY

## 2025-04-09 NOTE — PROGRESS NOTES
Patient presents for routine prenatal visit at 32w0d.  Patient without complaint. Reports good sugar control  PE: See OB vitals  Body mass index is 22.76 kg/m .    Doing well.  No concerns today.  No vaginal bleeding, loss of fluid, or contractions  Questions asked and answered.    Start weekly visits and BPPs  Continuing with growth U/S q 4 week  Luisito Rivas MD FACOG

## 2025-04-14 ENCOUNTER — ANCILLARY PROCEDURE (OUTPATIENT)
Dept: ULTRASOUND IMAGING | Facility: OTHER | Age: 30
End: 2025-04-14
Attending: OBSTETRICS & GYNECOLOGY
Payer: COMMERCIAL

## 2025-04-14 DIAGNOSIS — O09.93 SUPERVISION OF HIGH RISK PREGNANCY IN THIRD TRIMESTER: ICD-10-CM

## 2025-04-14 DIAGNOSIS — O10.919 CHRONIC HYPERTENSION AFFECTING PREGNANCY: ICD-10-CM

## 2025-04-14 PROCEDURE — 76819 FETAL BIOPHYS PROFIL W/O NST: CPT | Mod: TC | Performed by: RADIOLOGY

## 2025-04-18 ENCOUNTER — ANCILLARY PROCEDURE (OUTPATIENT)
Dept: ULTRASOUND IMAGING | Facility: HOSPITAL | Age: 30
End: 2025-04-18
Attending: OBSTETRICS & GYNECOLOGY
Payer: COMMERCIAL

## 2025-04-18 DIAGNOSIS — O35.8XX0 UMBILICAL VEIN ABNORMALITY AFFECTING PREGNANCY, SINGLE OR UNSPECIFIED FETUS: ICD-10-CM

## 2025-04-18 PROCEDURE — 76819 FETAL BIOPHYS PROFIL W/O NST: CPT

## 2025-04-18 PROCEDURE — 76816 OB US FOLLOW-UP PER FETUS: CPT

## 2025-04-18 PROCEDURE — 76816 OB US FOLLOW-UP PER FETUS: CPT | Mod: 26 | Performed by: OBSTETRICS & GYNECOLOGY

## 2025-04-18 PROCEDURE — 99213 OFFICE O/P EST LOW 20 MIN: CPT | Mod: 25 | Performed by: OBSTETRICS & GYNECOLOGY

## 2025-04-18 PROCEDURE — 76819 FETAL BIOPHYS PROFIL W/O NST: CPT | Mod: 26 | Performed by: OBSTETRICS & GYNECOLOGY

## 2025-04-24 ENCOUNTER — ANCILLARY PROCEDURE (OUTPATIENT)
Dept: ULTRASOUND IMAGING | Facility: CLINIC | Age: 30
End: 2025-04-24
Attending: OBSTETRICS & GYNECOLOGY
Payer: COMMERCIAL

## 2025-04-24 DIAGNOSIS — O09.93 SUPERVISION OF HIGH RISK PREGNANCY IN THIRD TRIMESTER: ICD-10-CM

## 2025-04-24 DIAGNOSIS — O10.919 CHRONIC HYPERTENSION AFFECTING PREGNANCY: ICD-10-CM

## 2025-05-02 ENCOUNTER — HOSPITAL ENCOUNTER (OUTPATIENT)
Dept: ULTRASOUND IMAGING | Facility: CLINIC | Age: 30
Discharge: HOME | End: 2025-05-02
Attending: OBSTETRICS & GYNECOLOGY
Payer: COMMERCIAL

## 2025-05-02 ENCOUNTER — MYC MEDICAL ADVICE (OUTPATIENT)
Dept: OBGYN | Facility: CLINIC | Age: 30
End: 2025-05-02

## 2025-05-02 DIAGNOSIS — O35.8XX0 UMBILICAL VEIN ABNORMALITY AFFECTING PREGNANCY, SINGLE OR UNSPECIFIED FETUS: ICD-10-CM

## 2025-05-02 PROCEDURE — 76816 OB US FOLLOW-UP PER FETUS: CPT | Mod: 26 | Performed by: OBSTETRICS & GYNECOLOGY

## 2025-05-02 PROCEDURE — 76819 FETAL BIOPHYS PROFIL W/O NST: CPT

## 2025-05-02 PROCEDURE — 76819 FETAL BIOPHYS PROFIL W/O NST: CPT | Mod: 26 | Performed by: OBSTETRICS & GYNECOLOGY

## 2025-05-02 PROCEDURE — 99213 OFFICE O/P EST LOW 20 MIN: CPT | Mod: 25 | Performed by: OBSTETRICS & GYNECOLOGY

## 2025-05-02 NOTE — TELEPHONE ENCOUNTER
Amanda received from patient asking if she needs to stop ASA at GA 36 weeks? And states M recommended induction at 37 weeks.      35w2d      Next prenatal: 2025.     Will route inquiry to Dr. Nevarez to see if provider would like to begin induction scheduling process now or wait until patient is seen on Wednesday?     Chanell ROWE RN - [facility]

## 2025-05-05 NOTE — TELEPHONE ENCOUNTER
"Chanelle Nevarez, DO [facility] Triage  Phone Number: [phone]   \"We can schedule her induction at her next visit since this cannot be scheduled more than 7 days ahead of time.  She should take her baby aspirin until 36 weeks gestation.\"  "

## 2025-05-05 NOTE — PATIENT INSTRUCTIONS
If you wish to schedule another appointment, please call our office at [phone]. You can also make appointments through [facility]  Return to clinic:     Every 4 weeks till 28 weeks, then every 2 weeks till 36 weeks, then weekly till delivery    If anything comes up between your visits or you have concerns, please don't hesitate to contact us.                                             If you have labs or imaging done, the results will automatically release in [facility] without an interpretation.  Your health care professional will review those results and send an interpretation with recommendations as soon as possible, but this may be 1-3 business days.    If you have any questions regarding your visit, please contact your care team.     [employer] Access Services: [phone]  [facility] CLINIC HOURS TELEPHONE NUMBER   Chanelle NevarezDOVince    Rosemarie -Surgery Scheduler  Yas -     SAMANTHA Schroeder, SAMANTHA Cage RN   [place]    Monday 8:30 am-5:00 pm  Wednesday 8:30 am-5:00 pm  Friday 8:30 am-5:00 pm    Typical Surgery day: Tuesday [facility]  [address]  [place] [address]  Phone:  [phone]  Fax: [phone]   Appointment [phone]    Imaging Scheduling-All Locations [phone]    [facility] Labor and Delivery  [address] [place] [address]   [place] [address] [phone]   **Surgeries** Our Surgery Schedulers will contact you to schedule. If you do not receive a call within 3 business days, please call [phone].  Urgent Care locations:  [facility] Monday-Friday   10 am - 8 pm  Saturday and    9 am - 5 pm [phone]   If you need a medication refill, please contact your pharmacy. Please allow 3 business days for your refill to be completed.  As always, Thank you for trusting us with your healthcare needs!  see additional instructions from your care team below

## 2025-05-07 ENCOUNTER — PRENATAL OFFICE VISIT (OUTPATIENT)
Dept: OBGYN | Facility: CLINIC | Age: 30
End: 2025-05-07
Payer: COMMERCIAL

## 2025-05-07 VITALS
WEIGHT: 152 LBS | DIASTOLIC BLOOD PRESSURE: 77 MMHG | BODY MASS INDEX: 22.93 KG/M2 | SYSTOLIC BLOOD PRESSURE: 122 MMHG | HEART RATE: 99 BPM | OXYGEN SATURATION: 97 %

## 2025-05-07 DIAGNOSIS — O09.93 SUPERVISION OF HIGH RISK PREGNANCY IN THIRD TRIMESTER: Primary | ICD-10-CM

## 2025-05-07 PROCEDURE — 99207 PR PRENATAL VISIT: CPT | Performed by: OBSTETRICS & GYNECOLOGY

## 2025-05-07 PROCEDURE — 87653 STREP B DNA AMP PROBE: CPT | Performed by: OBSTETRICS & GYNECOLOGY

## 2025-05-07 PROCEDURE — 0502F SUBSEQUENT PRENATAL CARE: CPT | Performed by: OBSTETRICS & GYNECOLOGY

## 2025-05-07 PROCEDURE — 3078F DIAST BP <80 MM HG: CPT | Performed by: OBSTETRICS & GYNECOLOGY

## 2025-05-07 PROCEDURE — 3074F SYST BP LT 130 MM HG: CPT | Performed by: OBSTETRICS & GYNECOLOGY

## 2025-05-07 NOTE — PROGRESS NOTES
She reports feeling reassuring daily fetal activity and will continue to record.  She had no weight change since her last visit and denies any fluid leakage or regular uterine contractions.  She was advised by MFM to deliver at 37 weeks gestation so I called [facility] and scheduled her for cervical ripening since her Wang score is 3 today.  She is scheduled on the earliest date available which is 5/15/2025 and she is a level II.  A GBS culture was collected and submitted to lab and she denies any allergies.  Her cervix remains closed/25%/-3/intact/vertex.  Informed consent was reviewed and obtained for cervical ripening followed by labor induction and all her questions and concerns were addressed.

## 2025-05-08 LAB — GP B STREP DNA SPEC QL NAA+PROBE: NEGATIVE

## 2025-05-09 ENCOUNTER — ANCILLARY PROCEDURE (OUTPATIENT)
Dept: ULTRASOUND IMAGING | Facility: OTHER | Age: 30
End: 2025-05-09
Attending: OBSTETRICS & GYNECOLOGY
Payer: COMMERCIAL

## 2025-05-09 DIAGNOSIS — O09.93 SUPERVISION OF HIGH RISK PREGNANCY IN THIRD TRIMESTER: ICD-10-CM

## 2025-05-09 DIAGNOSIS — O10.919 CHRONIC HYPERTENSION AFFECTING PREGNANCY: ICD-10-CM

## 2025-05-09 PROCEDURE — 76819 FETAL BIOPHYS PROFIL W/O NST: CPT | Mod: TC | Performed by: RADIOLOGY

## 2025-05-10 ENCOUNTER — RESULTS FOLLOW-UP (OUTPATIENT)
Dept: OBGYN | Facility: CLINIC | Age: 30
End: 2025-05-10

## 2025-05-19 ENCOUNTER — TELEPHONE (OUTPATIENT)
Dept: OBGYN | Facility: CLINIC | Age: 30
End: 2025-05-19
Payer: COMMERCIAL

## 2025-05-19 NOTE — TELEPHONE ENCOUNTER
Pt delivered on 5/16 and complaining of a HA.    Pt is currently taking labetalol 100 mg BID.  She does have chronic HTN so has been taking this prior, during her pregnancy and currently.    Pt has been experiencing a HA all day today.  She took Tylenol this morning and this afternoon with no relief.  Last took Tylenol at 3 pm.  She took 1000 mg.      She says it comes and goes but rates it at a 5 or 6 out of 10 currently.    Denies vision changes, chest pain, difficulty breathing, upper abdominal pain.    BP at 138/92 at 3 pm.  Took labetalol 100 mg at 8 am and will take again around 9 or 10 pm.    Pt has been drinking electrolyte drinks as well as water today.    Laying down does give her some relief in her headache.    Routing to Dr. Nevarez to further advise.    I did tell pt to continue to push fluids, take Tylenol as directed and rest.  If headache worsens, and/or she develops any other concerning symptoms or blood pressure increases then she needs to go to the ER if she does not hear back from us tonight.    Elda Galicia, SAMANTHA-[facility]

## 2025-05-20 NOTE — TELEPHONE ENCOUNTER
"Chanelle Nevarez, DO to  Ob/Gyn Triage (Selected Message)      5/19/25  6:45 PM  \"This patient should go to the ED for further evaluation this evening as previously directed by triage.\"    Sent pt a [employer] message.    Elda Galicia RN- [facility]    "

## 2025-05-26 ENCOUNTER — PATIENT OUTREACH (OUTPATIENT)
Dept: CARE COORDINATION | Facility: CLINIC | Age: 30
End: 2025-05-26
Payer: COMMERCIAL

## 2025-06-17 ENCOUNTER — MEDICAL CORRESPONDENCE (OUTPATIENT)
Dept: HEALTH INFORMATION MANAGEMENT | Facility: CLINIC | Age: 30
End: 2025-06-17
Payer: COMMERCIAL

## 2025-06-24 ENCOUNTER — TELEPHONE (OUTPATIENT)
Dept: OBGYN | Facility: CLINIC | Age: 30
End: 2025-06-24
Payer: COMMERCIAL

## 2025-06-24 NOTE — TELEPHONE ENCOUNTER
[facility] Call Center    Phone Message    May a detailed message be left on voicemail: yes     Reason for Call: Pt would like an earlier post partum appt. Pt was told her original appt was not available due to provider schedule. Writer scheduled pt with earliest appt available.     Pt can be reached at [phone]    Action Taken: Other: [facility] MA LPN     Travel Screening: Not Applicable

## 2025-06-25 NOTE — TELEPHONE ENCOUNTER
Unable to reach patient via phone. Left message to return call to clinic.  7/22 is currently the soonest opening at the [place] location.  Patient can be seen sooner if willing to go to other locations. Dr[facility] Fraire [facility] [place] [facility] [place] [facility]    Michelle Mas MA  6/25/2025 12:14 PM

## 2025-06-26 NOTE — TELEPHONE ENCOUNTER
Spoke with pt to R/S her appt to a sooner date. Scheduled her Dr. Nevarez on 7/16/25 at 11:30.  Clarissa Mendoza MA

## 2025-07-14 ENCOUNTER — MEDICAL CORRESPONDENCE (OUTPATIENT)
Dept: HEALTH INFORMATION MANAGEMENT | Facility: CLINIC | Age: 30
End: 2025-07-14
Payer: COMMERCIAL

## 2025-07-14 NOTE — PATIENT INSTRUCTIONS
If you have labs or imaging done, the results will automatically release in itravel without an interpretation.  Your health care professional will review those results and send an interpretation with recommendations as soon as possible, but this may be 1-3 business days.    If you have any questions regarding your visit, please contact your care team.     [employer] [facility]: [phone]  [facility] CLINIC HOURS TELEPHONE NUMBER   Chanelle Nevarez DO.    Omni CMA    [facility] -Surgery Scheduler  Yas -     SAMANTHA Schroeder, SAMANTHA Cage, SAMANTHA   [place]    Monday 8:30 am-5:00 pm  Wednesday 8:30 am-5:00 pm  Friday 8:30 am-5:00 pm    Typical Surgery day: Tuesday [facility]  [address] [place] [address]  Phone:  [phone]  Fax: [phone]   Appointment [phone]    Imaging Scheduling-All Locations   [phone] [facility]  [address] [place] [address] [place] [address] [phone]   **Surgeries** Our Surgery Schedulers will contact you to schedule. If you do not receive a call within 3 business days, please call [phone].  Urgent Care locations:  [facility] Monday-Friday   10 am - 8 pm  Saturday and    9 am - 5 pm [phone]   If you need a medication refill, please contact your pharmacy. Please allow 3 business days for your refill to be completed.  As always, Thank you for trusting us with your healthcare needs!  see additional instructions from your care team below

## 2025-07-16 ENCOUNTER — PRENATAL OFFICE VISIT (OUTPATIENT)
Dept: OBGYN | Facility: CLINIC | Age: 30
End: 2025-07-16
Payer: COMMERCIAL

## 2025-07-16 VITALS
DIASTOLIC BLOOD PRESSURE: 69 MMHG | HEART RATE: 78 BPM | OXYGEN SATURATION: 96 % | SYSTOLIC BLOOD PRESSURE: 120 MMHG | BODY MASS INDEX: 19.46 KG/M2 | WEIGHT: 129 LBS

## 2025-07-16 DIAGNOSIS — Z12.4 SCREENING FOR CERVICAL CANCER: ICD-10-CM

## 2025-07-16 DIAGNOSIS — Z86.32 HISTORY OF GESTATIONAL DIABETES MELLITUS (GDM), NOT CURRENTLY PREGNANT: ICD-10-CM

## 2025-07-16 PROCEDURE — 99207 PR POST PARTUM EXAM: CPT | Performed by: OBSTETRICS & GYNECOLOGY

## 2025-07-16 PROCEDURE — 3074F SYST BP LT 130 MM HG: CPT | Performed by: OBSTETRICS & GYNECOLOGY

## 2025-07-16 PROCEDURE — 3078F DIAST BP <80 MM HG: CPT | Performed by: OBSTETRICS & GYNECOLOGY

## 2025-07-16 PROCEDURE — 87624 HPV HI-RISK TYP POOLED RSLT: CPT | Performed by: OBSTETRICS & GYNECOLOGY

## 2025-07-16 PROCEDURE — 0503F POSTPARTUM CARE VISIT: CPT | Performed by: OBSTETRICS & GYNECOLOGY

## 2025-07-16 ASSESSMENT — PATIENT HEALTH QUESTIONNAIRE - PHQ9: SUM OF ALL RESPONSES TO PHQ QUESTIONS 1-9: 0

## 2025-07-16 NOTE — PROGRESS NOTES
Charisma is here for a postpartum checkup.  She is a 29 y/o female, , LMP 2024, using abstinence during this period but plans on condom use in the future who had a  on 2025 without complication.  She is breastfeeding and denies any issues with her breasts.    She had a   OB History    Para Term  AB Living   2 2 2 0 0 2   SAB IAB Ectopic Multiple Live Births   0 0 0 0 2      # Outcome Date GA Lbr Jarrett/2nd Weight Sex Type Anes PTL Lv   2 Term 25 37w2d 03:55 / 01:25 3.76 kg (8 lb 4.6 oz) M Vag-Spont EPI N CHANTAL      Name: Reymundo Mathew      Apgar1: 8  Apgar5: 8   1 Term 23 38w3d 06:36 / 02:27 3.34 kg (7 lb 5.8 oz) F Vag-Spont EPI N CHANTAL      Complications: Preeclampsia/Hypertension      Name: CECILIOGILES      Apgar1: 9  Apgar5: 9      Obstetric Comments   EDC 2023 based on LMP,   to [place]      Since delivery, she has been breast feeding.  She has not had a normal menses.  She has not had intercourse.  Patient screened for postpartum depression and complaints are NEGATIVE. Screening has also been completed for intimate partner violence. She would like to discuss her contraceptive options while breastfeeding.    PE: /69   Pulse 78   Wt 58.5 kg (129 lb)   LMP 2024   SpO2 96%   Breastfeeding Yes   BMI 19.46 kg/m    Body mass index is 19.46 kg/m .    General Appearance:  healthy, alert, active, no distress  Cardiovascular:  Regular rate and Rhythm without murmur  Lungs:  Clear, without wheeze, rale or rhonchi  Abdomen: Benign, Soft, flat, non-tender, No masses, organomegaly, No inguinal nodes, and Bowel sounds normoactive.   Pelvic:       - Ext: Vulva and perineum are normal without lesion, mass or discharge        - Bladder: no tenderness, no masses       - Vagina: Normal mucosa, no discharge        - Cervix: normal and multiparous       - Uterus:Normal shape, position and consistencyfirm, nontender, nongravid uterus without CMT       -  Adnexa: Normal without masses or tenderness       - Rectal: deferred    A/P  Routine Postpartum Exam, Contraceptive Consult, Hx of Gestational Diabetes, Chronic Hypertension     - I discussed the new pap recommendations regarding screening.  Explained the rationale for increased intervals between paps.  Questions asked and answered.  She does agree to this regiment.   - Pap was performed and submitted to lab since overdue   - Contraception: She plans to use condoms until her  decides whether or not to have a vas.   -  She states that she does not need a refill of Labetalol at this time and continues to take 100 mg BID po   -  She understands that she is due for a 2-hour GTT and written instructions were provided to the patient and this order was placed.  We discussed the need for a yearly fasting glucose test in the future, indefinitely.   -  She is to continue taking the PNV daily po while breastfeeding.    Chanelle Nevarez DO  FACOG, FACS

## 2025-07-17 LAB
HPV HR 12 DNA CVX QL NAA+PROBE: NEGATIVE
HPV16 DNA CVX QL NAA+PROBE: NEGATIVE
HPV18 DNA CVX QL NAA+PROBE: NEGATIVE
HUMAN PAPILLOMA VIRUS FINAL DIAGNOSIS: NORMAL

## 2025-07-21 LAB
BKR AP ASSOCIATED HPV REPORT: NORMAL
BKR LAB AP GYN ADEQUACY: NORMAL
BKR LAB AP GYN INTERPRETATION: NORMAL
BKR LAB AP PREVIOUS ABNORMAL: NORMAL
PATH REPORT.COMMENTS IMP SPEC: NORMAL
PATH REPORT.COMMENTS IMP SPEC: NORMAL
PATH REPORT.RELEVANT HX SPEC: NORMAL

## 2025-07-24 ENCOUNTER — LAB (OUTPATIENT)
Dept: LAB | Facility: OTHER | Age: 30
End: 2025-07-24
Payer: COMMERCIAL

## 2025-07-24 DIAGNOSIS — Z86.32 HISTORY OF GESTATIONAL DIABETES MELLITUS (GDM), NOT CURRENTLY PREGNANT: ICD-10-CM

## 2025-07-24 LAB
NON GESTATIONAL GTT 2 HR POST DOSE: 172 MG/DL (ref 60–199)
NON GESTATIONAL GTT FASTING: 70 MG/DL (ref 60–125)